# Patient Record
Sex: MALE | Race: WHITE | HISPANIC OR LATINO | URBAN - METROPOLITAN AREA
[De-identification: names, ages, dates, MRNs, and addresses within clinical notes are randomized per-mention and may not be internally consistent; named-entity substitution may affect disease eponyms.]

---

## 2017-03-21 DIAGNOSIS — I10 BENIGN ESSENTIAL HTN: ICD-10-CM

## 2017-03-21 RX ORDER — AMLODIPINE BESYLATE 10 MG/1
TABLET ORAL
Qty: 30 TABLET | Refills: 3 | Status: SHIPPED | OUTPATIENT
Start: 2017-03-21 | End: 2017-07-15 | Stop reason: SDUPTHER

## 2017-07-15 DIAGNOSIS — I10 BENIGN ESSENTIAL HTN: ICD-10-CM

## 2017-07-17 RX ORDER — AMLODIPINE BESYLATE 10 MG/1
TABLET ORAL
Qty: 30 TABLET | Refills: 0 | Status: SHIPPED | OUTPATIENT
Start: 2017-07-17 | End: 2017-08-20 | Stop reason: SDUPTHER

## 2017-08-20 DIAGNOSIS — I10 BENIGN ESSENTIAL HTN: ICD-10-CM

## 2017-08-21 RX ORDER — AMLODIPINE BESYLATE 10 MG/1
TABLET ORAL
Qty: 30 TABLET | Refills: 0 | Status: SHIPPED | OUTPATIENT
Start: 2017-08-21 | End: 2017-09-17 | Stop reason: SDUPTHER

## 2017-09-17 DIAGNOSIS — I10 BENIGN ESSENTIAL HTN: ICD-10-CM

## 2017-09-18 DIAGNOSIS — I10 BENIGN ESSENTIAL HTN: ICD-10-CM

## 2017-09-18 RX ORDER — AMLODIPINE BESYLATE 10 MG/1
TABLET ORAL
Qty: 30 TABLET | Refills: 0 | OUTPATIENT
Start: 2017-09-18

## 2017-09-18 RX ORDER — AMLODIPINE BESYLATE 10 MG/1
TABLET ORAL
Qty: 30 TABLET | Refills: 0 | Status: SHIPPED | OUTPATIENT
Start: 2017-09-18 | End: 2017-09-27 | Stop reason: SDUPTHER

## 2017-09-19 DIAGNOSIS — I10 BENIGN ESSENTIAL HTN: ICD-10-CM

## 2017-09-19 RX ORDER — ATENOLOL 50 MG/1
TABLET ORAL
Qty: 60 TABLET | Refills: 0 | OUTPATIENT
Start: 2017-09-19

## 2017-09-27 ENCOUNTER — OFFICE VISIT (OUTPATIENT)
Dept: INTERNAL MEDICINE | Facility: CLINIC | Age: 67
End: 2017-09-27

## 2017-09-27 VITALS
WEIGHT: 133 LBS | HEIGHT: 68 IN | DIASTOLIC BLOOD PRESSURE: 54 MMHG | BODY MASS INDEX: 20.16 KG/M2 | SYSTOLIC BLOOD PRESSURE: 108 MMHG

## 2017-09-27 DIAGNOSIS — E78.00 HYPERCHOLESTEROLEMIA: ICD-10-CM

## 2017-09-27 DIAGNOSIS — I10 ESSENTIAL HYPERTENSION: Primary | ICD-10-CM

## 2017-09-27 DIAGNOSIS — R97.20 ELEVATED PROSTATE SPECIFIC ANTIGEN (PSA): ICD-10-CM

## 2017-09-27 LAB
ALBUMIN SERPL-MCNC: 4.5 G/DL (ref 3.5–5.2)
ALBUMIN/GLOB SERPL: 1.4 G/DL
ALP SERPL-CCNC: 67 U/L (ref 39–117)
ALT SERPL W P-5'-P-CCNC: 14 U/L (ref 1–41)
ANION GAP SERPL CALCULATED.3IONS-SCNC: 11.9 MMOL/L
AST SERPL-CCNC: 16 U/L (ref 1–40)
BACTERIA UR QL AUTO: ABNORMAL /HPF
BASOPHILS # BLD AUTO: 0.02 10*3/MM3 (ref 0–0.2)
BASOPHILS NFR BLD AUTO: 0.2 % (ref 0–2)
BILIRUB SERPL-MCNC: 0.4 MG/DL (ref 0.1–1.2)
BILIRUB UR QL STRIP: NEGATIVE
BUN BLD-MCNC: 27 MG/DL (ref 8–23)
BUN/CREAT SERPL: 18.8 (ref 7–25)
CALCIUM SPEC-SCNC: 10.2 MG/DL (ref 8.6–10.5)
CHLORIDE SERPL-SCNC: 108 MMOL/L (ref 98–107)
CLARITY UR: CLEAR
CO2 SERPL-SCNC: 28.1 MMOL/L (ref 22–29)
COLOR UR: YELLOW
CREAT BLD-MCNC: 1.44 MG/DL (ref 0.76–1.27)
DEPRECATED RDW RBC AUTO: 44.8 FL (ref 37–54)
EOSINOPHIL # BLD AUTO: 0.11 10*3/MM3 (ref 0–0.7)
EOSINOPHIL NFR BLD AUTO: 1.2 % (ref 0–5)
ERYTHROCYTE [DISTWIDTH] IN BLOOD BY AUTOMATED COUNT: 13.4 % (ref 11.5–15)
GFR SERPL CREATININE-BSD FRML MDRD: 49 ML/MIN/1.73
GLOBULIN UR ELPH-MCNC: 3.3 GM/DL
GLUCOSE BLD-MCNC: 97 MG/DL (ref 65–99)
GLUCOSE UR STRIP-MCNC: NEGATIVE MG/DL
HCT VFR BLD AUTO: 41.1 % (ref 40.1–51)
HGB BLD-MCNC: 13.7 G/DL (ref 13.7–17.5)
HGB UR QL STRIP.AUTO: ABNORMAL
HYALINE CASTS UR QL AUTO: ABNORMAL /LPF
KETONES UR QL STRIP: NEGATIVE
LEUKOCYTE ESTERASE UR QL STRIP.AUTO: NEGATIVE
LYMPHOCYTES # BLD AUTO: 0.82 10*3/MM3 (ref 0.8–7)
LYMPHOCYTES NFR BLD AUTO: 8.7 % (ref 10–60)
MCH RBC QN AUTO: 31.1 PG (ref 26–34)
MCHC RBC AUTO-ENTMCNC: 33.3 G/DL (ref 31–37)
MCV RBC AUTO: 93.2 FL (ref 80–100)
MONOCYTES # BLD AUTO: 0.52 10*3/MM3 (ref 0–1)
MONOCYTES NFR BLD AUTO: 5.5 % (ref 0–13)
NEUTROPHILS # BLD AUTO: 7.91 10*3/MM3 (ref 1–11)
NEUTROPHILS NFR BLD AUTO: 84.4 % (ref 30–85)
NITRITE UR QL STRIP: NEGATIVE
PH UR STRIP.AUTO: 6.5 [PH] (ref 5–8)
PLATELET # BLD AUTO: 250 10*3/MM3 (ref 150–450)
PMV BLD AUTO: 10.7 FL (ref 6–12)
POTASSIUM BLD-SCNC: 5.3 MMOL/L (ref 3.5–5.2)
PROT SERPL-MCNC: 7.8 G/DL (ref 6–8.5)
PROT UR QL STRIP: NEGATIVE
PSA SERPL-MCNC: 3.84 NG/ML (ref 0–4)
RBC # BLD AUTO: 4.41 10*6/MM3 (ref 4.63–6.08)
RBC # UR: ABNORMAL /HPF
REF LAB TEST METHOD: ABNORMAL
SODIUM BLD-SCNC: 148 MMOL/L (ref 136–145)
SP GR UR STRIP: 1.01 (ref 1–1.03)
SQUAMOUS #/AREA URNS HPF: ABNORMAL /HPF
UROBILINOGEN UR QL STRIP: ABNORMAL
WBC NRBC COR # BLD: 9.38 10*3/MM3 (ref 5–10)
WBC UR QL AUTO: ABNORMAL /HPF

## 2017-09-27 PROCEDURE — 81001 URINALYSIS AUTO W/SCOPE: CPT | Performed by: INTERNAL MEDICINE

## 2017-09-27 PROCEDURE — 84153 ASSAY OF PSA TOTAL: CPT | Performed by: INTERNAL MEDICINE

## 2017-09-27 PROCEDURE — 85025 COMPLETE CBC W/AUTO DIFF WBC: CPT | Performed by: INTERNAL MEDICINE

## 2017-09-27 PROCEDURE — 80053 COMPREHEN METABOLIC PANEL: CPT | Performed by: INTERNAL MEDICINE

## 2017-09-27 PROCEDURE — 99215 OFFICE O/P EST HI 40 MIN: CPT | Performed by: INTERNAL MEDICINE

## 2017-09-28 ENCOUNTER — TELEPHONE (OUTPATIENT)
Dept: INTERNAL MEDICINE | Facility: CLINIC | Age: 67
End: 2017-09-28

## 2017-09-28 DIAGNOSIS — N28.9 DECREASED RENAL FUNCTION: Primary | ICD-10-CM

## 2017-09-28 NOTE — TELEPHONE ENCOUNTER
----- Message from Alessandro Guadalupe Jr., MD sent at 9/28/2017  8:02 AM EDT -----  PSA has returned to normal.  Renal function somewhat diminished although this may be lab area.  Please repeat BMP after having drunk 8 glasses of liquids the day before.

## 2017-10-22 DIAGNOSIS — I10 BENIGN ESSENTIAL HTN: ICD-10-CM

## 2017-10-23 RX ORDER — AMLODIPINE BESYLATE 10 MG/1
TABLET ORAL
Qty: 30 TABLET | Refills: 5 | Status: SHIPPED | OUTPATIENT
Start: 2017-10-23 | End: 2018-03-28 | Stop reason: SDUPTHER

## 2017-12-04 RX ORDER — METOPROLOL TARTRATE 50 MG/1
TABLET, FILM COATED ORAL
Qty: 60 TABLET | Refills: 3 | Status: SHIPPED | OUTPATIENT
Start: 2017-12-04 | End: 2018-03-28 | Stop reason: SDUPTHER

## 2018-03-28 ENCOUNTER — OFFICE VISIT (OUTPATIENT)
Dept: INTERNAL MEDICINE | Facility: CLINIC | Age: 68
End: 2018-03-28

## 2018-03-28 VITALS
HEART RATE: 58 BPM | DIASTOLIC BLOOD PRESSURE: 74 MMHG | WEIGHT: 140 LBS | HEIGHT: 68 IN | SYSTOLIC BLOOD PRESSURE: 110 MMHG | OXYGEN SATURATION: 98 % | BODY MASS INDEX: 21.22 KG/M2

## 2018-03-28 DIAGNOSIS — I10 ESSENTIAL HYPERTENSION: Primary | ICD-10-CM

## 2018-03-28 DIAGNOSIS — R97.20 ELEVATED PROSTATE SPECIFIC ANTIGEN (PSA): ICD-10-CM

## 2018-03-28 DIAGNOSIS — E78.00 HYPERCHOLESTEROLEMIA: ICD-10-CM

## 2018-03-28 LAB
ALBUMIN SERPL-MCNC: 4.1 G/DL (ref 3.5–5.2)
ALBUMIN/GLOB SERPL: 1.6 G/DL
ALP SERPL-CCNC: 68 U/L (ref 39–117)
ALT SERPL-CCNC: 10 U/L (ref 1–41)
AST SERPL-CCNC: 16 U/L (ref 1–40)
BILIRUB SERPL-MCNC: 0.3 MG/DL (ref 0.1–1.2)
BUN SERPL-MCNC: 26 MG/DL (ref 8–23)
BUN/CREAT SERPL: 21.5 (ref 7–25)
CALCIUM SERPL-MCNC: 9.5 MG/DL (ref 8.6–10.5)
CHLORIDE SERPL-SCNC: 104 MMOL/L (ref 98–107)
CO2 SERPL-SCNC: 27.1 MMOL/L (ref 22–29)
CREAT SERPL-MCNC: 1.21 MG/DL (ref 0.76–1.27)
GLOBULIN SER CALC-MCNC: 2.6 GM/DL
GLUCOSE SERPL-MCNC: 86 MG/DL (ref 65–99)
POTASSIUM SERPL-SCNC: 5 MMOL/L (ref 3.5–5.2)
PROT SERPL-MCNC: 6.7 G/DL (ref 6–8.5)
PSA SERPL-MCNC: 3.12 NG/ML (ref 0–4)
SODIUM SERPL-SCNC: 144 MMOL/L (ref 136–145)

## 2018-03-28 PROCEDURE — 36415 COLL VENOUS BLD VENIPUNCTURE: CPT | Performed by: INTERNAL MEDICINE

## 2018-03-28 PROCEDURE — 99213 OFFICE O/P EST LOW 20 MIN: CPT | Performed by: INTERNAL MEDICINE

## 2018-03-28 RX ORDER — AMLODIPINE BESYLATE 10 MG/1
10 TABLET ORAL DAILY
Qty: 90 TABLET | Refills: 3 | Status: SHIPPED | OUTPATIENT
Start: 2018-03-28 | End: 2019-03-12 | Stop reason: SDUPTHER

## 2018-03-28 RX ORDER — METOPROLOL TARTRATE 50 MG/1
50 TABLET, FILM COATED ORAL 2 TIMES DAILY
Qty: 180 TABLET | Refills: 3 | Status: SHIPPED | OUTPATIENT
Start: 2018-03-28 | End: 2019-03-12 | Stop reason: SDUPTHER

## 2018-03-28 NOTE — PROGRESS NOTES
Subjective   Jose Angel Us is a 67 y.o. male.     History of Present Illness he is here today for follow-up of hypertension, hypercholesterolemia, and BPH with elevated PSA.  In general he has felt well.  He denies any dysuria, hematuria, incomplete voiding, or urinary hesitancy.  He does not have any nocturia.  He denies any PND, MCCURDY, chest pain, or swelling in the ankles.  He has not had any dizziness, syncope, or focal neurologic symptoms.        Review of Systems   Constitutional: Negative for activity change, appetite change and fatigue.   HENT: Negative for trouble swallowing.    Respiratory: Negative for cough, chest tightness, shortness of breath and wheezing.    Cardiovascular: Negative for chest pain, palpitations and leg swelling.   Gastrointestinal: Negative for abdominal pain.   Genitourinary: Negative for difficulty urinating, flank pain, frequency, hematuria and nocturia.   Neurological: Negative for dizziness, speech difficulty and numbness.   Psychiatric/Behavioral: Negative for depressed mood. The patient is not nervous/anxious.        Objective   Physical Exam   Constitutional: He is oriented to person, place, and time. Vital signs are normal. He appears well-developed and well-nourished. He is active. He does not appear ill.   Eyes: Conjunctivae are normal.   Neck: Carotid bruit is not present.   Cardiovascular: Normal rate, regular rhythm, S1 normal and S2 normal.  Exam reveals no S3 and no S4.    No murmur heard.  Pulses:       Posterior tibial pulses are 2+ on the right side, and 2+ on the left side.   Pulmonary/Chest: No tachypnea. No respiratory distress. He has no decreased breath sounds. He has no wheezes. He has no rhonchi. He has no rales.   Abdominal: Soft. Normal appearance and bowel sounds are normal. He exhibits no abdominal bruit and no mass. There is no hepatosplenomegaly. There is no tenderness.     Vascular Status -  His right foot exhibits no edema. His left foot exhibits no  edema.  Neurological: He is alert and oriented to person, place, and time. Gait normal.   Psychiatric: He has a normal mood and affect. His speech is normal and behavior is normal. Judgment and thought content normal. Cognition and memory are normal.         Assessment/Plan September lab showed a normal CBC.  PSA was 3.8.  Urinalysis showed 6-12 red cells.  BUN 27 creatinine 1.44    Assessment #1 hypertension-good control #2 hypercholesterolemia-no sign of target organ injury #3 BPH-likely cause of mildly transiently elevated PSA #4 question renal insufficiency.  All of this was discussed with the patient.    Plan #1 no change medication #2 CMP and PSA today.  Routine follow-up with me in one year.

## 2018-05-09 ENCOUNTER — OFFICE VISIT (OUTPATIENT)
Dept: INTERNAL MEDICINE | Facility: CLINIC | Age: 68
End: 2018-05-09

## 2018-05-09 VITALS
WEIGHT: 135 LBS | SYSTOLIC BLOOD PRESSURE: 126 MMHG | HEART RATE: 56 BPM | HEIGHT: 68 IN | BODY MASS INDEX: 20.46 KG/M2 | DIASTOLIC BLOOD PRESSURE: 70 MMHG | OXYGEN SATURATION: 96 %

## 2018-05-09 DIAGNOSIS — N40.1 BENIGN PROSTATIC HYPERPLASIA WITH NOCTURIA: ICD-10-CM

## 2018-05-09 DIAGNOSIS — N39.0 URINARY TRACT INFECTION WITHOUT HEMATURIA, SITE UNSPECIFIED: Primary | ICD-10-CM

## 2018-05-09 DIAGNOSIS — R35.1 BENIGN PROSTATIC HYPERPLASIA WITH NOCTURIA: ICD-10-CM

## 2018-05-09 LAB
BACTERIA UR QL AUTO: ABNORMAL /HPF
BILIRUB UR QL STRIP: NEGATIVE
BUN SERPL-MCNC: 24 MG/DL (ref 8–23)
BUN/CREAT SERPL: 18.5 (ref 7–25)
CALCIUM SERPL-MCNC: 9.7 MG/DL (ref 8.6–10.5)
CHLORIDE SERPL-SCNC: 106 MMOL/L (ref 98–107)
CLARITY UR: CLEAR
CO2 SERPL-SCNC: 25.3 MMOL/L (ref 22–29)
COLOR UR: YELLOW
CREAT SERPL-MCNC: 1.3 MG/DL (ref 0.76–1.27)
GLUCOSE SERPL-MCNC: 102 MG/DL (ref 65–99)
GLUCOSE UR STRIP-MCNC: NEGATIVE MG/DL
HGB UR QL STRIP.AUTO: ABNORMAL
HYALINE CASTS UR QL AUTO: ABNORMAL /LPF
KETONES UR QL STRIP: NEGATIVE
LEUKOCYTE ESTERASE UR QL STRIP.AUTO: NEGATIVE
MUCOUS THREADS URNS QL MICRO: ABNORMAL /HPF
NITRITE UR QL STRIP: NEGATIVE
PH UR STRIP.AUTO: 7.5 [PH] (ref 5–8)
POTASSIUM SERPL-SCNC: 4.9 MMOL/L (ref 3.5–5.2)
PROT UR QL STRIP: NEGATIVE
RBC # UR: ABNORMAL /HPF
REF LAB TEST METHOD: ABNORMAL
SODIUM SERPL-SCNC: 143 MMOL/L (ref 136–145)
SP GR UR STRIP: 1.02 (ref 1–1.03)
SQUAMOUS #/AREA URNS HPF: ABNORMAL /HPF
UROBILINOGEN UR QL STRIP: ABNORMAL
WBC UR QL AUTO: ABNORMAL /HPF

## 2018-05-09 PROCEDURE — 99213 OFFICE O/P EST LOW 20 MIN: CPT | Performed by: INTERNAL MEDICINE

## 2018-05-09 PROCEDURE — 36415 COLL VENOUS BLD VENIPUNCTURE: CPT | Performed by: INTERNAL MEDICINE

## 2018-05-09 PROCEDURE — 81001 URINALYSIS AUTO W/SCOPE: CPT | Performed by: INTERNAL MEDICINE

## 2018-05-09 RX ORDER — CEPHALEXIN 250 MG/1
CAPSULE ORAL
Qty: 56 CAPSULE | Refills: 0 | Status: SHIPPED | OUTPATIENT
Start: 2018-05-09 | End: 2019-03-12

## 2018-05-09 RX ORDER — CIPROFLOXACIN 500 MG/1
TABLET, FILM COATED ORAL
Qty: 28 TABLET | Refills: 0 | Status: SHIPPED | OUTPATIENT
Start: 2018-05-09 | End: 2018-05-09

## 2018-05-09 NOTE — PROGRESS NOTES
Subjective   Jose Angel Us is a 67 y.o. male.     History of Present Illness he is here today with a two-week history of suprapubic inguinal area pressure primarily at night when he lies down in bed as well as 2 per night nocturia.  He is concerned that he has prostate cancer.  He denies any dysuria, hematuria, urinary hesitancy, sensation of incomplete voiding, or urgency.        Review of Systems   Constitutional: Negative for activity change, appetite change, chills, diaphoresis, fatigue, fever and unexpected weight loss.   Gastrointestinal: Negative for abdominal pain, constipation, diarrhea, nausea and vomiting.   Genitourinary: Positive for nocturia. Negative for dysuria, flank pain, frequency, hematuria and urgency.   Musculoskeletal: Negative for back pain.       Objective   Physical Exam   Constitutional: He is oriented to person, place, and time. Vital signs are normal. He appears well-developed and well-nourished. He is active. He does not appear ill.   Cardiovascular: Normal rate, regular rhythm, S1 normal and S2 normal.  Exam reveals no S3 and no S4.    No murmur heard.  Pulmonary/Chest: No tachypnea. No respiratory distress. He has no decreased breath sounds. He has no wheezes. He has no rhonchi. He has no rales.   Abdominal: Soft. Bowel sounds are normal. He exhibits no abdominal bruit and no mass. There is no hepatosplenomegaly. There is no tenderness. Hernia confirmed negative in the right inguinal area and confirmed negative in the left inguinal area.   Genitourinary: Testes normal and penis normal.       Circumcised.     Vascular Status -  His right foot exhibits no edema. His left foot exhibits no edema.  Lymphadenopathy: No inguinal adenopathy noted on the right or left side.   Neurological: He is alert and oriented to person, place, and time. Gait normal.   Psychiatric: He has a normal mood and affect. His speech is normal and behavior is normal. Judgment and thought content normal. Cognition and  memory are normal.         Assessment/Plan urinalysis shows 3-5 red cells.  PSA is normal.  In March BUN 26 creatinine 1.21    Assessment #1 BPH-question low-grade prostatitis.  This was discussed with him.    Plan #1 Cipro 500 mg twice a day for 14 days #2 BMP today.  If symptoms do not clear after 2 weeks of Cipro he will be referred to his urologist.

## 2018-05-14 ENCOUNTER — TELEPHONE (OUTPATIENT)
Dept: INTERNAL MEDICINE | Facility: CLINIC | Age: 68
End: 2018-05-14

## 2018-05-14 DIAGNOSIS — N28.9 IMPAIRED RENAL FUNCTION: Primary | ICD-10-CM

## 2018-05-14 NOTE — TELEPHONE ENCOUNTER
----- Message from Alessandro Guadalupe Jr., MD sent at 5/10/2018  7:50 AM EDT -----  Renal function is mildly diminished.  We need renal ultrasound and consult with urology to rule out obstructive uropathy.

## 2018-05-15 ENCOUNTER — TELEPHONE (OUTPATIENT)
Dept: INTERNAL MEDICINE | Facility: CLINIC | Age: 68
End: 2018-05-15

## 2018-05-15 NOTE — TELEPHONE ENCOUNTER
----- Message from Gabrielle Clement sent at 5/15/2018 11:19 AM EDT -----  Pt picked up the cephalexin (KEFLEX) 250 MG capsule medication this morning and took the first pill at 10am. He is now experiencing sinus pressure that he thinks is related to the med. He is asking for a callback at [phone]. Thank you.

## 2019-03-12 ENCOUNTER — OFFICE VISIT (OUTPATIENT)
Dept: INTERNAL MEDICINE | Age: 69
End: 2019-03-12

## 2019-03-12 VITALS
TEMPERATURE: 99 F | BODY MASS INDEX: 21.07 KG/M2 | RESPIRATION RATE: 16 BRPM | OXYGEN SATURATION: 96 % | HEIGHT: 68 IN | WEIGHT: 139 LBS | HEART RATE: 93 BPM | SYSTOLIC BLOOD PRESSURE: 120 MMHG | DIASTOLIC BLOOD PRESSURE: 62 MMHG

## 2019-03-12 DIAGNOSIS — Z76.89 ENCOUNTER TO ESTABLISH CARE: Primary | ICD-10-CM

## 2019-03-12 DIAGNOSIS — I10 ESSENTIAL HYPERTENSION: ICD-10-CM

## 2019-03-12 PROCEDURE — 99204 OFFICE O/P NEW MOD 45 MIN: CPT | Performed by: NURSE PRACTITIONER

## 2019-03-12 RX ORDER — AMLODIPINE BESYLATE 10 MG/1
10 TABLET ORAL DAILY
Qty: 90 TABLET | Refills: 3 | Status: SHIPPED | OUTPATIENT
Start: 2019-03-12 | End: 2019-12-18 | Stop reason: SDUPTHER

## 2019-03-12 RX ORDER — METOPROLOL TARTRATE 50 MG/1
50 TABLET, FILM COATED ORAL 2 TIMES DAILY
Qty: 180 TABLET | Refills: 3 | Status: SHIPPED | OUTPATIENT
Start: 2019-03-12 | End: 2019-12-18 | Stop reason: SDUPTHER

## 2019-03-12 NOTE — PROGRESS NOTES
Jose Angel Magen / 68 y.o. / male  Encounter Date: 03/12/2019    ASSESSMENT & PLAN:    Problem List Items Addressed This Visit        Cardiovascular and Mediastinum    Hypertension    Relevant Medications    amLODIPine (NORVASC) 10 MG tablet    metoprolol tartrate (LOPRESSOR) 50 MG tablet      Other Visit Diagnoses     Encounter to establish care    -  Primary        No orders of the defined types were placed in this encounter.    New Medications Ordered This Visit   Medications   • amLODIPine (NORVASC) 10 MG tablet     Sig: Take 1 tablet by mouth Daily.     Dispense:  90 tablet     Refill:  3   • metoprolol tartrate (LOPRESSOR) 50 MG tablet     Sig: Take 1 tablet by mouth 2 (Two) Times a Day.     Dispense:  180 tablet     Refill:  3       Summary/Discussion:  1.  Patient is due for Medicare annual wellness visit.  Given packet to complete and return before next visit. Pt verbalized understanding.   2.  He is not fasting today. Due for annual physical with fasting labs, CBC, CMP, PSA, lipid panel, A1c.  History of elevated PSA that trended down. Denies urinary symptoms today.  3.  Pt clothing self-care/appearance and wandering conversation tendency require follow-up. Compare to presentation at follow up visit since this is my first time meeting him. Consider dementia screening at next visit.  Family history of mother with dementia. Assess depression screening also.   4. Refused colonoscopy and all vaccinations (Tdap, Zoster, Pneumococcal, flu). Educated pt about risk of colon cancer, option for Cologuard, and risk of illness without vaccinations.   5. Recommended stopping ASA based on recent ASA trials for primary prevention (ASPREE, ASCEND, ARRIVE).  6. HTN stable, continue to monitor. Maintain low sodium diet, increase aerobic exercise. Pt would like to stop HTN medication if future if possible.  Advised patient of risk for sustained elevated blood pressure, genetic predisposition, and lifestyle modifications to  "consider decreasing medication.    Return in about 3 months (around 6/12/2019) for Medicare Wellness with fasting labs 1 week prior.  ________________________________________________________________    VITALS:    Visit Vitals  /62   Pulse 93   Temp 99 °F (37.2 °C)   Resp 16   Ht 172.7 cm (68\")   Wt 63 kg (139 lb)   SpO2 96%   BMI 21.13 kg/m²       BP Readings from Last 3 Encounters:   03/12/19 120/62   05/09/18 126/70   03/28/18 110/74     Wt Readings from Last 3 Encounters:   03/12/19 63 kg (139 lb)   05/09/18 61.2 kg (135 lb)   03/28/18 63.5 kg (140 lb)      Body mass index is 21.13 kg/m².    CC: Main reason(s) for today's visit: Establish Care (new patient- needs refills ) and Sinus Problem (pt also complaining of sinus congestion )    Patient is a 68 y.o. male who is here to establish care.   Reports he has had chronic sinus congestion, pointing to his forehead, for greater than 30 years.  Denies coughing, fevers, chills, ear pain, headaches, SOA, chest pain.  Patient has difficulty maintaining topic of conversation. Family history significant for mother with dementia. He has duct tape maintaining his eye-glasses and belt, as well as multiple tatters on his jacket sleeves. He is physically clean. Reports he does not go to the dentist or the eye doctor because of cost although he needs new prescription glasses and his current glasses are visibly dirty/cracked. Denies vision changes/difficulty.     Patient Care Team:  Amber Guerra APRN as PCP - General (Nurse Practitioner)  ____________________________________________________________________    REVIEW OF SYSTEMS    Review of Systems   Constitutional: Negative.    HENT: Positive for congestion (reports sinus pressure >30 years). Negative for ear pain.    Respiratory: Negative for cough and shortness of breath.    Cardiovascular: Negative.    Gastrointestinal: Negative.    Genitourinary: Positive for frequency (minimal). Negative for difficulty urinating, " enuresis and urgency.   Musculoskeletal: Negative.    Allergic/Immunologic: Positive for environmental allergies.   Neurological: Negative for light-headedness and headaches.   Psychiatric/Behavioral: Positive for decreased concentration.     PHYSICAL EXAMINATION    Physical Exam   Constitutional: He is oriented to person, place, and time. He appears well-developed.   HENT:   Head: Normocephalic.   Right Ear: External ear normal.   Left Ear: External ear normal.   Mouth/Throat: Oropharynx is clear and moist.   Eyes: Pupils are equal, round, and reactive to light.   Neck: Normal range of motion. Neck supple.   Cardiovascular: Normal rate, regular rhythm and normal heart sounds.   Pulmonary/Chest: Effort normal and breath sounds normal.   Musculoskeletal: Normal range of motion.   Lymphadenopathy:     He has no cervical adenopathy.   Neurological: He is alert and oriented to person, place, and time.   Psychiatric: He has a normal mood and affect.   Wandering conversation topics, duct tape supporting belt and glasses, jacket with large tatters on both sleeves     REVIEWED DATA:    Labs:   Lab Results   Component Value Date     05/09/2018    K 4.9 05/09/2018    AST 16 03/28/2018    ALT 10 03/28/2018    BUN 24 (H) 05/09/2018    CREATININE 1.30 (H) 05/09/2018    CREATININE 1.21 03/28/2018    CREATININE 1.44 (H) 09/27/2017    EGFRIFNONA 55 (L) 05/09/2018    EGFRIFAFRI 66 05/09/2018       Lab Results   Component Value Date    GLUCOSE 97 09/27/2017    GLUCOSE 88 10/11/2016       No results found for: LDL, HDL, TRIG, CHOLHDLRATIO    Lab Results   Component Value Date    TSH 1.70 08/28/2015          Lab Results   Component Value Date    WBC 9.38 09/27/2017    HGB 13.7 09/27/2017    HGB 13.7 10/11/2016    HGB 13.9 08/28/2015     09/27/2017         Imaging:      Medical Tests:      Summary of old records / correspondence / consultant report:      Request outside records:    ______________________________________________________________________    ALLERGIES  Allergies   Allergen Reactions   • Ciprofloxacin Unknown (See Comments)     Patient is unsure what reaction he had, severe sinus pressure   • Grass    • Pollen Extract    • Sulfa Antibiotics Rash        MEDICATIONS  Current Outpatient Medications on File Prior to Visit   Medication Sig   • [DISCONTINUED] amLODIPine (NORVASC) 10 MG tablet Take 1 tablet by mouth Daily.   • [DISCONTINUED] aspirin 81 MG tablet Take 81 mg by mouth Daily.   • [DISCONTINUED] metoprolol tartrate (LOPRESSOR) 50 MG tablet Take 1 tablet by mouth 2 (Two) Times a Day.   • [DISCONTINUED] cephalexin (KEFLEX) 250 MG capsule Take one tablet by mouth four times a day for 14 days.     No current facility-administered medications on file prior to visit.        PFSH:     The following portions of the patient's history were reviewed and updated as appropriate: Allergies / Current Medications / Past Medical History / Surgical History / Social History / Family History    PROBLEM LIST   Patient Active Problem List   Diagnosis   • Hypertension   • Benign prostatic hyperplasia   • Chronic sinusitis   • Hypercholesterolemia   • Raynaud's syndrome without gangrene       PAST MEDICAL HISTORY  Past Medical History:   Diagnosis Date   • Anemia    • Hypertension        SURGICAL HISTORY  History reviewed. No pertinent surgical history.    SOCIAL HISTORY  Social History     Socioeconomic History   • Marital status: Unknown     Spouse name: Not on file   • Number of children: Not on file   • Years of education: Not on file   • Highest education level: Not on file   Tobacco Use   • Smoking status: Never Smoker   • Smokeless tobacco: Never Used   Substance and Sexual Activity   • Alcohol use: No     Comment: quit a couple years ago   • Drug use: No   • Sexual activity: No       FAMILY HISTORY  Family History   Problem Relation Age of Onset   • Dementia Mother    • Angina Father    •  Heart disease Father    • Heart attack Father    • Diabetes Maternal Uncle    • Prostate cancer Neg Hx    • Colon cancer Neg Hx

## 2019-12-17 NOTE — PROGRESS NOTES
Jose Angel Barrera / 69 y.o. / male  Encounter Date: 12/18/2019    ASSESSMENT & PLAN:    Problem List Items Addressed This Visit        Cardiovascular and Mediastinum    Hypertension    Relevant Medications    amLODIPine (NORVASC) 10 MG tablet    metoprolol tartrate (LOPRESSOR) 50 MG tablet      Other Visit Diagnoses     Positive occult stool blood test    -  Primary    Relevant Orders    CBC & Differential (Completed)    Ambulatory Referral For Screening Colonoscopy    Constipation, unspecified constipation type        Relevant Orders    Ambulatory Referral For Screening Colonoscopy        Orders Placed This Encounter   Procedures   • Ambulatory Referral For Screening Colonoscopy   • CBC & Differential     New Medications Ordered This Visit   Medications   • amLODIPine (NORVASC) 10 MG tablet     Sig: Take 1 tablet by mouth Daily.     Dispense:  90 tablet     Refill:  3   • metoprolol tartrate (LOPRESSOR) 50 MG tablet     Sig: Take 1 tablet by mouth 2 (Two) Times a Day.     Dispense:  180 tablet     Refill:  3       Summary/Discussion:  1. Essential hypertension  - Continue BP medications as prescribed and follow up in 3 months for recheck.   - amLODIPine (NORVASC) 10 MG tablet; Take 1 tablet by mouth Daily.  Dispense: 90 tablet; Refill: 3  - metoprolol tartrate (LOPRESSOR) 50 MG tablet; Take 1 tablet by mouth 2 (Two) Times a Day.  Dispense: 180 tablet; Refill: 3    2. Positive occult stool blood test  - Avoid all NSAIDS. Advised patient to monitor stool for abnormalities in consistency or bleeding. Recommend screening colonoscopy. Patient has never had colonoscopy. No family history of colon cancer or complications.   - CBC & Differential  - Ambulatory Referral For Screening Colonoscopy    3. Constipation, unspecified constipation type  - Ambulatory Referral For Screening Colonoscopy        Return in about 3 months (around 3/18/2020) for Check Up on chronic  "issues.  ________________________________________________________________    VITALS:    Visit Vitals  /70   Pulse 62   Temp 99.1 °F (37.3 °C) (Temporal)   Ht 172.7 cm (67.99\")   Wt 60.8 kg (134 lb)   SpO2 98%   BMI 20.38 kg/m²       BP Readings from Last 3 Encounters:   12/18/19 130/70   03/12/19 120/62   05/09/18 126/70     Wt Readings from Last 3 Encounters:   12/18/19 60.8 kg (134 lb)   03/12/19 63 kg (139 lb)   05/09/18 61.2 kg (135 lb)      Body mass index is 20.38 kg/m².    CC: Main reason(s) for today's visit: Black or Bloody Stool      HPI    Patient is a 69 y.o. male who is here for follow up on positive fecal occult blood sample completed from mail-in lab through his insurance carrier. He denies active or gross blood in stool, or hematuria, abdominal pain or change in bowel habits. He reports that he frequently uses laxatives and enemas, stating that he is \"addicted to them\", but when questioned he denies constipation issues as cause for use. He was unable to verbalize reason for using enemas. He appears well, but anxious. His VS are stable. No abdominal pain or tenderness on exam. No abnormal bruising, weight loss, fever/chills, night sweats. NO family history of colon cancer or colon disorders. Never smoker, denies alcohol use.     Patient Care Team:  Amber Guerra APRN as PCP - General (Nurse Practitioner)  ____________________________________________________________________    REVIEW OF SYSTEMS    Review of Systems  As noted per HPI  Constitutional neg  Resp neg  CV neg     PHYSICAL EXAMINATION    Physical Exam  Constitutional  No distress  Cardiovascular Rate  normal . Rhythm: regular . Heart sounds:  normal  Pulmonary/Chest  Effort normal. Breath sounds:  normal  Psychiatric  Alert. Judgment and thought content normal. Mood normal      REVIEWED DATA:    Labs:   Lab Results   Component Value Date     05/09/2018    K 4.9 05/09/2018    AST 16 03/28/2018    ALT 10 03/28/2018    BUN 24 (H) " 05/09/2018    CREATININE 1.30 (H) 05/09/2018    CREATININE 1.21 03/28/2018    CREATININE 1.44 (H) 09/27/2017    EGFRIFNONA 55 (L) 05/09/2018    EGFRIFAFRI 66 05/09/2018       Lab Results   Component Value Date    GLUCOSE 97 09/27/2017    GLUCOSE 88 10/11/2016       No results found for: LDL, HDL, TRIG, CHOLHDLRATIO    Lab Results   Component Value Date    TSH 1.70 08/28/2015          Lab Results   Component Value Date    WBC 11.01 (H) 12/18/2019    HGB 14.1 12/18/2019    HGB 13.7 09/27/2017    HGB 13.7 10/11/2016     12/18/2019         Imaging:      Medical Tests:      Summary of old records / correspondence / consultant report:      Request outside records:   ______________________________________________________________________    ALLERGIES  Allergies   Allergen Reactions   • Ciprofloxacin Unknown (See Comments)     Patient is unsure what reaction he had, severe sinus pressure   • Grass    • Pollen Extract    • Sulfa Antibiotics Rash        MEDICATIONS  No current outpatient medications on file prior to visit.     No current facility-administered medications on file prior to visit.        PFSH:     The following portions of the patient's history were reviewed and updated as appropriate: Allergies / Current Medications / Past Medical History / Surgical History / Social History / Family History    PROBLEM LIST   Patient Active Problem List   Diagnosis   • Hypertension   • Benign prostatic hyperplasia   • Chronic sinusitis   • Hypercholesterolemia   • Raynaud's syndrome without gangrene       PAST MEDICAL HISTORY  Past Medical History:   Diagnosis Date   • Anemia    • Hypertension        SURGICAL HISTORY  No past surgical history on file.    SOCIAL HISTORY  Social History     Socioeconomic History   • Marital status: Unknown     Spouse name: Not on file   • Number of children: Not on file   • Years of education: Not on file   • Highest education level: Not on file   Tobacco Use   • Smoking status: Never Smoker    • Smokeless tobacco: Never Used   Substance and Sexual Activity   • Alcohol use: No     Comment: quit a couple years ago   • Drug use: No   • Sexual activity: Never       FAMILY HISTORY  Family History   Problem Relation Age of Onset   • Dementia Mother    • Angina Father    • Heart disease Father    • Heart attack Father    • Diabetes Maternal Uncle    • Prostate cancer Neg Hx    • Colon cancer Neg Hx

## 2019-12-18 ENCOUNTER — OFFICE VISIT (OUTPATIENT)
Dept: INTERNAL MEDICINE | Age: 69
End: 2019-12-18

## 2019-12-18 VITALS
TEMPERATURE: 99.1 F | OXYGEN SATURATION: 98 % | DIASTOLIC BLOOD PRESSURE: 70 MMHG | BODY MASS INDEX: 20.31 KG/M2 | WEIGHT: 134 LBS | HEIGHT: 68 IN | HEART RATE: 62 BPM | SYSTOLIC BLOOD PRESSURE: 130 MMHG

## 2019-12-18 DIAGNOSIS — I10 ESSENTIAL HYPERTENSION: ICD-10-CM

## 2019-12-18 DIAGNOSIS — K59.00 CONSTIPATION, UNSPECIFIED CONSTIPATION TYPE: ICD-10-CM

## 2019-12-18 DIAGNOSIS — R19.5 POSITIVE OCCULT STOOL BLOOD TEST: Primary | ICD-10-CM

## 2019-12-18 LAB
BASOPHILS # BLD AUTO: 0.07 10*3/MM3 (ref 0–0.2)
BASOPHILS NFR BLD AUTO: 0.6 % (ref 0–1.5)
EOSINOPHIL # BLD AUTO: 0.08 10*3/MM3 (ref 0–0.4)
EOSINOPHIL NFR BLD AUTO: 0.7 % (ref 0.3–6.2)
ERYTHROCYTE [DISTWIDTH] IN BLOOD BY AUTOMATED COUNT: 12.3 % (ref 12.3–15.4)
HCT VFR BLD AUTO: 42.4 % (ref 37.5–51)
HGB BLD-MCNC: 14.1 G/DL (ref 13–17.7)
IMM GRANULOCYTES # BLD AUTO: 0.03 10*3/MM3 (ref 0–0.05)
IMM GRANULOCYTES NFR BLD AUTO: 0.3 % (ref 0–0.5)
LYMPHOCYTES # BLD AUTO: 0.98 10*3/MM3 (ref 0.7–3.1)
LYMPHOCYTES NFR BLD AUTO: 8.9 % (ref 19.6–45.3)
MCH RBC QN AUTO: 30.9 PG (ref 26.6–33)
MCHC RBC AUTO-ENTMCNC: 33.3 G/DL (ref 31.5–35.7)
MCV RBC AUTO: 93 FL (ref 79–97)
MONOCYTES # BLD AUTO: 0.71 10*3/MM3 (ref 0.1–0.9)
MONOCYTES NFR BLD AUTO: 6.4 % (ref 5–12)
NEUTROPHILS # BLD AUTO: 9.14 10*3/MM3 (ref 1.7–7)
NEUTROPHILS NFR BLD AUTO: 83.1 % (ref 42.7–76)
NRBC BLD AUTO-RTO: 0 /100 WBC (ref 0–0.2)
PLATELET # BLD AUTO: 284 10*3/MM3 (ref 140–450)
RBC # BLD AUTO: 4.56 10*6/MM3 (ref 4.14–5.8)
WBC # BLD AUTO: 11.01 10*3/MM3 (ref 3.4–10.8)

## 2019-12-18 PROCEDURE — 99214 OFFICE O/P EST MOD 30 MIN: CPT | Performed by: NURSE PRACTITIONER

## 2019-12-18 RX ORDER — METOPROLOL TARTRATE 50 MG/1
50 TABLET, FILM COATED ORAL 2 TIMES DAILY
Qty: 180 TABLET | Refills: 3 | Status: SHIPPED | OUTPATIENT
Start: 2019-12-18 | End: 2021-03-05 | Stop reason: SDUPTHER

## 2019-12-18 RX ORDER — AMLODIPINE BESYLATE 10 MG/1
10 TABLET ORAL DAILY
Qty: 90 TABLET | Refills: 3 | Status: SHIPPED | OUTPATIENT
Start: 2019-12-18 | End: 2021-02-12 | Stop reason: SDUPTHER

## 2019-12-30 ENCOUNTER — TELEPHONE (OUTPATIENT)
Dept: INTERNAL MEDICINE | Age: 69
End: 2019-12-30

## 2019-12-30 NOTE — TELEPHONE ENCOUNTER
Can you PLEASE review the patients labs and result anything that can be resulted. Amber did not comment on patient's results.     Thank you very much.

## 2019-12-30 NOTE — TELEPHONE ENCOUNTER
Reviewed CBC. No evidence of significant or heavy bleeding. Patient still needs to follow up with gastro for C-scope as Amber recommended r/t positive occult blood stool test.

## 2019-12-31 NOTE — TELEPHONE ENCOUNTER
S/W patient and gave them their lab results/or/imaging results. PT VERBALIZED AN UNDERSTANDING.

## 2021-02-12 DIAGNOSIS — I10 ESSENTIAL HYPERTENSION: ICD-10-CM

## 2021-02-12 RX ORDER — AMLODIPINE BESYLATE 10 MG/1
10 TABLET ORAL DAILY
Qty: 30 TABLET | Refills: 0 | Status: SHIPPED | OUTPATIENT
Start: 2021-02-12 | End: 2021-03-15

## 2021-02-12 RX ORDER — AMLODIPINE BESYLATE 10 MG/1
10 TABLET ORAL DAILY
Qty: 30 TABLET | Refills: 0 | OUTPATIENT
Start: 2021-02-12

## 2021-02-12 NOTE — TELEPHONE ENCOUNTER
Caller: Jose Angel Us    Relationship: Self    Best call back number: [phone]    Medication needed:   Requested Prescriptions     Pending Prescriptions Disp Refills   • amLODIPine (NORVASC) 10 MG tablet 90 tablet 3     Sig: Take 1 tablet by mouth Daily.       When do you need the refill by: 2/12/21    What details did the patient provide when requesting the medication: PATIENT IS A FORMER [facility] PT. PATIENT HAS SCHEDULED A NEW PATIENT APPT WITH DAVID FRYE, FOR 3/2/21. THE PATIENT IS REQUESTING ENOUGH MEDICATION SENT TO THE PHARMACY TO HOLD HIM OVER UNTIL HIS APPT WITH DAVID GRAMAJO. THE PATIENT RECEIVED AN EMERGENCY SUPPLY (3 DAYS) FROM THE PHARMACY ON 2/10.    Does the patient have less than a 3 day supply:  [x] Yes  [] No    What is the patient's preferred pharmacy:    [id number] [place], [place] - [address] AT [facility] [phone] [facility] [phone]

## 2021-03-05 DIAGNOSIS — I10 ESSENTIAL HYPERTENSION: ICD-10-CM

## 2021-03-05 RX ORDER — METOPROLOL TARTRATE 50 MG/1
50 TABLET, FILM COATED ORAL 2 TIMES DAILY
Qty: 60 TABLET | Refills: 0 | Status: SHIPPED | OUTPATIENT
Start: 2021-03-05 | End: 2021-04-15 | Stop reason: SDUPTHER

## 2021-03-13 DIAGNOSIS — I10 ESSENTIAL HYPERTENSION: ICD-10-CM

## 2021-03-15 RX ORDER — AMLODIPINE BESYLATE 10 MG/1
10 TABLET ORAL DAILY
Qty: 18 TABLET | Refills: 0 | Status: SHIPPED | OUTPATIENT
Start: 2021-03-15 | End: 2021-03-26 | Stop reason: SDUPTHER

## 2021-03-26 ENCOUNTER — TELEPHONE (OUTPATIENT)
Dept: INTERNAL MEDICINE | Age: 71
End: 2021-03-26

## 2021-03-26 DIAGNOSIS — I10 ESSENTIAL HYPERTENSION: ICD-10-CM

## 2021-03-26 RX ORDER — AMLODIPINE BESYLATE 10 MG/1
10 TABLET ORAL DAILY
Qty: 7 TABLET | Refills: 0 | Status: SHIPPED | OUTPATIENT
Start: 2021-03-26 | End: 2021-04-05

## 2021-03-26 NOTE — TELEPHONE ENCOUNTER
Caller: Jose Angel Us    Relationship: Self    Best call back number: [phone]    Medication needed:   Requested Prescriptions     Pending Prescriptions Disp Refills   • amLODIPine (NORVASC) 10 MG tablet 18 tablet 0     Sig: Take 1 tablet by mouth Daily.       When do you need the refill by: ASAP    What additional details did the patient provide when requesting the medication: PATIENT HASN'T BEEN SEEN YET BUT IS RUNNING OUT AND NEEDS IT REFILLED.     Does the patient have less than a 3 day supply:  [] Yes  [x] No    What is the patient's preferred pharmacy: [id number] [place], [place] - [address] AT [facility] [phone] [facility] [phone]

## 2021-04-03 DIAGNOSIS — I10 ESSENTIAL HYPERTENSION: ICD-10-CM

## 2021-04-05 RX ORDER — AMLODIPINE BESYLATE 10 MG/1
10 TABLET ORAL DAILY
Qty: 7 TABLET | Refills: 0 | Status: SHIPPED | OUTPATIENT
Start: 2021-04-05 | End: 2021-04-15 | Stop reason: SDUPTHER

## 2021-04-15 ENCOUNTER — OFFICE VISIT (OUTPATIENT)
Dept: INTERNAL MEDICINE | Age: 71
End: 2021-04-15

## 2021-04-15 VITALS
OXYGEN SATURATION: 99 % | HEIGHT: 70 IN | WEIGHT: 127.2 LBS | SYSTOLIC BLOOD PRESSURE: 122 MMHG | DIASTOLIC BLOOD PRESSURE: 56 MMHG | HEART RATE: 62 BPM | BODY MASS INDEX: 18.21 KG/M2 | TEMPERATURE: 99.1 F

## 2021-04-15 DIAGNOSIS — I10 ESSENTIAL HYPERTENSION: ICD-10-CM

## 2021-04-15 DIAGNOSIS — Z76.89 ENCOUNTER TO ESTABLISH CARE WITH NEW DOCTOR: Primary | ICD-10-CM

## 2021-04-15 DIAGNOSIS — Z11.59 NEED FOR HEPATITIS C SCREENING TEST: ICD-10-CM

## 2021-04-15 DIAGNOSIS — Z12.5 PROSTATE CANCER SCREENING: ICD-10-CM

## 2021-04-15 PROCEDURE — 99213 OFFICE O/P EST LOW 20 MIN: CPT | Performed by: NURSE PRACTITIONER

## 2021-04-15 RX ORDER — AMLODIPINE BESYLATE 10 MG/1
10 TABLET ORAL DAILY
Qty: 30 TABLET | Refills: 5 | Status: SHIPPED | OUTPATIENT
Start: 2021-04-15 | End: 2021-09-07 | Stop reason: SDUPTHER

## 2021-04-15 RX ORDER — METOPROLOL TARTRATE 50 MG/1
50 TABLET, FILM COATED ORAL 2 TIMES DAILY
Qty: 30 TABLET | Refills: 5 | Status: SHIPPED | OUTPATIENT
Start: 2021-04-15 | End: 2021-11-15 | Stop reason: SDUPTHER

## 2021-04-15 RX ORDER — CETIRIZINE HYDROCHLORIDE 10 MG/1
10 TABLET ORAL DAILY
Qty: 30 TABLET | Refills: 5 | Status: ON HOLD | OUTPATIENT
Start: 2021-04-15 | End: 2022-02-07

## 2021-04-15 NOTE — PROGRESS NOTES
"    I N T E R N A L  M E D I C I N E  DAVID GRAMAJO, APRN      ENCOUNTER DATE:  04/15/2021    Jose Angel Magen / 70 y.o. / male      CHIEF COMPLAINT / REASON FOR OFFICE VISIT     Establish Care and Med Refill      ASSESSMENT & PLAN     1. Essential hypertension  - amLODIPine (NORVASC) 10 MG tablet; Take 1 tablet by mouth Daily.  Dispense: 30 tablet; Refill: 5  - metoprolol tartrate (LOPRESSOR) 50 MG tablet; Take 1 tablet by mouth 2 (Two) Times a Day.  Dispense: 30 tablet; Refill: 5  - Lipid Panel With / Chol / HDL Ratio  - Comprehensive Metabolic Panel    2. Need for hepatitis C screening test  - Hepatitis C Antibody    3. Prostate cancer screening  - PSA Screen    4. Establish care with new provider in office    Orders Placed This Encounter   Procedures   • Lipid Panel With / Chol / HDL Ratio   • Hepatitis C Antibody   • Comprehensive Metabolic Panel   • PSA Screen     New Medications Ordered This Visit   Medications   • amLODIPine (NORVASC) 10 MG tablet     Sig: Take 1 tablet by mouth Daily.     Dispense:  30 tablet     Refill:  5   • metoprolol tartrate (LOPRESSOR) 50 MG tablet     Sig: Take 1 tablet by mouth 2 (Two) Times a Day.     Dispense:  30 tablet     Refill:  5   • cetirizine (zyrTEC) 10 MG tablet     Sig: Take 1 tablet by mouth Daily.     Dispense:  30 tablet     Refill:  5       SUMMARY/DISCUSSION  • Follow-up in 6 months for Medicare wellness visit along with chronic medical follow-up.      Next Appointment with me: Visit date not found    Return in about 6 months (around 10/15/2021) for Medicare Wellness, Next scheduled follow up.      VITAL SIGNS     Visit Vitals  /56   Pulse 62   Temp 99.1 °F (37.3 °C) (Temporal)   Ht 177.8 cm (70\")   Wt 57.7 kg (127 lb 3.2 oz)   SpO2 99%   BMI 18.25 kg/m²     Wt Readings from Last 3 Encounters:   04/15/21 57.7 kg (127 lb 3.2 oz)   12/18/19 60.8 kg (134 lb)   03/12/19 63 kg (139 lb)     Body mass index is 18.25 kg/m².      MEDICATIONS AT THE TIME OF OFFICE VISIT "     Current Outpatient Medications on File Prior to Visit   Medication Sig   • [DISCONTINUED] amLODIPine (NORVASC) 10 MG tablet TAKE 1 TABLET BY MOUTH DAILY   • [DISCONTINUED] metoprolol tartrate (LOPRESSOR) 50 MG tablet Take 1 tablet by mouth 2 (Two) Times a Day.     No current facility-administered medications on file prior to visit.         HISTORY OF PRESENT ILLNESS     Patient presents to establish care with new provider in the office.  Previous patient of Amber Guerra, followed for hypertension.  Needs medication refills today.    Hypertension: Blood pressure today 122/56 with heart rate of 62.  Well-controlled on amlodipine 10 mg tablet along with Toprol tartrate 50 mg twice daily.  Compliant on medication. Denies any chest pain, shortness of air, headache, visual disturbances, lower extremity leg swelling, or heart palpitations.     Does complain of environmental allergies.  Is not taking any medication for his symptoms.  Would like advice today.    Discussed importance of colonoscopy with history of occult blood.  Patient refused.  Discussed Cologuard which patient also refused.    Discussed vaccinations.  Patient sister does not believe in vaccinations and he is unsure that he will receive them at this time.  Refused today discussion on Covid, Shingrix and tetanus, along with pneumonia vaccine.     Surgical history of hernia repair 1950 when he was a small child.    Family history of dimension mother and heart disease and heart attack in father.    Never smoker, no alcohol use currently no substance use.     REVIEW OF SYSTEMS     Constitutional neg except per HPI   ENT environmental allergies  Resp neg  CV neg    PHYSICAL EXAMINATION     Physical Exam  Constitutional  No distress  Cardiovascular Rate  normal . Rhythm: regular . Heart sounds:  normal  Pulmonary/Chest  Effort normal. Breath sounds:  normal  Psychiatric  Alert. Judgment and thought content normal. Mood normal     REVIEWED DATA     Labs:   Lab  Results   Component Value Date    GLUCOSE 97 09/27/2017    BUN 24 (H) 05/09/2018    CREATININE 1.30 (H) 05/09/2018    EGFRIFNONA 55 (L) 05/09/2018    EGFRIFAFRI 66 05/09/2018    BCR 18.5 05/09/2018    K 4.9 05/09/2018    CO2 25.3 05/09/2018    CALCIUM 9.7 05/09/2018    PROTENTOTREF 6.7 03/28/2018    ALBUMIN 4.10 03/28/2018    LABIL2 1.6 03/28/2018    AST 16 03/28/2018    ALT 10 03/28/2018   No results found for: CHOL, CHLPL, TRIG, HDL, LDL, LDLDIRECT      Imaging:           Medical Tests:             Summary of old records / correspondence / consultant report:           Request outside records:           *Examiner was wearing medical surgical mask, face shield and exam gloves during the entire duration of the visit. Patient was masked the entire time.   Minimum social distance of 6 ft maintained entire visit except if physical contact was necessary as documented.     **Dragon Disclaimer:   Much of this encounter note is an electronic transcription/translation of spoken language to printed text. The electronic translation of spoken language may permit erroneous, or at times, nonsensical words or phrases to be inadvertently transcribed. Although I have reviewed the note for such errors, some may still exist.

## 2021-04-16 LAB
ALBUMIN SERPL-MCNC: 4.5 G/DL (ref 3.5–5.2)
ALBUMIN/GLOB SERPL: 2.1 G/DL
ALP SERPL-CCNC: 82 U/L (ref 39–117)
ALT SERPL-CCNC: 11 U/L (ref 1–41)
AST SERPL-CCNC: 18 U/L (ref 1–40)
BILIRUB SERPL-MCNC: 0.3 MG/DL (ref 0–1.2)
BUN SERPL-MCNC: 24 MG/DL (ref 8–23)
BUN/CREAT SERPL: 19.5 (ref 7–25)
CALCIUM SERPL-MCNC: 9.4 MG/DL (ref 8.6–10.5)
CHLORIDE SERPL-SCNC: 107 MMOL/L (ref 98–107)
CHOLEST SERPL-MCNC: 178 MG/DL (ref 0–200)
CHOLEST/HDLC SERPL: 4.68 {RATIO}
CO2 SERPL-SCNC: 28.3 MMOL/L (ref 22–29)
CREAT SERPL-MCNC: 1.23 MG/DL (ref 0.76–1.27)
GLOBULIN SER CALC-MCNC: 2.1 GM/DL
GLUCOSE SERPL-MCNC: 86 MG/DL (ref 65–99)
HCV AB S/CO SERPL IA: <0.1 S/CO RATIO (ref 0–0.9)
HDLC SERPL-MCNC: 38 MG/DL (ref 40–60)
LDLC SERPL CALC-MCNC: 122 MG/DL (ref 0–100)
POTASSIUM SERPL-SCNC: 4.9 MMOL/L (ref 3.5–5.2)
PROT SERPL-MCNC: 6.6 G/DL (ref 6–8.5)
PSA SERPL-MCNC: 3.19 NG/ML (ref 0–4)
SODIUM SERPL-SCNC: 142 MMOL/L (ref 136–145)
TRIGL SERPL-MCNC: 99 MG/DL (ref 0–150)
VLDLC SERPL CALC-MCNC: 18 MG/DL (ref 5–40)

## 2021-09-07 DIAGNOSIS — I10 ESSENTIAL HYPERTENSION: ICD-10-CM

## 2021-09-07 RX ORDER — AMLODIPINE BESYLATE 10 MG/1
10 TABLET ORAL DAILY
Qty: 30 TABLET | Refills: 0 | Status: SHIPPED | OUTPATIENT
Start: 2021-09-07 | End: 2021-09-30

## 2021-09-07 NOTE — TELEPHONE ENCOUNTER
Caller: Jose Angel Us    Relationship: Self    Best call back number: [phone] (H)    Medication needed:   Requested Prescriptions     Pending Prescriptions Disp Refills   • amLODIPine (NORVASC) 10 MG tablet 30 tablet 5     Sig: Take 1 tablet by mouth Daily.       When do you need the refill by: 09/07/21    What additional details did the patient provide when requesting the medication:     Does the patient have less than a 3 day supply:  [] Yes  [x] No    What is the patient's preferred pharmacy: [address] ([facility]), [place] - [address] [facility] [place] [phone] [facility] [phone]

## 2021-09-28 DIAGNOSIS — I10 ESSENTIAL HYPERTENSION: ICD-10-CM

## 2021-09-28 RX ORDER — AMLODIPINE BESYLATE 10 MG/1
10 TABLET ORAL DAILY
Qty: 30 TABLET | Refills: 0 | OUTPATIENT
Start: 2021-09-28

## 2021-09-28 NOTE — TELEPHONE ENCOUNTER
Caller: Jose Angel Us    Relationship: Self      Medication requested (name and dosage): amLODIPine (NORVASC) 10 MG tablet    Pharmacy where request should be sent: [facility] [address] [place] [address] ([facility]), [place] - [address] [facility] - [phone]  - [phone]      Additional details provided by patient: PATIENT HAS 30 DAY SUPPLY LEFT BUT IS TRANSFERRING MEDS TO [facility]. PLEASE ADVISE IF PATIENT NEEDS AN APPOINTMENT TO GET REFILLS.     Best call back number: [phone]    Does the patient have less than a 3 day supply:  [] Yes  [x] No    Carlos Toure Rep   09/28/21 10:26 EDT

## 2021-09-30 DIAGNOSIS — I10 ESSENTIAL HYPERTENSION: ICD-10-CM

## 2021-09-30 RX ORDER — AMLODIPINE BESYLATE 10 MG/1
TABLET ORAL
Qty: 30 TABLET | Refills: 0 | Status: SHIPPED | OUTPATIENT
Start: 2021-09-30 | End: 2021-11-01

## 2021-11-01 DIAGNOSIS — I10 ESSENTIAL HYPERTENSION: ICD-10-CM

## 2021-11-01 RX ORDER — AMLODIPINE BESYLATE 10 MG/1
TABLET ORAL
Qty: 15 TABLET | Refills: 0 | Status: SHIPPED | OUTPATIENT
Start: 2021-11-01 | End: 2021-11-15 | Stop reason: SDUPTHER

## 2021-11-09 ENCOUNTER — TELEPHONE (OUTPATIENT)
Dept: INTERNAL MEDICINE | Age: 71
End: 2021-11-09

## 2021-11-15 ENCOUNTER — OFFICE VISIT (OUTPATIENT)
Dept: INTERNAL MEDICINE | Age: 71
End: 2021-11-15

## 2021-11-15 VITALS
BODY MASS INDEX: 18.47 KG/M2 | OXYGEN SATURATION: 98 % | TEMPERATURE: 97.7 F | DIASTOLIC BLOOD PRESSURE: 68 MMHG | HEART RATE: 63 BPM | SYSTOLIC BLOOD PRESSURE: 130 MMHG | WEIGHT: 129 LBS | HEIGHT: 70 IN

## 2021-11-15 DIAGNOSIS — I10 ESSENTIAL HYPERTENSION: Primary | ICD-10-CM

## 2021-11-15 DIAGNOSIS — E78.5 HYPERLIPIDEMIA, UNSPECIFIED HYPERLIPIDEMIA TYPE: ICD-10-CM

## 2021-11-15 PROCEDURE — 99213 OFFICE O/P EST LOW 20 MIN: CPT | Performed by: NURSE PRACTITIONER

## 2021-11-15 RX ORDER — AMLODIPINE BESYLATE 10 MG/1
10 TABLET ORAL DAILY
Qty: 90 TABLET | Refills: 3 | Status: SHIPPED | OUTPATIENT
Start: 2021-11-15 | End: 2021-11-16 | Stop reason: SDUPTHER

## 2021-11-15 RX ORDER — METOPROLOL TARTRATE 50 MG/1
50 TABLET, FILM COATED ORAL 2 TIMES DAILY
Qty: 180 TABLET | Refills: 3 | Status: SHIPPED | OUTPATIENT
Start: 2021-11-15 | End: 2021-11-16 | Stop reason: SDUPTHER

## 2021-11-15 NOTE — PROGRESS NOTES
"    I N T E R N A L  M E D I C I N E  DAVID GRAMAJO, APRN      ENCOUNTER DATE:  11/15/2021    Jose Angel Magen / 71 y.o. / male      CHIEF COMPLAINT / REASON FOR OFFICE VISIT     Med Refill and Facial Pain (sinus pressure in forehead and eyes)      ASSESSMENT & PLAN     1. Essential hypertension  - amLODIPine (NORVASC) 10 MG tablet; Take 1 tablet by mouth Daily.  Dispense: 90 tablet; Refill: 3  - metoprolol tartrate (LOPRESSOR) 50 MG tablet; Take 1 tablet by mouth 2 (Two) Times a Day.  Dispense: 180 tablet; Refill: 3  - Comprehensive Metabolic Panel  - Lipid Panel With / Chol / HDL Ratio    2. Hyperlipidemia, unspecified hyperlipidemia type  -Patient refusal of statin medication.  ASCVD risk score 27%    Orders Placed This Encounter   Procedures   • Comprehensive Metabolic Panel   • Lipid Panel With / Chol / HDL Ratio     New Medications Ordered This Visit   Medications   • amLODIPine (NORVASC) 10 MG tablet     Sig: Take 1 tablet by mouth Daily.     Dispense:  90 tablet     Refill:  3     PT NEED APPT FOR FURTHER REFILLS   • metoprolol tartrate (LOPRESSOR) 50 MG tablet     Sig: Take 1 tablet by mouth 2 (Two) Times a Day.     Dispense:  180 tablet     Refill:  3       SUMMARY/DISCUSSION  • Patient refusal of annual wellness, all vaccinations and colon cancer screening  • Follow-up recommended in 6 months for chronic medical management, earlier if needed    Next Appointment with me: Visit date not found    Return in about 6 months (around 5/15/2022) for Next scheduled follow up.      VITAL SIGNS     Visit Vitals  /68 (Cuff Size: Adult)   Pulse 63   Temp 97.7 °F (36.5 °C) (Temporal)   Ht 177.8 cm (70\")   Wt 58.5 kg (129 lb)   SpO2 98%   BMI 18.51 kg/m²     Wt Readings from Last 3 Encounters:   11/15/21 58.5 kg (129 lb)   04/15/21 57.7 kg (127 lb 3.2 oz)   12/18/19 60.8 kg (134 lb)     Body mass index is 18.51 kg/m².      MEDICATIONS AT THE TIME OF OFFICE VISIT     Current Outpatient Medications on File Prior to Visit "   Medication Sig   • cetirizine (zyrTEC) 10 MG tablet Take 1 tablet by mouth Daily.   • [DISCONTINUED] amLODIPine (NORVASC) 10 MG tablet Take 1 tablet by mouth once daily   • [DISCONTINUED] metoprolol tartrate (LOPRESSOR) 50 MG tablet Take 1 tablet by mouth 2 (Two) Times a Day.     No current facility-administered medications on file prior to visit.         HISTORY OF PRESENT ILLNESS     Patient presents for follow-up on hypertension.  Currently well controlled with amlodipine 10 mg along with metoprolol 50 mg twice daily. Denies any chest pain, shortness of air, headache, visual disturbances, lower extremity leg swelling, or heart palpitations.     Hyperlipidemia: Last  but patient ASCVD risk score of 27% due to age and blood pressure.  Warrant statin but patient refusal.    REVIEW OF SYSTEMS     Constitutional neg except per HPI   Resp neg  CV neg    PHYSICAL EXAMINATION     Physical Exam  Constitutional  No distress  Cardiovascular Rate  normal . Rhythm: regular . Heart sounds:  normal  Pulmonary/Chest  Effort normal. Breath sounds:  normal  Psychiatric  Alert. Judgment and thought content normal. Mood normal     REVIEWED DATA     Labs:   Lab Results   Component Value Date    GLUCOSE 86 04/15/2021    BUN 24 (H) 04/15/2021    CREATININE 1.23 04/15/2021    EGFRIFNONA 58 (L) 04/15/2021    EGFRIFAFRI 70 04/15/2021    BCR 19.5 04/15/2021    K 4.9 04/15/2021    CO2 28.3 04/15/2021    CALCIUM 9.4 04/15/2021    PROTENTOTREF 6.6 04/15/2021    ALBUMIN 4.50 04/15/2021    LABIL2 2.1 04/15/2021    AST 18 04/15/2021    ALT 11 04/15/2021     Lab Results   Component Value Date    CHLPL 178 04/15/2021    TRIG 99 04/15/2021    HDL 38 (L) 04/15/2021     (H) 04/15/2021     Imaging:           Medical Tests:             Summary of old records / correspondence / consultant report:           Request outside records:           *Examiner was wearing medical surgical mask, face shield and exam gloves during the entire  duration of the visit. Patient was masked the entire time.   Minimum social distance of 6 ft maintained entire visit except if physical contact was necessary as documented.     **Dragon Disclaimer:   Much of this encounter note is an electronic transcription/translation of spoken language to printed text. The electronic translation of spoken language may permit erroneous, or at times, nonsensical words or phrases to be inadvertently transcribed. Although I have reviewed the note for such errors, some may still exist.

## 2021-11-16 ENCOUNTER — TELEPHONE (OUTPATIENT)
Dept: INTERNAL MEDICINE | Age: 71
End: 2021-11-16

## 2021-11-16 DIAGNOSIS — I10 ESSENTIAL HYPERTENSION: ICD-10-CM

## 2021-11-16 LAB
ALBUMIN SERPL-MCNC: 4.4 G/DL (ref 3.7–4.7)
ALBUMIN/GLOB SERPL: 2.1 {RATIO} (ref 1.2–2.2)
ALP SERPL-CCNC: 81 IU/L (ref 44–121)
ALT SERPL-CCNC: 10 IU/L (ref 0–44)
AST SERPL-CCNC: 17 IU/L (ref 0–40)
BILIRUB SERPL-MCNC: 0.2 MG/DL (ref 0–1.2)
BUN SERPL-MCNC: 26 MG/DL (ref 8–27)
BUN/CREAT SERPL: 20 (ref 10–24)
CALCIUM SERPL-MCNC: 9.3 MG/DL (ref 8.6–10.2)
CHLORIDE SERPL-SCNC: 106 MMOL/L (ref 96–106)
CHOLEST SERPL-MCNC: 197 MG/DL (ref 100–199)
CHOLEST/HDLC SERPL: 5.3 RATIO (ref 0–5)
CO2 SERPL-SCNC: 24 MMOL/L (ref 20–29)
CREAT SERPL-MCNC: 1.28 MG/DL (ref 0.76–1.27)
GLOBULIN SER CALC-MCNC: 2.1 G/DL (ref 1.5–4.5)
GLUCOSE SERPL-MCNC: 89 MG/DL (ref 65–99)
HDLC SERPL-MCNC: 37 MG/DL
LDLC SERPL CALC-MCNC: 140 MG/DL (ref 0–99)
POTASSIUM SERPL-SCNC: 4.9 MMOL/L (ref 3.5–5.2)
PROT SERPL-MCNC: 6.5 G/DL (ref 6–8.5)
SODIUM SERPL-SCNC: 144 MMOL/L (ref 134–144)
TRIGL SERPL-MCNC: 107 MG/DL (ref 0–149)
VLDLC SERPL CALC-MCNC: 20 MG/DL (ref 5–40)

## 2021-11-16 RX ORDER — AMLODIPINE BESYLATE 10 MG/1
10 TABLET ORAL DAILY
Qty: 90 TABLET | Refills: 3 | Status: SHIPPED | OUTPATIENT
Start: 2021-11-16 | End: 2022-03-11 | Stop reason: HOSPADM

## 2021-11-16 RX ORDER — METOPROLOL TARTRATE 50 MG/1
50 TABLET, FILM COATED ORAL 2 TIMES DAILY
Qty: 180 TABLET | Refills: 3 | Status: SHIPPED | OUTPATIENT
Start: 2021-11-16 | End: 2022-03-11 | Stop reason: HOSPADM

## 2021-11-16 NOTE — TELEPHONE ENCOUNTER
Caller: Jose Angel Us    Relationship: Self    Best call back number: [phone]    Requested Prescriptions:   Requested Prescriptions     Pending Prescriptions Disp Refills   • metoprolol tartrate (LOPRESSOR) 50 MG tablet 180 tablet 3     Sig: Take 1 tablet by mouth 2 (Two) Times a Day.   • amLODIPine (NORVASC) 10 MG tablet 90 tablet 3     Sig: Take 1 tablet by mouth Daily.        Pharmacy where request should be sent: [id number] [place], [place] - [address] [facility] [place] [phone] [place] [phone]      Additional details provided by patient: ASAP     Does the patient have less than a 3 day supply:  [x] Yes  [] No    Carlos Anand Rep   11/16/21 10:30 EST

## 2022-02-02 ENCOUNTER — HOSPITAL ENCOUNTER (INPATIENT)
Facility: HOSPITAL | Age: 72
LOS: 37 days | Discharge: NURSING, REHAB OR LONG-TERM CARE | End: 2022-03-11
Attending: EMERGENCY MEDICINE | Admitting: INTERNAL MEDICINE

## 2022-02-02 ENCOUNTER — APPOINTMENT (OUTPATIENT)
Dept: GENERAL RADIOLOGY | Facility: HOSPITAL | Age: 72
End: 2022-02-02

## 2022-02-02 ENCOUNTER — APPOINTMENT (OUTPATIENT)
Dept: CARDIOLOGY | Facility: HOSPITAL | Age: 72
End: 2022-02-02

## 2022-02-02 DIAGNOSIS — E86.1 INTRAVASCULAR VOLUME DEPLETION: ICD-10-CM

## 2022-02-02 DIAGNOSIS — E46 MALNUTRITION, UNSPECIFIED TYPE: ICD-10-CM

## 2022-02-02 DIAGNOSIS — U07.1 PNEUMONIA DUE TO COVID-19 VIRUS: ICD-10-CM

## 2022-02-02 DIAGNOSIS — R60.0 BILATERAL LOWER EXTREMITY EDEMA: ICD-10-CM

## 2022-02-02 DIAGNOSIS — N17.9 AKI (ACUTE KIDNEY INJURY): ICD-10-CM

## 2022-02-02 DIAGNOSIS — U07.1 COVID-19 VIRUS INFECTION: ICD-10-CM

## 2022-02-02 DIAGNOSIS — J12.82 PNEUMONIA DUE TO COVID-19 VIRUS: ICD-10-CM

## 2022-02-02 DIAGNOSIS — J96.01 ACUTE HYPOXEMIC RESPIRATORY FAILURE: Primary | ICD-10-CM

## 2022-02-02 PROBLEM — I82.402 ACUTE DEEP VEIN THROMBOSIS (DVT) OF LEFT LOWER EXTREMITY (HCC): Status: ACTIVE | Noted: 2022-02-02

## 2022-02-02 PROBLEM — Z86.79 HISTORY OF RAYNAUD'S SYNDROME: Chronic | Status: ACTIVE | Noted: 2022-02-02

## 2022-02-02 PROBLEM — R63.4 WEIGHT LOSS: Chronic | Status: ACTIVE | Noted: 2022-02-02

## 2022-02-02 PROBLEM — I74.3: Status: ACTIVE | Noted: 2022-02-02

## 2022-02-02 LAB
ALBUMIN SERPL-MCNC: 2.8 G/DL (ref 3.5–5.2)
ALBUMIN SERPL-MCNC: 3 G/DL (ref 3.5–5.2)
ALBUMIN/GLOB SERPL: 0.8 G/DL
ALBUMIN/GLOB SERPL: 0.9 G/DL
ALP SERPL-CCNC: 106 U/L (ref 39–117)
ALP SERPL-CCNC: 92 U/L (ref 39–117)
ALT SERPL W P-5'-P-CCNC: 14 U/L (ref 1–41)
ALT SERPL W P-5'-P-CCNC: 20 U/L (ref 1–41)
ANION GAP SERPL CALCULATED.3IONS-SCNC: 12 MMOL/L (ref 5–15)
ANION GAP SERPL CALCULATED.3IONS-SCNC: 17 MMOL/L (ref 5–15)
APTT PPP: 31 SECONDS (ref 22.7–35.4)
AST SERPL-CCNC: 19 U/L (ref 1–40)
AST SERPL-CCNC: 24 U/L (ref 1–40)
B PARAPERT DNA SPEC QL NAA+PROBE: NOT DETECTED
B PERT DNA SPEC QL NAA+PROBE: NOT DETECTED
BACTERIA UR QL AUTO: NORMAL /HPF
BASOPHILS # BLD AUTO: 0.01 10*3/MM3 (ref 0–0.2)
BASOPHILS NFR BLD AUTO: 0.1 % (ref 0–1.5)
BH CV GRAFT BRACHIAL PRESSURE LEFT: 97 MMHG
BH CV GRAFT BRACHIAL PRESSURE RIGHT: 104 MMHG
BH CV LEA LEFT ANT TIBIAL A DISTAL EDV: 5 CM/S
BH CV LEA LEFT ANT TIBIAL A DISTAL PSV: 13 CM/S
BH CV LEA LEFT ANT TIBIAL A MID EDV: 6 CM/S
BH CV LEA LEFT ANT TIBIAL A MID PSV: 35 CM/S
BH CV LEA LEFT ANT TIBIAL A PROX EDV: 7 CM/S
BH CV LEA LEFT ANT TIBIAL A PROX PSV: 60 CM/S
BH CV LEA LEFT CFA DISTAL EDV: 22 CM/S
BH CV LEA LEFT CFA DISTAL PSV: 164 CM/S
BH CV LEA LEFT CFA PROX EDV: 24 CM/S
BH CV LEA LEFT CFA PROX PSV: 118 CM/S
BH CV LEA LEFT DFA PROX EDV: 18 CM/S
BH CV LEA LEFT DFA PROX PSV: 150 CM/S
BH CV LEA LEFT DPA PRESSURE: 0 MMHG
BH CV LEA LEFT EXT ILIAC EDV: 15 CM/S
BH CV LEA LEFT EXT ILIAC PSV: 69 CM/S
BH CV LEA LEFT PERONEAL  DISTAL EDV: 5 CM/S
BH CV LEA LEFT PERONEAL  DISTAL PSV: 22 CM/S
BH CV LEA LEFT PERONEAL  MID EDV: 5.5 CM/S
BH CV LEA LEFT PERONEAL  MID PSV: 24 CM/S
BH CV LEA LEFT PERONEAL  PROX EDV: 9 CM/S
BH CV LEA LEFT PERONEAL  PROX PSV: 49 CM/S
BH CV LEA LEFT POPITEAL A  DISTAL EDV: 12 CM/S
BH CV LEA LEFT POPITEAL A  DISTAL PSV: 75 CM/S
BH CV LEA LEFT POPITEAL A  PROX EDV: 7.85 CM/S
BH CV LEA LEFT POPITEAL A  PROX PSV: 62 CM/S
BH CV LEA LEFT PTA DISTAL EDV: 6 CM/S
BH CV LEA LEFT PTA DISTAL PSV: 26 CM/S
BH CV LEA LEFT PTA MID EDV: 5 CM/S
BH CV LEA LEFT PTA MID PSV: 30 CM/S
BH CV LEA LEFT PTA PRESSURE: 58 MMHG
BH CV LEA LEFT PTA PROX PSV: 25 CM/S
BH CV LEA LEFT SFA DISTAL EDV: 13 CM/S
BH CV LEA LEFT SFA DISTAL PSV: 72 CM/S
BH CV LEA LEFT SFA MID EDV: 15 CM/S
BH CV LEA LEFT SFA MID PSV: 69 CM/S
BH CV LEA LEFT SFA PROX EDV: 25 CM/S
BH CV LEA LEFT SFA PROX PSV: 97 CM/S
BH CV LEA LEFT TIBEOPERONEAL EDV: 7 CM/S
BH CV LEA LEFT TIBEOPERONEAL PSV: 60 CM/S
BH CV LEA RIGHT DPA PRESSURE: 111 MMHG
BH CV LEA RIGHT PTA PRESSURE: 108 MMHG
BH CV LOW VAS LEFT PERONEAL VESSEL: 1
BH CV LOW VAS LEFT POPLITEAL SPONT: 1
BH CV LOW VAS LEFT SOLEAL VESSEL: 1
BH CV LOWER ARTERIAL LEFT ABI RATIO: 0.56
BH CV LOWER ARTERIAL LEFT ABI RATIO: 0.56
BH CV LOWER ARTERIAL LEFT DORSALIS PEDIS SYS MAX: 0 MMHG
BH CV LOWER ARTERIAL LEFT GREAT TOE SYS MAX: 0 MMHG
BH CV LOWER ARTERIAL LEFT HIGH THIGH SYS MAX: 113 MMHG
BH CV LOWER ARTERIAL LEFT LOW THIGH SYS MAX: 100 MMHG
BH CV LOWER ARTERIAL LEFT POPLITEAL SYS MAX: 127 MMHG
BH CV LOWER ARTERIAL LEFT POST TIBIAL SYS MAX: 58 MMHG
BH CV LOWER ARTERIAL RIGHT ABI RATIO: 1.07
BH CV LOWER ARTERIAL RIGHT ABI RATIO: 1.07
BH CV LOWER ARTERIAL RIGHT DORSALIS PEDIS SYS MAX: 111 MMHG
BH CV LOWER ARTERIAL RIGHT GREAT TOE SYS MAX: 111 MMHG
BH CV LOWER ARTERIAL RIGHT HIGH THIGH SYS MAX: 153 MMHG
BH CV LOWER ARTERIAL RIGHT LOW THIGH SYS MAX: 126 MMHG
BH CV LOWER ARTERIAL RIGHT POPLITEAL SYS MAX: 109 MMHG
BH CV LOWER ARTERIAL RIGHT POST TIBIAL SYS MAX: 108 MMHG
BH CV LOWER ARTERIAL RIGHT TBI RATIO: 1.07
BH CV LOWER VASCULAR LEFT COMMON FEMORAL AUGMENT: NORMAL
BH CV LOWER VASCULAR LEFT COMMON FEMORAL COMPETENT: NORMAL
BH CV LOWER VASCULAR LEFT COMMON FEMORAL COMPRESS: NORMAL
BH CV LOWER VASCULAR LEFT COMMON FEMORAL PHASIC: NORMAL
BH CV LOWER VASCULAR LEFT COMMON FEMORAL SPONT: NORMAL
BH CV LOWER VASCULAR LEFT DISTAL FEMORAL COMPRESS: NORMAL
BH CV LOWER VASCULAR LEFT GASTRONEMIUS COMPRESS: NORMAL
BH CV LOWER VASCULAR LEFT GREATER SAPH AK COMPRESS: NORMAL
BH CV LOWER VASCULAR LEFT GREATER SAPH BK COMPRESS: NORMAL
BH CV LOWER VASCULAR LEFT LESSER SAPH COMPRESS: NORMAL
BH CV LOWER VASCULAR LEFT MID FEMORAL AUGMENT: NORMAL
BH CV LOWER VASCULAR LEFT MID FEMORAL COMPETENT: NORMAL
BH CV LOWER VASCULAR LEFT MID FEMORAL COMPRESS: NORMAL
BH CV LOWER VASCULAR LEFT MID FEMORAL PHASIC: NORMAL
BH CV LOWER VASCULAR LEFT MID FEMORAL SPONT: NORMAL
BH CV LOWER VASCULAR LEFT PERONEAL AUGMENT: NORMAL
BH CV LOWER VASCULAR LEFT PERONEAL COMPRESS: NORMAL
BH CV LOWER VASCULAR LEFT PERONEAL THROMBUS: NORMAL
BH CV LOWER VASCULAR LEFT POPLITEAL AUGMENT: NORMAL
BH CV LOWER VASCULAR LEFT POPLITEAL COMPETENT: NORMAL
BH CV LOWER VASCULAR LEFT POPLITEAL COMPRESS: NORMAL
BH CV LOWER VASCULAR LEFT POPLITEAL SPONT: NORMAL
BH CV LOWER VASCULAR LEFT POPLITEAL THROMBUS: NORMAL
BH CV LOWER VASCULAR LEFT POSTERIOR TIBIAL COMPRESS: NORMAL
BH CV LOWER VASCULAR LEFT PROFUNDA FEMORAL COMPRESS: NORMAL
BH CV LOWER VASCULAR LEFT PROXIMAL FEMORAL COMPRESS: NORMAL
BH CV LOWER VASCULAR LEFT SAPHENOFEMORAL JUNCTION COMPRESS: NORMAL
BH CV LOWER VASCULAR LEFT SOLEAL COMPRESS: NORMAL
BH CV LOWER VASCULAR LEFT SOLEAL THROMBUS: NORMAL
BH CV LOWER VASCULAR RIGHT COMMON FEMORAL AUGMENT: NORMAL
BH CV LOWER VASCULAR RIGHT COMMON FEMORAL COMPETENT: NORMAL
BH CV LOWER VASCULAR RIGHT COMMON FEMORAL COMPRESS: NORMAL
BH CV LOWER VASCULAR RIGHT COMMON FEMORAL PHASIC: NORMAL
BH CV LOWER VASCULAR RIGHT COMMON FEMORAL SPONT: NORMAL
BH CV LOWER VASCULAR RIGHT DISTAL FEMORAL COMPRESS: NORMAL
BH CV LOWER VASCULAR RIGHT GASTRONEMIUS COMPRESS: NORMAL
BH CV LOWER VASCULAR RIGHT GREATER SAPH AK COMPRESS: NORMAL
BH CV LOWER VASCULAR RIGHT GREATER SAPH BK COMPRESS: NORMAL
BH CV LOWER VASCULAR RIGHT LESSER SAPH COMPRESS: NORMAL
BH CV LOWER VASCULAR RIGHT MID FEMORAL AUGMENT: NORMAL
BH CV LOWER VASCULAR RIGHT MID FEMORAL COMPETENT: NORMAL
BH CV LOWER VASCULAR RIGHT MID FEMORAL COMPRESS: NORMAL
BH CV LOWER VASCULAR RIGHT MID FEMORAL PHASIC: NORMAL
BH CV LOWER VASCULAR RIGHT MID FEMORAL SPONT: NORMAL
BH CV LOWER VASCULAR RIGHT PERONEAL COMPRESS: NORMAL
BH CV LOWER VASCULAR RIGHT POPLITEAL AUGMENT: NORMAL
BH CV LOWER VASCULAR RIGHT POPLITEAL COMPETENT: NORMAL
BH CV LOWER VASCULAR RIGHT POPLITEAL COMPRESS: NORMAL
BH CV LOWER VASCULAR RIGHT POPLITEAL PHASIC: NORMAL
BH CV LOWER VASCULAR RIGHT POPLITEAL SPONT: NORMAL
BH CV LOWER VASCULAR RIGHT POSTERIOR TIBIAL COMPRESS: NORMAL
BH CV LOWER VASCULAR RIGHT PROFUNDA FEMORAL COMPRESS: NORMAL
BH CV LOWER VASCULAR RIGHT PROXIMAL FEMORAL COMPRESS: NORMAL
BH CV LOWER VASCULAR RIGHT SAPHENOFEMORAL JUNCTION COMPRESS: NORMAL
BILIRUB SERPL-MCNC: 0.8 MG/DL (ref 0–1.2)
BILIRUB SERPL-MCNC: 1 MG/DL (ref 0–1.2)
BILIRUB UR QL STRIP: NEGATIVE
BUN SERPL-MCNC: 80 MG/DL (ref 8–23)
BUN SERPL-MCNC: 87 MG/DL (ref 8–23)
BUN/CREAT SERPL: 27 (ref 7–25)
BUN/CREAT SERPL: 28.7 (ref 7–25)
C PNEUM DNA NPH QL NAA+NON-PROBE: NOT DETECTED
CALCIUM SPEC-SCNC: 8.3 MG/DL (ref 8.6–10.5)
CALCIUM SPEC-SCNC: 9.1 MG/DL (ref 8.6–10.5)
CHLORIDE SERPL-SCNC: 105 MMOL/L (ref 98–107)
CHLORIDE SERPL-SCNC: 97 MMOL/L (ref 98–107)
CK SERPL-CCNC: 307 U/L (ref 20–200)
CLARITY UR: CLEAR
CO2 SERPL-SCNC: 19 MMOL/L (ref 22–29)
CO2 SERPL-SCNC: 19 MMOL/L (ref 22–29)
COLOR UR: YELLOW
CREAT SERPL-MCNC: 2.79 MG/DL (ref 0.76–1.27)
CREAT SERPL-MCNC: 3.22 MG/DL (ref 0.76–1.27)
CREAT UR-MCNC: 94.3 MG/DL
CRP SERPL-MCNC: 35.95 MG/DL (ref 0–0.5)
D DIMER PPP FEU-MCNC: 4.44 MCGFEU/ML (ref 0–0.49)
D-LACTATE SERPL-SCNC: 1.4 MMOL/L (ref 0.5–2)
DEPRECATED RDW RBC AUTO: 44 FL (ref 37–54)
EOSINOPHIL # BLD AUTO: 0 10*3/MM3 (ref 0–0.4)
EOSINOPHIL NFR BLD AUTO: 0 % (ref 0.3–6.2)
ERYTHROCYTE [DISTWIDTH] IN BLOOD BY AUTOMATED COUNT: 13.7 % (ref 12.3–15.4)
FERRITIN SERPL-MCNC: 928 NG/ML (ref 30–400)
FLUAV SUBTYP SPEC NAA+PROBE: NOT DETECTED
FLUBV RNA ISLT QL NAA+PROBE: NOT DETECTED
GFR SERPL CREATININE-BSD FRML MDRD: 19 ML/MIN/1.73
GFR SERPL CREATININE-BSD FRML MDRD: 22 ML/MIN/1.73
GLOBULIN UR ELPH-MCNC: 3 GM/DL
GLOBULIN UR ELPH-MCNC: 3.6 GM/DL
GLUCOSE SERPL-MCNC: 127 MG/DL (ref 65–99)
GLUCOSE SERPL-MCNC: 88 MG/DL (ref 65–99)
GLUCOSE UR STRIP-MCNC: NEGATIVE MG/DL
HADV DNA SPEC NAA+PROBE: NOT DETECTED
HCOV 229E RNA SPEC QL NAA+PROBE: NOT DETECTED
HCOV HKU1 RNA SPEC QL NAA+PROBE: NOT DETECTED
HCOV NL63 RNA SPEC QL NAA+PROBE: NOT DETECTED
HCOV OC43 RNA SPEC QL NAA+PROBE: NOT DETECTED
HCT VFR BLD AUTO: 40.9 % (ref 37.5–51)
HGB BLD-MCNC: 13.7 G/DL (ref 13–17.7)
HGB UR QL STRIP.AUTO: ABNORMAL
HMPV RNA NPH QL NAA+NON-PROBE: NOT DETECTED
HPIV1 RNA ISLT QL NAA+PROBE: NOT DETECTED
HPIV2 RNA SPEC QL NAA+PROBE: NOT DETECTED
HPIV3 RNA NPH QL NAA+PROBE: NOT DETECTED
HPIV4 P GENE NPH QL NAA+PROBE: NOT DETECTED
HYALINE CASTS UR QL AUTO: NORMAL /LPF
IMM GRANULOCYTES # BLD AUTO: 0.04 10*3/MM3 (ref 0–0.05)
IMM GRANULOCYTES NFR BLD AUTO: 0.3 % (ref 0–0.5)
INR PPP: 1.26 (ref 0.9–1.1)
KETONES UR QL STRIP: NEGATIVE
LDH SERPL-CCNC: 328 U/L (ref 135–225)
LEUKOCYTE ESTERASE UR QL STRIP.AUTO: ABNORMAL
LIPASE SERPL-CCNC: 59 U/L (ref 13–60)
LYMPHOCYTES # BLD AUTO: 0.33 10*3/MM3 (ref 0.7–3.1)
LYMPHOCYTES NFR BLD AUTO: 2.6 % (ref 19.6–45.3)
M PNEUMO IGG SER IA-ACNC: NOT DETECTED
MAXIMAL PREDICTED HEART RATE: 147 BPM
MCH RBC QN AUTO: 29.8 PG (ref 26.6–33)
MCHC RBC AUTO-ENTMCNC: 33.5 G/DL (ref 31.5–35.7)
MCV RBC AUTO: 88.9 FL (ref 79–97)
MONOCYTES # BLD AUTO: 0.36 10*3/MM3 (ref 0.1–0.9)
MONOCYTES NFR BLD AUTO: 2.8 % (ref 5–12)
NEUTROPHILS NFR BLD AUTO: 12.09 10*3/MM3 (ref 1.7–7)
NEUTROPHILS NFR BLD AUTO: 94.2 % (ref 42.7–76)
NITRITE UR QL STRIP: NEGATIVE
NRBC BLD AUTO-RTO: 0 /100 WBC (ref 0–0.2)
NT-PROBNP SERPL-MCNC: 4330 PG/ML (ref 0–900)
OSMOLALITY SERPL: 317 MOSM/KG (ref 280–301)
PH UR STRIP.AUTO: <=5 [PH] (ref 5–8)
PLATELET # BLD AUTO: 181 10*3/MM3 (ref 140–450)
PMV BLD AUTO: 10.6 FL (ref 6–12)
POTASSIUM SERPL-SCNC: 5.5 MMOL/L (ref 3.5–5.2)
POTASSIUM SERPL-SCNC: 5.7 MMOL/L (ref 3.5–5.2)
PROCALCITONIN SERPL-MCNC: 1.36 NG/ML (ref 0–0.25)
PROT ?TM UR-MCNC: 29.5 MG/DL
PROT SERPL-MCNC: 5.8 G/DL (ref 6–8.5)
PROT SERPL-MCNC: 6.6 G/DL (ref 6–8.5)
PROT UR QL STRIP: ABNORMAL
PROTHROMBIN TIME: 15.8 SECONDS (ref 11.7–14.2)
QT INTERVAL: 340 MS
RBC # BLD AUTO: 4.6 10*6/MM3 (ref 4.14–5.8)
RBC # UR STRIP: NORMAL /HPF
REF LAB TEST METHOD: NORMAL
RHINOVIRUS RNA SPEC NAA+PROBE: NOT DETECTED
RSV RNA NPH QL NAA+NON-PROBE: NOT DETECTED
SARS-COV-2 RNA NPH QL NAA+NON-PROBE: DETECTED
SODIUM SERPL-SCNC: 133 MMOL/L (ref 136–145)
SODIUM SERPL-SCNC: 136 MMOL/L (ref 136–145)
SODIUM UR-SCNC: <20 MMOL/L
SP GR UR STRIP: 1.02 (ref 1–1.03)
SQUAMOUS #/AREA URNS HPF: NORMAL /HPF
STRESS TARGET HR: 125 BPM
TROPONIN T SERPL-MCNC: 0.02 NG/ML (ref 0–0.03)
UPPER ARTERIAL LEFT ARM BRACHIAL SYS MAX: 97 MMHG
UPPER ARTERIAL RIGHT ARM BRACHIAL SYS MAX: 104 MMHG
UROBILINOGEN UR QL STRIP: ABNORMAL
WBC # UR STRIP: NORMAL /HPF
WBC NRBC COR # BLD: 12.83 10*3/MM3 (ref 3.4–10.8)

## 2022-02-02 PROCEDURE — 85025 COMPLETE CBC W/AUTO DIFF WBC: CPT | Performed by: EMERGENCY MEDICINE

## 2022-02-02 PROCEDURE — 93970 EXTREMITY STUDY: CPT

## 2022-02-02 PROCEDURE — 87040 BLOOD CULTURE FOR BACTERIA: CPT | Performed by: EMERGENCY MEDICINE

## 2022-02-02 PROCEDURE — 84484 ASSAY OF TROPONIN QUANT: CPT | Performed by: EMERGENCY MEDICINE

## 2022-02-02 PROCEDURE — 80053 COMPREHEN METABOLIC PANEL: CPT | Performed by: EMERGENCY MEDICINE

## 2022-02-02 PROCEDURE — 25010000002 HEPARIN (PORCINE) PER 1000 UNITS: Performed by: INTERNAL MEDICINE

## 2022-02-02 PROCEDURE — 85610 PROTHROMBIN TIME: CPT | Performed by: INTERNAL MEDICINE

## 2022-02-02 PROCEDURE — 25010000002 HEPARIN (PORCINE) 25000-0.45 UT/250ML-% SOLUTION: Performed by: INTERNAL MEDICINE

## 2022-02-02 PROCEDURE — 84300 ASSAY OF URINE SODIUM: CPT | Performed by: INTERNAL MEDICINE

## 2022-02-02 PROCEDURE — 83615 LACTATE (LD) (LDH) ENZYME: CPT | Performed by: INTERNAL MEDICINE

## 2022-02-02 PROCEDURE — 83605 ASSAY OF LACTIC ACID: CPT | Performed by: EMERGENCY MEDICINE

## 2022-02-02 PROCEDURE — 93005 ELECTROCARDIOGRAM TRACING: CPT | Performed by: EMERGENCY MEDICINE

## 2022-02-02 PROCEDURE — 93926 LOWER EXTREMITY STUDY: CPT

## 2022-02-02 PROCEDURE — 94799 UNLISTED PULMONARY SVC/PX: CPT

## 2022-02-02 PROCEDURE — 99285 EMERGENCY DEPT VISIT HI MDM: CPT

## 2022-02-02 PROCEDURE — 83930 ASSAY OF BLOOD OSMOLALITY: CPT | Performed by: INTERNAL MEDICINE

## 2022-02-02 PROCEDURE — 84145 PROCALCITONIN (PCT): CPT | Performed by: INTERNAL MEDICINE

## 2022-02-02 PROCEDURE — 86140 C-REACTIVE PROTEIN: CPT | Performed by: INTERNAL MEDICINE

## 2022-02-02 PROCEDURE — 85379 FIBRIN DEGRADATION QUANT: CPT | Performed by: INTERNAL MEDICINE

## 2022-02-02 PROCEDURE — 81001 URINALYSIS AUTO W/SCOPE: CPT | Performed by: INTERNAL MEDICINE

## 2022-02-02 PROCEDURE — 83690 ASSAY OF LIPASE: CPT | Performed by: EMERGENCY MEDICINE

## 2022-02-02 PROCEDURE — 71045 X-RAY EXAM CHEST 1 VIEW: CPT

## 2022-02-02 PROCEDURE — 93923 UPR/LXTR ART STDY 3+ LVLS: CPT

## 2022-02-02 PROCEDURE — 80053 COMPREHEN METABOLIC PANEL: CPT | Performed by: INTERNAL MEDICINE

## 2022-02-02 PROCEDURE — 93010 ELECTROCARDIOGRAM REPORT: CPT | Performed by: INTERNAL MEDICINE

## 2022-02-02 PROCEDURE — 84156 ASSAY OF PROTEIN URINE: CPT | Performed by: INTERNAL MEDICINE

## 2022-02-02 PROCEDURE — 82728 ASSAY OF FERRITIN: CPT | Performed by: INTERNAL MEDICINE

## 2022-02-02 PROCEDURE — 0202U NFCT DS 22 TRGT SARS-COV-2: CPT | Performed by: EMERGENCY MEDICINE

## 2022-02-02 PROCEDURE — 85730 THROMBOPLASTIN TIME PARTIAL: CPT | Performed by: INTERNAL MEDICINE

## 2022-02-02 PROCEDURE — 83880 ASSAY OF NATRIURETIC PEPTIDE: CPT | Performed by: EMERGENCY MEDICINE

## 2022-02-02 PROCEDURE — 25010000002 DEXAMETHASONE PER 1 MG: Performed by: EMERGENCY MEDICINE

## 2022-02-02 PROCEDURE — 82570 ASSAY OF URINE CREATININE: CPT | Performed by: INTERNAL MEDICINE

## 2022-02-02 PROCEDURE — 82550 ASSAY OF CK (CPK): CPT | Performed by: INTERNAL MEDICINE

## 2022-02-02 RX ORDER — ALBUTEROL SULFATE 90 UG/1
2 AEROSOL, METERED RESPIRATORY (INHALATION) EVERY 6 HOURS PRN
Status: DISCONTINUED | OUTPATIENT
Start: 2022-02-02 | End: 2022-03-11 | Stop reason: HOSPADM

## 2022-02-02 RX ORDER — METOPROLOL TARTRATE 50 MG/1
50 TABLET, FILM COATED ORAL 2 TIMES DAILY
Status: DISCONTINUED | OUTPATIENT
Start: 2022-02-02 | End: 2022-02-05

## 2022-02-02 RX ORDER — MUSCLE RUB CREAM 100; 150 MG/G; MG/G
CREAM TOPICAL
Status: DISCONTINUED | OUTPATIENT
Start: 2022-02-02 | End: 2022-03-11 | Stop reason: HOSPADM

## 2022-02-02 RX ORDER — POLYETHYLENE GLYCOL 3350 17 G/17G
17 POWDER, FOR SOLUTION ORAL DAILY PRN
Status: DISCONTINUED | OUTPATIENT
Start: 2022-02-02 | End: 2022-02-24

## 2022-02-02 RX ORDER — BISACODYL 10 MG
10 SUPPOSITORY, RECTAL RECTAL DAILY PRN
Status: DISCONTINUED | OUTPATIENT
Start: 2022-02-02 | End: 2022-02-24

## 2022-02-02 RX ORDER — SODIUM CHLORIDE 9 MG/ML
125 INJECTION, SOLUTION INTRAVENOUS CONTINUOUS
Status: DISCONTINUED | OUTPATIENT
Start: 2022-02-02 | End: 2022-02-02

## 2022-02-02 RX ORDER — CALCIUM CARBONATE 200(500)MG
2 TABLET,CHEWABLE ORAL 2 TIMES DAILY PRN
Status: DISCONTINUED | OUTPATIENT
Start: 2022-02-02 | End: 2022-03-11 | Stop reason: HOSPADM

## 2022-02-02 RX ORDER — ONDANSETRON 2 MG/ML
4 INJECTION INTRAMUSCULAR; INTRAVENOUS EVERY 6 HOURS PRN
Status: DISCONTINUED | OUTPATIENT
Start: 2022-02-02 | End: 2022-03-11 | Stop reason: HOSPADM

## 2022-02-02 RX ORDER — ACETAMINOPHEN 325 MG/1
650 TABLET ORAL EVERY 4 HOURS PRN
Status: DISCONTINUED | OUTPATIENT
Start: 2022-02-02 | End: 2022-03-11 | Stop reason: HOSPADM

## 2022-02-02 RX ORDER — BISACODYL 5 MG/1
5 TABLET, DELAYED RELEASE ORAL DAILY PRN
Status: DISCONTINUED | OUTPATIENT
Start: 2022-02-02 | End: 2022-02-24

## 2022-02-02 RX ORDER — NITROGLYCERIN 0.4 MG/1
0.4 TABLET SUBLINGUAL
Status: DISCONTINUED | OUTPATIENT
Start: 2022-02-02 | End: 2022-03-11 | Stop reason: HOSPADM

## 2022-02-02 RX ORDER — AMLODIPINE BESYLATE 10 MG/1
10 TABLET ORAL DAILY
Status: DISCONTINUED | OUTPATIENT
Start: 2022-02-03 | End: 2022-02-03

## 2022-02-02 RX ORDER — AMOXICILLIN 250 MG
2 CAPSULE ORAL 2 TIMES DAILY
Status: DISCONTINUED | OUTPATIENT
Start: 2022-02-02 | End: 2022-02-24

## 2022-02-02 RX ORDER — UREA 10 %
1 LOTION (ML) TOPICAL NIGHTLY PRN
Status: DISCONTINUED | OUTPATIENT
Start: 2022-02-02 | End: 2022-03-11 | Stop reason: HOSPADM

## 2022-02-02 RX ORDER — BENZONATATE 100 MG/1
100 CAPSULE ORAL 3 TIMES DAILY PRN
Status: DISCONTINUED | OUTPATIENT
Start: 2022-02-02 | End: 2022-03-11 | Stop reason: HOSPADM

## 2022-02-02 RX ORDER — ASPIRIN 81 MG/1
81 TABLET ORAL DAILY
Status: DISCONTINUED | OUTPATIENT
Start: 2022-02-03 | End: 2022-02-05

## 2022-02-02 RX ORDER — SODIUM CHLORIDE 0.9 % (FLUSH) 0.9 %
10 SYRINGE (ML) INJECTION AS NEEDED
Status: DISCONTINUED | OUTPATIENT
Start: 2022-02-02 | End: 2022-03-11 | Stop reason: HOSPADM

## 2022-02-02 RX ORDER — ACETAMINOPHEN 650 MG/1
650 SUPPOSITORY RECTAL EVERY 4 HOURS PRN
Status: DISCONTINUED | OUTPATIENT
Start: 2022-02-02 | End: 2022-03-11 | Stop reason: HOSPADM

## 2022-02-02 RX ORDER — DEXAMETHASONE SODIUM PHOSPHATE 10 MG/ML
6 INJECTION INTRAMUSCULAR; INTRAVENOUS ONCE
Status: COMPLETED | OUTPATIENT
Start: 2022-02-02 | End: 2022-02-02

## 2022-02-02 RX ORDER — HEPARIN SODIUM 5000 [USP'U]/ML
40-80 INJECTION, SOLUTION INTRAVENOUS; SUBCUTANEOUS EVERY 6 HOURS PRN
Status: DISCONTINUED | OUTPATIENT
Start: 2022-02-02 | End: 2022-02-07

## 2022-02-02 RX ORDER — ACETAMINOPHEN 160 MG/5ML
650 SOLUTION ORAL EVERY 4 HOURS PRN
Status: DISCONTINUED | OUTPATIENT
Start: 2022-02-02 | End: 2022-03-11 | Stop reason: HOSPADM

## 2022-02-02 RX ORDER — ONDANSETRON 4 MG/1
4 TABLET, FILM COATED ORAL EVERY 6 HOURS PRN
Status: DISCONTINUED | OUTPATIENT
Start: 2022-02-02 | End: 2022-03-11 | Stop reason: HOSPADM

## 2022-02-02 RX ORDER — SODIUM CHLORIDE 9 MG/ML
125 INJECTION, SOLUTION INTRAVENOUS CONTINUOUS
Status: DISCONTINUED | OUTPATIENT
Start: 2022-02-02 | End: 2022-02-04

## 2022-02-02 RX ORDER — HEPARIN SODIUM 5000 [USP'U]/ML
80 INJECTION, SOLUTION INTRAVENOUS; SUBCUTANEOUS ONCE
Status: COMPLETED | OUTPATIENT
Start: 2022-02-02 | End: 2022-02-02

## 2022-02-02 RX ORDER — HEPARIN SODIUM 10000 [USP'U]/100ML
18 INJECTION, SOLUTION INTRAVENOUS
Status: DISCONTINUED | OUTPATIENT
Start: 2022-02-02 | End: 2022-02-05

## 2022-02-02 RX ADMIN — DEXAMETHASONE SODIUM PHOSPHATE 6 MG: 10 INJECTION, SOLUTION INTRAMUSCULAR; INTRAVENOUS at 13:48

## 2022-02-02 RX ADMIN — SODIUM CHLORIDE 500 ML: 9 INJECTION, SOLUTION INTRAVENOUS at 10:28

## 2022-02-02 RX ADMIN — SODIUM CHLORIDE 125 ML/HR: 9 INJECTION, SOLUTION INTRAVENOUS at 21:17

## 2022-02-02 RX ADMIN — METOPROLOL TARTRATE 50 MG: 50 TABLET, FILM COATED ORAL at 23:09

## 2022-02-02 RX ADMIN — SODIUM CHLORIDE 500 ML: 9 INJECTION, SOLUTION INTRAVENOUS at 13:46

## 2022-02-02 RX ADMIN — DOCUSATE SODIUM 50MG AND SENNOSIDES 8.6MG 2 TABLET: 8.6; 5 TABLET, FILM COATED ORAL at 23:09

## 2022-02-02 RX ADMIN — HEPARIN SODIUM 18 UNITS/KG/HR: 10000 INJECTION, SOLUTION INTRAVENOUS at 16:52

## 2022-02-02 RX ADMIN — SODIUM CHLORIDE 125 ML/HR: 9 INJECTION, SOLUTION INTRAVENOUS at 10:29

## 2022-02-02 RX ADMIN — HEPARIN SODIUM 4700 UNITS: 5000 INJECTION, SOLUTION INTRAVENOUS; SUBCUTANEOUS at 16:48

## 2022-02-03 ENCOUNTER — APPOINTMENT (OUTPATIENT)
Dept: CARDIOLOGY | Facility: HOSPITAL | Age: 72
End: 2022-02-03

## 2022-02-03 PROBLEM — E43 SEVERE MALNUTRITION: Status: ACTIVE | Noted: 2022-02-03

## 2022-02-03 LAB
ALBUMIN SERPL-MCNC: 2.4 G/DL (ref 3.5–5.2)
ALBUMIN/GLOB SERPL: 0.8 G/DL
ALP SERPL-CCNC: 87 U/L (ref 39–117)
ALT SERPL W P-5'-P-CCNC: 17 U/L (ref 1–41)
ANION GAP SERPL CALCULATED.3IONS-SCNC: 12.6 MMOL/L (ref 5–15)
AORTIC ARCH: 1.8 CM
AORTIC DIMENSIONLESS INDEX: 0.7 (DI)
APTT PPP: 122.5 SECONDS (ref 22.7–35.4)
APTT PPP: 60.2 SECONDS (ref 22.7–35.4)
APTT PPP: 85.9 SECONDS (ref 22.7–35.4)
APTT PPP: 89.3 SECONDS (ref 22.7–35.4)
ASCENDING AORTA: 3.8 CM
AST SERPL-CCNC: 16 U/L (ref 1–40)
BASOPHILS # BLD AUTO: 0.01 10*3/MM3 (ref 0–0.2)
BASOPHILS # BLD AUTO: 0.04 10*3/MM3 (ref 0–0.2)
BASOPHILS NFR BLD AUTO: 0.1 % (ref 0–1.5)
BASOPHILS NFR BLD AUTO: 0.3 % (ref 0–1.5)
BH CV ECHO MEAS - AO ACC TIME: 0.11 SEC
BH CV ECHO MEAS - AO MAX PG (FULL): 2.5 MMHG
BH CV ECHO MEAS - AO MAX PG: 4.4 MMHG
BH CV ECHO MEAS - AO MEAN PG (FULL): 1.5 MMHG
BH CV ECHO MEAS - AO MEAN PG: 2.3 MMHG
BH CV ECHO MEAS - AO V2 MAX: 105.2 CM/SEC
BH CV ECHO MEAS - AO V2 MEAN: 69.2 CM/SEC
BH CV ECHO MEAS - AO V2 VTI: 22.6 CM
BH CV ECHO MEAS - ASC AORTA: 3.8 CM
BH CV ECHO MEAS - AVA(I,A): 2.3 CM^2
BH CV ECHO MEAS - AVA(I,D): 2.3 CM^2
BH CV ECHO MEAS - AVA(V,A): 2.1 CM^2
BH CV ECHO MEAS - AVA(V,D): 2.1 CM^2
BH CV ECHO MEAS - BSA(HAYCOCK): 1.6 M^2
BH CV ECHO MEAS - BSA: 1.7 M^2
BH CV ECHO MEAS - BZI_BMI: 19.3 KILOGRAMS/M^2
BH CV ECHO MEAS - BZI_METRIC_HEIGHT: 172 CM
BH CV ECHO MEAS - BZI_METRIC_WEIGHT: 57 KG
BH CV ECHO MEAS - EDV(CUBED): 98.9 ML
BH CV ECHO MEAS - EDV(MOD-SP4): 76 ML
BH CV ECHO MEAS - EDV(TEICH): 98.6 ML
BH CV ECHO MEAS - EF(CUBED): 65.5 %
BH CV ECHO MEAS - EF(MOD-SP4): 59.2 %
BH CV ECHO MEAS - EF(TEICH): 57.1 %
BH CV ECHO MEAS - ESV(CUBED): 34.1 ML
BH CV ECHO MEAS - ESV(MOD-SP4): 31 ML
BH CV ECHO MEAS - ESV(TEICH): 42.3 ML
BH CV ECHO MEAS - FS: 29.9 %
BH CV ECHO MEAS - IVS/LVPW: 1.1
BH CV ECHO MEAS - IVSD: 0.93 CM
BH CV ECHO MEAS - LAT PEAK E' VEL: 13.1 CM/SEC
BH CV ECHO MEAS - LV DIASTOLIC VOL/BSA (35-75): 45.4 ML/M^2
BH CV ECHO MEAS - LV MASS(C)D: 135.4 GRAMS
BH CV ECHO MEAS - LV MASS(C)DI: 81 GRAMS/M^2
BH CV ECHO MEAS - LV MAX PG: 1.9 MMHG
BH CV ECHO MEAS - LV MEAN PG: 0.82 MMHG
BH CV ECHO MEAS - LV SYSTOLIC VOL/BSA (12-30): 18.5 ML/M^2
BH CV ECHO MEAS - LV V1 MAX: 69 CM/SEC
BH CV ECHO MEAS - LV V1 MEAN: 40.5 CM/SEC
BH CV ECHO MEAS - LV V1 VTI: 16.2 CM
BH CV ECHO MEAS - LVIDD: 4.6 CM
BH CV ECHO MEAS - LVIDS: 3.2 CM
BH CV ECHO MEAS - LVLD AP4: 7.7 CM
BH CV ECHO MEAS - LVLS AP4: 5.5 CM
BH CV ECHO MEAS - LVOT AREA (M): 3.1 CM^2
BH CV ECHO MEAS - LVOT AREA: 3.2 CM^2
BH CV ECHO MEAS - LVOT DIAM: 2 CM
BH CV ECHO MEAS - LVPWD: 0.84 CM
BH CV ECHO MEAS - MED PEAK E' VEL: 8.9 CM/SEC
BH CV ECHO MEAS - MV A DUR: 0.12 SEC
BH CV ECHO MEAS - MV A MAX VEL: 53.1 CM/SEC
BH CV ECHO MEAS - MV DEC SLOPE: 242.6 CM/SEC^2
BH CV ECHO MEAS - MV DEC TIME: 0.17 SEC
BH CV ECHO MEAS - MV E MAX VEL: 73.3 CM/SEC
BH CV ECHO MEAS - MV E/A: 1.4
BH CV ECHO MEAS - MV MAX PG: 2.2 MMHG
BH CV ECHO MEAS - MV MEAN PG: 0.92 MMHG
BH CV ECHO MEAS - MV P1/2T MAX VEL: 76.8 CM/SEC
BH CV ECHO MEAS - MV P1/2T: 92.7 MSEC
BH CV ECHO MEAS - MV V2 MAX: 74.5 CM/SEC
BH CV ECHO MEAS - MV V2 MEAN: 44.2 CM/SEC
BH CV ECHO MEAS - MV V2 VTI: 27.9 CM
BH CV ECHO MEAS - MVA P1/2T LCG: 2.9 CM^2
BH CV ECHO MEAS - MVA(P1/2T): 2.4 CM^2
BH CV ECHO MEAS - MVA(VTI): 1.9 CM^2
BH CV ECHO MEAS - PA ACC TIME: 0.11 SEC
BH CV ECHO MEAS - PA MAX PG (FULL): 0.63 MMHG
BH CV ECHO MEAS - PA MAX PG: 1.6 MMHG
BH CV ECHO MEAS - PA PR(ACCEL): 27.9 MMHG
BH CV ECHO MEAS - PA V2 MAX: 63.7 CM/SEC
BH CV ECHO MEAS - PULM DIAS VEL: 47.1 CM/SEC
BH CV ECHO MEAS - PULM S/D: 0.69
BH CV ECHO MEAS - PULM SYS VEL: 32.6 CM/SEC
BH CV ECHO MEAS - PVA(V,A): 1.7 CM^2
BH CV ECHO MEAS - PVA(V,D): 1.7 CM^2
BH CV ECHO MEAS - QP/QS: 0.46
BH CV ECHO MEAS - RAP SYSTOLE: 3 MMHG
BH CV ECHO MEAS - RV MAX PG: 0.99 MMHG
BH CV ECHO MEAS - RV MEAN PG: 0.46 MMHG
BH CV ECHO MEAS - RV V1 MAX: 49.7 CM/SEC
BH CV ECHO MEAS - RV V1 MEAN: 31.8 CM/SEC
BH CV ECHO MEAS - RV V1 VTI: 10.9 CM
BH CV ECHO MEAS - RVOT AREA: 2.2 CM^2
BH CV ECHO MEAS - RVOT DIAM: 1.7 CM
BH CV ECHO MEAS - RVSP: 20.8 MMHG
BH CV ECHO MEAS - SI(CUBED): 38.8 ML/M^2
BH CV ECHO MEAS - SI(LVOT): 31.5 ML/M^2
BH CV ECHO MEAS - SI(MOD-SP4): 26.9 ML/M^2
BH CV ECHO MEAS - SI(TEICH): 33.6 ML/M^2
BH CV ECHO MEAS - SUP REN AO DIAM: 2.2 CM
BH CV ECHO MEAS - SV(CUBED): 64.8 ML
BH CV ECHO MEAS - SV(LVOT): 52.6 ML
BH CV ECHO MEAS - SV(MOD-SP4): 45 ML
BH CV ECHO MEAS - SV(RVOT): 24.1 ML
BH CV ECHO MEAS - SV(TEICH): 56.3 ML
BH CV ECHO MEAS - TR MAX VEL: 211.1 CM/SEC
BH CV ECHO MEASUREMENTS AVERAGE E/E' RATIO: 6.66
BH CV VAS BP LEFT ARM: NORMAL MMHG
BH CV XLRA - RV BASE: 2.5 CM
BH CV XLRA - RV LENGTH: 6.3 CM
BH CV XLRA - RV MID: 1.9 CM
BILIRUB SERPL-MCNC: 0.7 MG/DL (ref 0–1.2)
BUN SERPL-MCNC: 69 MG/DL (ref 8–23)
BUN/CREAT SERPL: 31.8 (ref 7–25)
CA-I BLD-MCNC: 4.8 MG/DL (ref 4.6–5.4)
CA-I SERPL ISE-MCNC: 1.2 MMOL/L (ref 1.15–1.35)
CALCIUM SPEC-SCNC: 8.1 MG/DL (ref 8.6–10.5)
CHLORIDE SERPL-SCNC: 111 MMOL/L (ref 98–107)
CHOLEST SERPL-MCNC: 118 MG/DL (ref 0–200)
CO2 SERPL-SCNC: 17.4 MMOL/L (ref 22–29)
CREAT SERPL-MCNC: 2.17 MG/DL (ref 0.76–1.27)
CRP SERPL-MCNC: 21.53 MG/DL (ref 0–0.5)
D DIMER PPP FEU-MCNC: 4.7 MCGFEU/ML (ref 0–0.49)
DEPRECATED RDW RBC AUTO: 46.2 FL (ref 37–54)
DEPRECATED RDW RBC AUTO: 46.4 FL (ref 37–54)
EOSINOPHIL # BLD AUTO: 0 10*3/MM3 (ref 0–0.4)
EOSINOPHIL # BLD AUTO: 0 10*3/MM3 (ref 0–0.4)
EOSINOPHIL NFR BLD AUTO: 0 % (ref 0.3–6.2)
EOSINOPHIL NFR BLD AUTO: 0 % (ref 0.3–6.2)
ERYTHROCYTE [DISTWIDTH] IN BLOOD BY AUTOMATED COUNT: 13.6 % (ref 12.3–15.4)
ERYTHROCYTE [DISTWIDTH] IN BLOOD BY AUTOMATED COUNT: 14.5 % (ref 12.3–15.4)
GFR SERPL CREATININE-BSD FRML MDRD: 30 ML/MIN/1.73
GLOBULIN UR ELPH-MCNC: 3.1 GM/DL
GLUCOSE SERPL-MCNC: 85 MG/DL (ref 65–99)
HCT VFR BLD AUTO: 34.3 % (ref 37.5–51)
HCT VFR BLD AUTO: 36.1 % (ref 37.5–51)
HDLC SERPL-MCNC: 27 MG/DL (ref 40–60)
HGB BLD-MCNC: 11.7 G/DL (ref 13–17.7)
HGB BLD-MCNC: 11.7 G/DL (ref 13–17.7)
IMM GRANULOCYTES # BLD AUTO: 0.04 10*3/MM3 (ref 0–0.05)
IMM GRANULOCYTES # BLD AUTO: 0.06 10*3/MM3 (ref 0–0.05)
IMM GRANULOCYTES NFR BLD AUTO: 0.3 % (ref 0–0.5)
IMM GRANULOCYTES NFR BLD AUTO: 0.5 % (ref 0–0.5)
LDLC SERPL CALC-MCNC: 65 MG/DL (ref 0–100)
LDLC/HDLC SERPL: 2.27 {RATIO}
LYMPHOCYTES # BLD AUTO: 0.3 10*3/MM3 (ref 0.7–3.1)
LYMPHOCYTES # BLD AUTO: 0.31 10*3/MM3 (ref 0.7–3.1)
LYMPHOCYTES NFR BLD AUTO: 2.5 % (ref 19.6–45.3)
LYMPHOCYTES NFR BLD AUTO: 2.5 % (ref 19.6–45.3)
MAGNESIUM SERPL-MCNC: 2.6 MG/DL (ref 1.6–2.4)
MCH RBC QN AUTO: 29.7 PG (ref 26.6–33)
MCH RBC QN AUTO: 30.5 PG (ref 26.6–33)
MCHC RBC AUTO-ENTMCNC: 32.4 G/DL (ref 31.5–35.7)
MCHC RBC AUTO-ENTMCNC: 34.1 G/DL (ref 31.5–35.7)
MCV RBC AUTO: 89.3 FL (ref 79–97)
MCV RBC AUTO: 91.6 FL (ref 79–97)
MONOCYTES # BLD AUTO: 0.41 10*3/MM3 (ref 0.1–0.9)
MONOCYTES # BLD AUTO: 0.44 10*3/MM3 (ref 0.1–0.9)
MONOCYTES NFR BLD AUTO: 3.3 % (ref 5–12)
MONOCYTES NFR BLD AUTO: 3.7 % (ref 5–12)
NEUTROPHILS NFR BLD AUTO: 10.95 10*3/MM3 (ref 1.7–7)
NEUTROPHILS NFR BLD AUTO: 11.82 10*3/MM3 (ref 1.7–7)
NEUTROPHILS NFR BLD AUTO: 93 % (ref 42.7–76)
NEUTROPHILS NFR BLD AUTO: 93.8 % (ref 42.7–76)
NRBC BLD AUTO-RTO: 0 /100 WBC (ref 0–0.2)
NRBC BLD AUTO-RTO: 0 /100 WBC (ref 0–0.2)
PHOSPHATE SERPL-MCNC: 4.3 MG/DL (ref 2.5–4.5)
PLATELET # BLD AUTO: 150 10*3/MM3 (ref 140–450)
PLATELET # BLD AUTO: 180 10*3/MM3 (ref 140–450)
PMV BLD AUTO: 10.6 FL (ref 6–12)
PMV BLD AUTO: 11.6 FL (ref 6–12)
POTASSIUM SERPL-SCNC: 4.8 MMOL/L (ref 3.5–5.2)
PROT SERPL-MCNC: 5.5 G/DL (ref 6–8.5)
QT INTERVAL: 398 MS
QT INTERVAL: 411 MS
RBC # BLD AUTO: 3.84 10*6/MM3 (ref 4.14–5.8)
RBC # BLD AUTO: 3.94 10*6/MM3 (ref 4.14–5.8)
SINUS: 3.5 CM
SODIUM SERPL-SCNC: 141 MMOL/L (ref 136–145)
STJ: 2.9 CM
TRIGL SERPL-MCNC: 148 MG/DL (ref 0–150)
TROPONIN T SERPL-MCNC: 0.01 NG/ML (ref 0–0.03)
TSH SERPL DL<=0.05 MIU/L-ACNC: 1.05 UIU/ML (ref 0.27–4.2)
VLDLC SERPL-MCNC: 26 MG/DL (ref 5–40)
WBC NRBC COR # BLD: 11.79 10*3/MM3 (ref 3.4–10.8)
WBC NRBC COR # BLD: 12.59 10*3/MM3 (ref 3.4–10.8)

## 2022-02-03 PROCEDURE — 80053 COMPREHEN METABOLIC PANEL: CPT | Performed by: INTERNAL MEDICINE

## 2022-02-03 PROCEDURE — 25010000002 HEPARIN (PORCINE) PER 1000 UNITS: Performed by: INTERNAL MEDICINE

## 2022-02-03 PROCEDURE — 85730 THROMBOPLASTIN TIME PARTIAL: CPT | Performed by: INTERNAL MEDICINE

## 2022-02-03 PROCEDURE — 94799 UNLISTED PULMONARY SVC/PX: CPT

## 2022-02-03 PROCEDURE — 84443 ASSAY THYROID STIM HORMONE: CPT | Performed by: INTERNAL MEDICINE

## 2022-02-03 PROCEDURE — 82330 ASSAY OF CALCIUM: CPT | Performed by: INTERNAL MEDICINE

## 2022-02-03 PROCEDURE — 80061 LIPID PANEL: CPT | Performed by: INTERNAL MEDICINE

## 2022-02-03 PROCEDURE — 97535 SELF CARE MNGMENT TRAINING: CPT

## 2022-02-03 PROCEDURE — 86140 C-REACTIVE PROTEIN: CPT | Performed by: INTERNAL MEDICINE

## 2022-02-03 PROCEDURE — 93306 TTE W/DOPPLER COMPLETE: CPT

## 2022-02-03 PROCEDURE — 36415 COLL VENOUS BLD VENIPUNCTURE: CPT | Performed by: INTERNAL MEDICINE

## 2022-02-03 PROCEDURE — 63710000001 DEXAMETHASONE PER 0.25 MG: Performed by: INTERNAL MEDICINE

## 2022-02-03 PROCEDURE — 97530 THERAPEUTIC ACTIVITIES: CPT

## 2022-02-03 PROCEDURE — 84484 ASSAY OF TROPONIN QUANT: CPT | Performed by: INTERNAL MEDICINE

## 2022-02-03 PROCEDURE — 93306 TTE W/DOPPLER COMPLETE: CPT | Performed by: INTERNAL MEDICINE

## 2022-02-03 PROCEDURE — 97162 PT EVAL MOD COMPLEX 30 MIN: CPT

## 2022-02-03 PROCEDURE — 84100 ASSAY OF PHOSPHORUS: CPT | Performed by: INTERNAL MEDICINE

## 2022-02-03 PROCEDURE — 93010 ELECTROCARDIOGRAM REPORT: CPT | Performed by: INTERNAL MEDICINE

## 2022-02-03 PROCEDURE — 97166 OT EVAL MOD COMPLEX 45 MIN: CPT

## 2022-02-03 PROCEDURE — 93005 ELECTROCARDIOGRAM TRACING: CPT | Performed by: INTERNAL MEDICINE

## 2022-02-03 PROCEDURE — 85025 COMPLETE CBC W/AUTO DIFF WBC: CPT | Performed by: INTERNAL MEDICINE

## 2022-02-03 PROCEDURE — 85379 FIBRIN DEGRADATION QUANT: CPT | Performed by: INTERNAL MEDICINE

## 2022-02-03 PROCEDURE — 25010000002 HEPARIN (PORCINE) 25000-0.45 UT/250ML-% SOLUTION: Performed by: INTERNAL MEDICINE

## 2022-02-03 PROCEDURE — 83735 ASSAY OF MAGNESIUM: CPT | Performed by: INTERNAL MEDICINE

## 2022-02-03 RX ORDER — DEXAMETHASONE 6 MG/1
6 TABLET ORAL
Status: COMPLETED | OUTPATIENT
Start: 2022-02-03 | End: 2022-02-11

## 2022-02-03 RX ADMIN — DEXAMETHASONE 6 MG: 6 TABLET ORAL at 09:13

## 2022-02-03 RX ADMIN — SODIUM CHLORIDE 125 ML/HR: 9 INJECTION, SOLUTION INTRAVENOUS at 17:50

## 2022-02-03 RX ADMIN — DOCUSATE SODIUM 50MG AND SENNOSIDES 8.6MG 2 TABLET: 8.6; 5 TABLET, FILM COATED ORAL at 09:13

## 2022-02-03 RX ADMIN — HEPARIN SODIUM 17 UNITS/KG/HR: 10000 INJECTION, SOLUTION INTRAVENOUS at 22:03

## 2022-02-03 RX ADMIN — DOCUSATE SODIUM 50MG AND SENNOSIDES 8.6MG 2 TABLET: 8.6; 5 TABLET, FILM COATED ORAL at 21:45

## 2022-02-03 RX ADMIN — HEPARIN SODIUM 15 UNITS/KG/HR: 10000 INJECTION, SOLUTION INTRAVENOUS at 01:58

## 2022-02-03 RX ADMIN — HEPARIN SODIUM 2276 UNITS: 5000 INJECTION, SOLUTION INTRAVENOUS; SUBCUTANEOUS at 10:47

## 2022-02-03 RX ADMIN — METOPROLOL TARTRATE 50 MG: 50 TABLET, FILM COATED ORAL at 09:13

## 2022-02-03 RX ADMIN — SODIUM CHLORIDE 125 ML/HR: 9 INJECTION, SOLUTION INTRAVENOUS at 12:21

## 2022-02-03 RX ADMIN — SODIUM CHLORIDE 125 ML/HR: 9 INJECTION, SOLUTION INTRAVENOUS at 05:13

## 2022-02-03 RX ADMIN — AMLODIPINE BESYLATE 10 MG: 10 TABLET ORAL at 09:13

## 2022-02-03 RX ADMIN — ASPIRIN 81 MG: 81 TABLET, COATED ORAL at 09:13

## 2022-02-04 LAB
ALBUMIN SERPL-MCNC: 2 G/DL (ref 3.5–5.2)
ALBUMIN/GLOB SERPL: 0.7 G/DL
ALP SERPL-CCNC: 71 U/L (ref 39–117)
ALT SERPL W P-5'-P-CCNC: 15 U/L (ref 1–41)
ANION GAP SERPL CALCULATED.3IONS-SCNC: 12 MMOL/L (ref 5–15)
APTT PPP: 72.9 SECONDS (ref 22.7–35.4)
AST SERPL-CCNC: 23 U/L (ref 1–40)
BASOPHILS # BLD AUTO: 0 10*3/MM3 (ref 0–0.2)
BASOPHILS NFR BLD AUTO: 0 % (ref 0–1.5)
BILIRUB SERPL-MCNC: 0.5 MG/DL (ref 0–1.2)
BUN SERPL-MCNC: 57 MG/DL (ref 8–23)
BUN/CREAT SERPL: 36.3 (ref 7–25)
CALCIUM SPEC-SCNC: 7.5 MG/DL (ref 8.6–10.5)
CHLORIDE SERPL-SCNC: 111 MMOL/L (ref 98–107)
CO2 SERPL-SCNC: 14 MMOL/L (ref 22–29)
CREAT SERPL-MCNC: 1.57 MG/DL (ref 0.76–1.27)
CRP SERPL-MCNC: 12.81 MG/DL (ref 0–0.5)
DEPRECATED RDW RBC AUTO: 47 FL (ref 37–54)
EOSINOPHIL # BLD AUTO: 0 10*3/MM3 (ref 0–0.4)
EOSINOPHIL NFR BLD AUTO: 0 % (ref 0.3–6.2)
ERYTHROCYTE [DISTWIDTH] IN BLOOD BY AUTOMATED COUNT: 13.7 % (ref 12.3–15.4)
FERRITIN SERPL-MCNC: 832 NG/ML (ref 30–400)
GFR SERPL CREATININE-BSD FRML MDRD: 44 ML/MIN/1.73
GLOBULIN UR ELPH-MCNC: 2.7 GM/DL
GLUCOSE SERPL-MCNC: 81 MG/DL (ref 65–99)
HCT VFR BLD AUTO: 35.4 % (ref 37.5–51)
HGB BLD-MCNC: 11.3 G/DL (ref 13–17.7)
IMM GRANULOCYTES # BLD AUTO: 0.03 10*3/MM3 (ref 0–0.05)
IMM GRANULOCYTES NFR BLD AUTO: 0.3 % (ref 0–0.5)
LYMPHOCYTES # BLD AUTO: 0.31 10*3/MM3 (ref 0.7–3.1)
LYMPHOCYTES NFR BLD AUTO: 2.6 % (ref 19.6–45.3)
MCH RBC QN AUTO: 29.6 PG (ref 26.6–33)
MCHC RBC AUTO-ENTMCNC: 31.9 G/DL (ref 31.5–35.7)
MCV RBC AUTO: 92.7 FL (ref 79–97)
MONOCYTES # BLD AUTO: 0.26 10*3/MM3 (ref 0.1–0.9)
MONOCYTES NFR BLD AUTO: 2.2 % (ref 5–12)
NEUTROPHILS NFR BLD AUTO: 11.16 10*3/MM3 (ref 1.7–7)
NEUTROPHILS NFR BLD AUTO: 94.9 % (ref 42.7–76)
NRBC BLD AUTO-RTO: 0 /100 WBC (ref 0–0.2)
PLATELET # BLD AUTO: 153 10*3/MM3 (ref 140–450)
PMV BLD AUTO: 11.5 FL (ref 6–12)
POTASSIUM SERPL-SCNC: 4.6 MMOL/L (ref 3.5–5.2)
PROT SERPL-MCNC: 4.7 G/DL (ref 6–8.5)
RBC # BLD AUTO: 3.82 10*6/MM3 (ref 4.14–5.8)
SODIUM SERPL-SCNC: 137 MMOL/L (ref 136–145)
WBC NRBC COR # BLD: 11.76 10*3/MM3 (ref 3.4–10.8)

## 2022-02-04 PROCEDURE — 82728 ASSAY OF FERRITIN: CPT | Performed by: INTERNAL MEDICINE

## 2022-02-04 PROCEDURE — 80053 COMPREHEN METABOLIC PANEL: CPT | Performed by: INTERNAL MEDICINE

## 2022-02-04 PROCEDURE — 85730 THROMBOPLASTIN TIME PARTIAL: CPT | Performed by: INTERNAL MEDICINE

## 2022-02-04 PROCEDURE — 97110 THERAPEUTIC EXERCISES: CPT

## 2022-02-04 PROCEDURE — 86140 C-REACTIVE PROTEIN: CPT | Performed by: INTERNAL MEDICINE

## 2022-02-04 PROCEDURE — 25010000002 HEPARIN (PORCINE) 25000-0.45 UT/250ML-% SOLUTION: Performed by: INTERNAL MEDICINE

## 2022-02-04 PROCEDURE — 63710000001 DEXAMETHASONE PER 0.25 MG: Performed by: INTERNAL MEDICINE

## 2022-02-04 PROCEDURE — 85025 COMPLETE CBC W/AUTO DIFF WBC: CPT | Performed by: INTERNAL MEDICINE

## 2022-02-04 RX ORDER — SODIUM BICARBONATE 650 MG/1
1300 TABLET ORAL 3 TIMES DAILY
Status: DISCONTINUED | OUTPATIENT
Start: 2022-02-04 | End: 2022-02-05

## 2022-02-04 RX ORDER — SODIUM CHLORIDE, SODIUM LACTATE, POTASSIUM CHLORIDE, CALCIUM CHLORIDE 600; 310; 30; 20 MG/100ML; MG/100ML; MG/100ML; MG/100ML
100 INJECTION, SOLUTION INTRAVENOUS CONTINUOUS
Status: DISCONTINUED | OUTPATIENT
Start: 2022-02-04 | End: 2022-02-05

## 2022-02-04 RX ADMIN — DOCUSATE SODIUM 50MG AND SENNOSIDES 8.6MG 2 TABLET: 8.6; 5 TABLET, FILM COATED ORAL at 08:59

## 2022-02-04 RX ADMIN — ASPIRIN 81 MG: 81 TABLET, COATED ORAL at 08:59

## 2022-02-04 RX ADMIN — BISACODYL 5 MG: 5 TABLET ORAL at 05:32

## 2022-02-04 RX ADMIN — METOPROLOL TARTRATE 50 MG: 50 TABLET, FILM COATED ORAL at 09:00

## 2022-02-04 RX ADMIN — SODIUM BICARBONATE 1300 MG: 650 TABLET ORAL at 12:15

## 2022-02-04 RX ADMIN — DEXAMETHASONE 6 MG: 6 TABLET ORAL at 08:59

## 2022-02-04 RX ADMIN — SODIUM BICARBONATE 1300 MG: 650 TABLET ORAL at 21:25

## 2022-02-04 RX ADMIN — SODIUM CHLORIDE 125 ML/HR: 9 INJECTION, SOLUTION INTRAVENOUS at 00:34

## 2022-02-04 RX ADMIN — DOCUSATE SODIUM 50MG AND SENNOSIDES 8.6MG 2 TABLET: 8.6; 5 TABLET, FILM COATED ORAL at 21:25

## 2022-02-04 RX ADMIN — HEPARIN SODIUM 17 UNITS/KG/HR: 10000 INJECTION, SOLUTION INTRAVENOUS at 21:29

## 2022-02-04 RX ADMIN — SODIUM CHLORIDE, POTASSIUM CHLORIDE, SODIUM LACTATE AND CALCIUM CHLORIDE 100 ML/HR: 600; 310; 30; 20 INJECTION, SOLUTION INTRAVENOUS at 10:10

## 2022-02-04 RX ADMIN — SODIUM BICARBONATE 1300 MG: 650 TABLET ORAL at 17:08

## 2022-02-05 LAB
ABO GROUP BLD: NORMAL
ALBUMIN SERPL-MCNC: 1.1 G/DL (ref 3.5–5.2)
ALBUMIN/GLOB SERPL: 1 G/DL
ALP SERPL-CCNC: 34 U/L (ref 39–117)
ALT SERPL W P-5'-P-CCNC: 7 U/L (ref 1–41)
ANION GAP SERPL CALCULATED.3IONS-SCNC: 15 MMOL/L (ref 5–15)
ANION GAP SERPL CALCULATED.3IONS-SCNC: 16.5 MMOL/L (ref 5–15)
APTT PPP: 113 SECONDS (ref 22.7–35.4)
APTT PPP: 67 SECONDS (ref 22.7–35.4)
AST SERPL-CCNC: 13 U/L (ref 1–40)
BASOPHILS # BLD AUTO: 0 10*3/MM3 (ref 0–0.2)
BASOPHILS NFR BLD AUTO: 0 % (ref 0–1.5)
BILIRUB SERPL-MCNC: 0.2 MG/DL (ref 0–1.2)
BLD GP AB SCN SERPL QL: NEGATIVE
BUN SERPL-MCNC: 25 MG/DL (ref 8–23)
BUN SERPL-MCNC: 43 MG/DL (ref 8–23)
BUN/CREAT SERPL: 29.3 (ref 7–25)
BUN/CREAT SERPL: 45.5 (ref 7–25)
CALCIUM SPEC-SCNC: 6.6 MG/DL (ref 8.6–10.5)
CALCIUM SPEC-SCNC: 7.1 MG/DL (ref 8.6–10.5)
CHLORIDE SERPL-SCNC: 103 MMOL/L (ref 98–107)
CHLORIDE SERPL-SCNC: 103 MMOL/L (ref 98–107)
CO2 SERPL-SCNC: 12.5 MMOL/L (ref 22–29)
CO2 SERPL-SCNC: 15 MMOL/L (ref 22–29)
CREAT SERPL-MCNC: 0.55 MG/DL (ref 0.76–1.27)
CREAT SERPL-MCNC: 1.47 MG/DL (ref 0.76–1.27)
CRP SERPL-MCNC: 3.25 MG/DL (ref 0–0.5)
D-LACTATE SERPL-SCNC: 7.9 MMOL/L (ref 0.5–2)
DEPRECATED RDW RBC AUTO: 42.2 FL (ref 37–54)
DEPRECATED RDW RBC AUTO: 43.6 FL (ref 37–54)
EOSINOPHIL # BLD AUTO: 0 10*3/MM3 (ref 0–0.4)
EOSINOPHIL NFR BLD AUTO: 0 % (ref 0.3–6.2)
ERYTHROCYTE [DISTWIDTH] IN BLOOD BY AUTOMATED COUNT: 13.2 % (ref 12.3–15.4)
ERYTHROCYTE [DISTWIDTH] IN BLOOD BY AUTOMATED COUNT: 13.3 % (ref 12.3–15.4)
FERRITIN SERPL-MCNC: 366 NG/ML (ref 30–400)
GFR SERPL CREATININE-BSD FRML MDRD: 146 ML/MIN/1.73
GFR SERPL CREATININE-BSD FRML MDRD: 47 ML/MIN/1.73
GLOBULIN UR ELPH-MCNC: 1.1 GM/DL
GLUCOSE BLDC GLUCOMTR-MCNC: 191 MG/DL (ref 70–130)
GLUCOSE SERPL-MCNC: 172 MG/DL (ref 65–99)
GLUCOSE SERPL-MCNC: 58 MG/DL (ref 65–99)
HCT VFR BLD AUTO: 23.6 % (ref 37.5–51)
HCT VFR BLD AUTO: 25.6 % (ref 37.5–51)
HGB BLD-MCNC: 7.6 G/DL (ref 13–17.7)
HGB BLD-MCNC: 8.6 G/DL (ref 13–17.7)
LYMPHOCYTES # BLD AUTO: 0.26 10*3/MM3 (ref 0.7–3.1)
LYMPHOCYTES NFR BLD AUTO: 2.7 % (ref 19.6–45.3)
MCH RBC QN AUTO: 29.5 PG (ref 26.6–33)
MCH RBC QN AUTO: 29.6 PG (ref 26.6–33)
MCHC RBC AUTO-ENTMCNC: 32.2 G/DL (ref 31.5–35.7)
MCHC RBC AUTO-ENTMCNC: 33.6 G/DL (ref 31.5–35.7)
MCV RBC AUTO: 87.7 FL (ref 79–97)
MCV RBC AUTO: 91.8 FL (ref 79–97)
MONOCYTES # BLD AUTO: 0.35 10*3/MM3 (ref 0.1–0.9)
MONOCYTES NFR BLD AUTO: 3.6 % (ref 5–12)
NEUTROPHILS NFR BLD AUTO: 9.08 10*3/MM3 (ref 1.7–7)
NEUTROPHILS NFR BLD AUTO: 93.4 % (ref 42.7–76)
PLATELET # BLD AUTO: 132 10*3/MM3 (ref 140–450)
PLATELET # BLD AUTO: 176 10*3/MM3 (ref 140–450)
PMV BLD AUTO: 11.4 FL (ref 6–12)
PMV BLD AUTO: 12 FL (ref 6–12)
POTASSIUM SERPL-SCNC: 4.7 MMOL/L (ref 3.5–5.2)
POTASSIUM SERPL-SCNC: 5.4 MMOL/L (ref 3.5–5.2)
PROCALCITONIN SERPL-MCNC: 0.22 NG/ML (ref 0–0.25)
PROT SERPL-MCNC: 2.2 G/DL (ref 6–8.5)
RBC # BLD AUTO: 2.57 10*6/MM3 (ref 4.14–5.8)
RBC # BLD AUTO: 2.92 10*6/MM3 (ref 4.14–5.8)
RH BLD: POSITIVE
SODIUM SERPL-SCNC: 132 MMOL/L (ref 136–145)
SODIUM SERPL-SCNC: 133 MMOL/L (ref 136–145)
T&S EXPIRATION DATE: NORMAL
WBC NRBC COR # BLD: 19.55 10*3/MM3 (ref 3.4–10.8)
WBC NRBC COR # BLD: 9.72 10*3/MM3 (ref 3.4–10.8)

## 2022-02-05 PROCEDURE — 85025 COMPLETE CBC W/AUTO DIFF WBC: CPT | Performed by: INTERNAL MEDICINE

## 2022-02-05 PROCEDURE — 86901 BLOOD TYPING SEROLOGIC RH(D): CPT

## 2022-02-05 PROCEDURE — 86901 BLOOD TYPING SEROLOGIC RH(D): CPT | Performed by: NURSE PRACTITIONER

## 2022-02-05 PROCEDURE — 97530 THERAPEUTIC ACTIVITIES: CPT

## 2022-02-05 PROCEDURE — 86920 COMPATIBILITY TEST SPIN: CPT

## 2022-02-05 PROCEDURE — 85027 COMPLETE CBC AUTOMATED: CPT | Performed by: INTERNAL MEDICINE

## 2022-02-05 PROCEDURE — 36430 TRANSFUSION BLD/BLD COMPNT: CPT

## 2022-02-05 PROCEDURE — 86900 BLOOD TYPING SEROLOGIC ABO: CPT | Performed by: NURSE PRACTITIONER

## 2022-02-05 PROCEDURE — 80053 COMPREHEN METABOLIC PANEL: CPT | Performed by: INTERNAL MEDICINE

## 2022-02-05 PROCEDURE — 86140 C-REACTIVE PROTEIN: CPT | Performed by: INTERNAL MEDICINE

## 2022-02-05 PROCEDURE — 82728 ASSAY OF FERRITIN: CPT | Performed by: INTERNAL MEDICINE

## 2022-02-05 PROCEDURE — 97110 THERAPEUTIC EXERCISES: CPT

## 2022-02-05 PROCEDURE — 82962 GLUCOSE BLOOD TEST: CPT

## 2022-02-05 PROCEDURE — 86900 BLOOD TYPING SEROLOGIC ABO: CPT

## 2022-02-05 PROCEDURE — 86923 COMPATIBILITY TEST ELECTRIC: CPT

## 2022-02-05 PROCEDURE — 84145 PROCALCITONIN (PCT): CPT | Performed by: INTERNAL MEDICINE

## 2022-02-05 PROCEDURE — 85730 THROMBOPLASTIN TIME PARTIAL: CPT | Performed by: INTERNAL MEDICINE

## 2022-02-05 PROCEDURE — 36415 COLL VENOUS BLD VENIPUNCTURE: CPT | Performed by: INTERNAL MEDICINE

## 2022-02-05 PROCEDURE — P9016 RBC LEUKOCYTES REDUCED: HCPCS

## 2022-02-05 PROCEDURE — 83605 ASSAY OF LACTIC ACID: CPT | Performed by: INTERNAL MEDICINE

## 2022-02-05 PROCEDURE — 86850 RBC ANTIBODY SCREEN: CPT | Performed by: NURSE PRACTITIONER

## 2022-02-05 PROCEDURE — 63710000001 DEXAMETHASONE PER 0.25 MG: Performed by: INTERNAL MEDICINE

## 2022-02-05 RX ORDER — PANTOPRAZOLE SODIUM 40 MG/10ML
80 INJECTION, POWDER, LYOPHILIZED, FOR SOLUTION INTRAVENOUS ONCE
Status: COMPLETED | OUTPATIENT
Start: 2022-02-05 | End: 2022-02-05

## 2022-02-05 RX ORDER — NOREPINEPHRINE BIT/0.9 % NACL 8 MG/250ML
INFUSION BOTTLE (ML) INTRAVENOUS
Status: COMPLETED
Start: 2022-02-05 | End: 2022-02-05

## 2022-02-05 RX ORDER — NOREPINEPHRINE BIT/0.9 % NACL 8 MG/250ML
.02-.3 INFUSION BOTTLE (ML) INTRAVENOUS
Status: DISCONTINUED | OUTPATIENT
Start: 2022-02-05 | End: 2022-02-10

## 2022-02-05 RX ORDER — SODIUM BICARBONATE 650 MG/1
1300 TABLET ORAL 4 TIMES DAILY
Status: DISCONTINUED | OUTPATIENT
Start: 2022-02-05 | End: 2022-02-07

## 2022-02-05 RX ADMIN — Medication 0.02 MCG/KG/MIN: at 21:59

## 2022-02-05 RX ADMIN — PANTOPRAZOLE SODIUM 40 MG: 40 INJECTION, POWDER, FOR SOLUTION INTRAVENOUS at 22:24

## 2022-02-05 RX ADMIN — SODIUM BICARBONATE 125 ML/HR: 84 INJECTION, SOLUTION INTRAVENOUS at 18:21

## 2022-02-05 RX ADMIN — SODIUM BICARBONATE 1300 MG: 650 TABLET ORAL at 09:44

## 2022-02-05 RX ADMIN — PANTOPRAZOLE SODIUM 80 MG: 40 INJECTION, POWDER, FOR SOLUTION INTRAVENOUS at 22:22

## 2022-02-05 RX ADMIN — ASPIRIN 81 MG: 81 TABLET, COATED ORAL at 09:44

## 2022-02-05 RX ADMIN — SODIUM BICARBONATE 1300 MG: 650 TABLET ORAL at 17:50

## 2022-02-05 RX ADMIN — METOPROLOL TARTRATE 50 MG: 50 TABLET, FILM COATED ORAL at 09:44

## 2022-02-05 RX ADMIN — SODIUM CHLORIDE 1000 ML: 9 INJECTION, SOLUTION INTRAVENOUS at 20:24

## 2022-02-05 RX ADMIN — DEXAMETHASONE 6 MG: 6 TABLET ORAL at 09:44

## 2022-02-05 RX ADMIN — DOCUSATE SODIUM 50MG AND SENNOSIDES 8.6MG 2 TABLET: 8.6; 5 TABLET, FILM COATED ORAL at 09:44

## 2022-02-06 LAB
ALBUMIN SERPL-MCNC: 1.8 G/DL (ref 3.5–5.2)
ALBUMIN/GLOB SERPL: 1 G/DL
ALP SERPL-CCNC: 58 U/L (ref 39–117)
ALT SERPL W P-5'-P-CCNC: 54 U/L (ref 1–41)
ANION GAP SERPL CALCULATED.3IONS-SCNC: 16 MMOL/L (ref 5–15)
APTT PPP: 27.4 SECONDS (ref 22.7–35.4)
AST SERPL-CCNC: 73 U/L (ref 1–40)
BASOPHILS # BLD AUTO: 0.05 10*3/MM3 (ref 0–0.2)
BASOPHILS NFR BLD AUTO: 0.2 % (ref 0–1.5)
BH BB BLOOD EXPIRATION DATE: NORMAL
BH BB BLOOD EXPIRATION DATE: NORMAL
BH BB BLOOD TYPE BARCODE: 5100
BH BB BLOOD TYPE BARCODE: 5100
BH BB DISPENSE STATUS: NORMAL
BH BB DISPENSE STATUS: NORMAL
BH BB PRODUCT CODE: NORMAL
BH BB PRODUCT CODE: NORMAL
BH BB UNIT NUMBER: NORMAL
BH BB UNIT NUMBER: NORMAL
BILIRUB SERPL-MCNC: 0.6 MG/DL (ref 0–1.2)
BUN SERPL-MCNC: 45 MG/DL (ref 8–23)
BUN/CREAT SERPL: 28.1 (ref 7–25)
CALCIUM SPEC-SCNC: 6.6 MG/DL (ref 8.6–10.5)
CHLORIDE SERPL-SCNC: 113 MMOL/L (ref 98–107)
CO2 SERPL-SCNC: 14 MMOL/L (ref 22–29)
CREAT SERPL-MCNC: 1.6 MG/DL (ref 0.76–1.27)
CROSSMATCH INTERPRETATION: NORMAL
CROSSMATCH INTERPRETATION: NORMAL
CRP SERPL-MCNC: 5.47 MG/DL (ref 0–0.5)
D-LACTATE SERPL-SCNC: 9.4 MMOL/L (ref 0.5–2)
DEPRECATED RDW RBC AUTO: 43 FL (ref 37–54)
EOSINOPHIL # BLD AUTO: 0 10*3/MM3 (ref 0–0.4)
EOSINOPHIL NFR BLD AUTO: 0 % (ref 0.3–6.2)
ERYTHROCYTE [DISTWIDTH] IN BLOOD BY AUTOMATED COUNT: 13 % (ref 12.3–15.4)
FERRITIN SERPL-MCNC: 2669 NG/ML (ref 30–400)
GFR SERPL CREATININE-BSD FRML MDRD: 43 ML/MIN/1.73
GLOBULIN UR ELPH-MCNC: 1.8 GM/DL
GLUCOSE SERPL-MCNC: 155 MG/DL (ref 65–99)
HCT VFR BLD AUTO: 30.2 % (ref 37.5–51)
HGB BLD-MCNC: 10.2 G/DL (ref 13–17.7)
IMM GRANULOCYTES # BLD AUTO: 0.33 10*3/MM3 (ref 0–0.05)
IMM GRANULOCYTES NFR BLD AUTO: 1.3 % (ref 0–0.5)
LYMPHOCYTES # BLD AUTO: 0.58 10*3/MM3 (ref 0.7–3.1)
LYMPHOCYTES NFR BLD AUTO: 2.3 % (ref 19.6–45.3)
MCH RBC QN AUTO: 30.4 PG (ref 26.6–33)
MCHC RBC AUTO-ENTMCNC: 33.8 G/DL (ref 31.5–35.7)
MCV RBC AUTO: 90.1 FL (ref 79–97)
MONOCYTES # BLD AUTO: 1.01 10*3/MM3 (ref 0.1–0.9)
MONOCYTES NFR BLD AUTO: 4 % (ref 5–12)
NEUTROPHILS NFR BLD AUTO: 23.1 10*3/MM3 (ref 1.7–7)
NEUTROPHILS NFR BLD AUTO: 92.2 % (ref 42.7–76)
NRBC BLD AUTO-RTO: 0 /100 WBC (ref 0–0.2)
PLATELET # BLD AUTO: 114 10*3/MM3 (ref 140–450)
PMV BLD AUTO: 12.5 FL (ref 6–12)
POTASSIUM SERPL-SCNC: 5 MMOL/L (ref 3.5–5.2)
PROCALCITONIN SERPL-MCNC: 0.75 NG/ML (ref 0–0.25)
PROT SERPL-MCNC: 3.6 G/DL (ref 6–8.5)
RBC # BLD AUTO: 3.35 10*6/MM3 (ref 4.14–5.8)
SODIUM SERPL-SCNC: 143 MMOL/L (ref 136–145)
UNIT  ABO: NORMAL
UNIT  ABO: NORMAL
UNIT  RH: NORMAL
UNIT  RH: NORMAL
WBC NRBC COR # BLD: 25.07 10*3/MM3 (ref 3.4–10.8)

## 2022-02-06 PROCEDURE — 85730 THROMBOPLASTIN TIME PARTIAL: CPT | Performed by: INTERNAL MEDICINE

## 2022-02-06 PROCEDURE — 86140 C-REACTIVE PROTEIN: CPT | Performed by: INTERNAL MEDICINE

## 2022-02-06 PROCEDURE — 85025 COMPLETE CBC W/AUTO DIFF WBC: CPT | Performed by: INTERNAL MEDICINE

## 2022-02-06 PROCEDURE — 80053 COMPREHEN METABOLIC PANEL: CPT | Performed by: INTERNAL MEDICINE

## 2022-02-06 PROCEDURE — 25010000002 ADENOSINE PER 6 MG

## 2022-02-06 PROCEDURE — 84145 PROCALCITONIN (PCT): CPT | Performed by: INTERNAL MEDICINE

## 2022-02-06 PROCEDURE — 82728 ASSAY OF FERRITIN: CPT | Performed by: INTERNAL MEDICINE

## 2022-02-06 PROCEDURE — 99222 1ST HOSP IP/OBS MODERATE 55: CPT | Performed by: INTERNAL MEDICINE

## 2022-02-06 PROCEDURE — 63710000001 DEXAMETHASONE PER 0.25 MG: Performed by: INTERNAL MEDICINE

## 2022-02-06 PROCEDURE — 93005 ELECTROCARDIOGRAM TRACING: CPT | Performed by: INTERNAL MEDICINE

## 2022-02-06 RX ORDER — ADENOSINE 3 MG/ML
6 INJECTION, SOLUTION INTRAVENOUS ONCE
Status: COMPLETED | OUTPATIENT
Start: 2022-02-07 | End: 2022-02-06

## 2022-02-06 RX ORDER — MIDODRINE HYDROCHLORIDE 5 MG/1
10 TABLET ORAL
Status: DISCONTINUED | OUTPATIENT
Start: 2022-02-06 | End: 2022-02-11

## 2022-02-06 RX ORDER — DIGOXIN 0.25 MG/ML
500 INJECTION INTRAMUSCULAR; INTRAVENOUS ONCE
Status: COMPLETED | OUTPATIENT
Start: 2022-02-07 | End: 2022-02-06

## 2022-02-06 RX ORDER — ADENOSINE 3 MG/ML
INJECTION, SOLUTION INTRAVENOUS
Status: COMPLETED
Start: 2022-02-06 | End: 2022-02-06

## 2022-02-06 RX ADMIN — PANTOPRAZOLE SODIUM 8 MG/HR: 40 INJECTION, POWDER, FOR SOLUTION INTRAVENOUS at 21:55

## 2022-02-06 RX ADMIN — SODIUM BICARBONATE 1300 MG: 650 TABLET ORAL at 08:16

## 2022-02-06 RX ADMIN — Medication 0.22 MCG/KG/MIN: at 11:43

## 2022-02-06 RX ADMIN — DEXAMETHASONE 6 MG: 6 TABLET ORAL at 08:16

## 2022-02-06 RX ADMIN — MIDODRINE HYDROCHLORIDE 10 MG: 5 TABLET ORAL at 17:24

## 2022-02-06 RX ADMIN — SODIUM BICARBONATE 1300 MG: 650 TABLET ORAL at 17:24

## 2022-02-06 RX ADMIN — PANTOPRAZOLE SODIUM 40 MG: 40 INJECTION, POWDER, FOR SOLUTION INTRAVENOUS at 08:16

## 2022-02-06 RX ADMIN — ADENOSINE 6 MG: 3 INJECTION INTRAVENOUS at 23:28

## 2022-02-06 RX ADMIN — SODIUM BICARBONATE 125 ML/HR: 84 INJECTION, SOLUTION INTRAVENOUS at 21:05

## 2022-02-06 RX ADMIN — Medication 0.25 MCG/KG/MIN: at 02:47

## 2022-02-06 RX ADMIN — DIGOXIN 500 MCG: 0.25 INJECTION INTRAMUSCULAR; INTRAVENOUS at 23:33

## 2022-02-06 RX ADMIN — PANTOPRAZOLE SODIUM 40 MG: 40 INJECTION, POWDER, FOR SOLUTION INTRAVENOUS at 02:45

## 2022-02-06 RX ADMIN — Medication 0.1 MCG/KG/MIN: at 21:57

## 2022-02-06 RX ADMIN — SODIUM BICARBONATE 125 ML/HR: 84 INJECTION, SOLUTION INTRAVENOUS at 03:13

## 2022-02-06 RX ADMIN — SODIUM BICARBONATE 125 ML/HR: 84 INJECTION, SOLUTION INTRAVENOUS at 11:43

## 2022-02-06 RX ADMIN — DOCUSATE SODIUM 50MG AND SENNOSIDES 8.6MG 2 TABLET: 8.6; 5 TABLET, FILM COATED ORAL at 21:39

## 2022-02-06 RX ADMIN — SODIUM BICARBONATE 1300 MG: 650 TABLET ORAL at 11:43

## 2022-02-06 RX ADMIN — ADENOSINE 6 MG: 3 INJECTION, SOLUTION INTRAVENOUS at 23:28

## 2022-02-06 RX ADMIN — MIDODRINE HYDROCHLORIDE 10 MG: 5 TABLET ORAL at 11:43

## 2022-02-06 RX ADMIN — SODIUM BICARBONATE 1300 MG: 650 TABLET ORAL at 21:39

## 2022-02-06 RX ADMIN — PANTOPRAZOLE SODIUM 40 MG: 40 INJECTION, POWDER, FOR SOLUTION INTRAVENOUS at 17:24

## 2022-02-07 ENCOUNTER — APPOINTMENT (OUTPATIENT)
Dept: GENERAL RADIOLOGY | Facility: HOSPITAL | Age: 72
End: 2022-02-07

## 2022-02-07 LAB
ALBUMIN SERPL-MCNC: 1.5 G/DL (ref 3.5–5.2)
ALBUMIN/GLOB SERPL: 0.8 G/DL
ALP SERPL-CCNC: 59 U/L (ref 39–117)
ALT SERPL W P-5'-P-CCNC: 40 U/L (ref 1–41)
ANION GAP SERPL CALCULATED.3IONS-SCNC: 10.1 MMOL/L (ref 5–15)
ANION GAP SERPL CALCULATED.3IONS-SCNC: 12.3 MMOL/L (ref 5–15)
ANISOCYTOSIS BLD QL: ABNORMAL
APTT PPP: 26.1 SECONDS (ref 22.7–35.4)
ARTERIAL PATENCY WRIST A: POSITIVE
ARTERIAL PATENCY WRIST A: POSITIVE
AST SERPL-CCNC: 40 U/L (ref 1–40)
ATMOSPHERIC PRESS: 752.4 MMHG
ATMOSPHERIC PRESS: 753.2 MMHG
BACTERIA SPEC AEROBE CULT: NORMAL
BACTERIA SPEC AEROBE CULT: NORMAL
BASE EXCESS BLDA CALC-SCNC: 6.5 MMOL/L (ref 0–2)
BASE EXCESS BLDA CALC-SCNC: 7.2 MMOL/L (ref 0–2)
BASOPHILS # BLD AUTO: 0.02 10*3/MM3 (ref 0–0.2)
BASOPHILS NFR BLD AUTO: 0.1 % (ref 0–1.5)
BDY SITE: ABNORMAL
BDY SITE: ABNORMAL
BILIRUB SERPL-MCNC: 0.4 MG/DL (ref 0–1.2)
BUN SERPL-MCNC: 49 MG/DL (ref 8–23)
BUN SERPL-MCNC: 50 MG/DL (ref 8–23)
BUN/CREAT SERPL: 25 (ref 7–25)
BUN/CREAT SERPL: 25.6 (ref 7–25)
BURR CELLS BLD QL SMEAR: ABNORMAL
CALCIUM SPEC-SCNC: 6.2 MG/DL (ref 8.6–10.5)
CALCIUM SPEC-SCNC: 6.6 MG/DL (ref 8.6–10.5)
CHLORIDE SERPL-SCNC: 97 MMOL/L (ref 98–107)
CHLORIDE SERPL-SCNC: 99 MMOL/L (ref 98–107)
CO2 SERPL-SCNC: 27.7 MMOL/L (ref 22–29)
CO2 SERPL-SCNC: 32.9 MMOL/L (ref 22–29)
CREAT SERPL-MCNC: 1.95 MG/DL (ref 0.76–1.27)
CREAT SERPL-MCNC: 1.96 MG/DL (ref 0.76–1.27)
CRP SERPL-MCNC: 13.07 MG/DL (ref 0–0.5)
D-LACTATE SERPL-SCNC: 5.2 MMOL/L (ref 0.5–2)
DEPRECATED RDW RBC AUTO: 42.9 FL (ref 37–54)
DEPRECATED RDW RBC AUTO: 44.4 FL (ref 37–54)
EOSINOPHIL # BLD AUTO: 0 10*3/MM3 (ref 0–0.4)
EOSINOPHIL NFR BLD AUTO: 0 % (ref 0.3–6.2)
ERYTHROCYTE [DISTWIDTH] IN BLOOD BY AUTOMATED COUNT: 13.3 % (ref 12.3–15.4)
ERYTHROCYTE [DISTWIDTH] IN BLOOD BY AUTOMATED COUNT: 13.4 % (ref 12.3–15.4)
FERRITIN SERPL-MCNC: 968 NG/ML (ref 30–400)
GAS FLOW AIRWAY: 10 LPM
GAS FLOW AIRWAY: 8 LPM
GFR SERPL CREATININE-BSD FRML MDRD: 34 ML/MIN/1.73
GFR SERPL CREATININE-BSD FRML MDRD: 34 ML/MIN/1.73
GLOBULIN UR ELPH-MCNC: 2 GM/DL
GLUCOSE BLDC GLUCOMTR-MCNC: 100 MG/DL (ref 70–130)
GLUCOSE SERPL-MCNC: 119 MG/DL (ref 65–99)
GLUCOSE SERPL-MCNC: 303 MG/DL (ref 65–99)
HCO3 BLDA-SCNC: 28.3 MMOL/L (ref 22–28)
HCO3 BLDA-SCNC: 28.7 MMOL/L (ref 22–28)
HCT VFR BLD AUTO: 21.6 % (ref 37.5–51)
HCT VFR BLD AUTO: 24.9 % (ref 37.5–51)
HCT VFR BLD AUTO: 25.3 % (ref 37.5–51)
HCT VFR BLD AUTO: 27.2 % (ref 37.5–51)
HGB BLD-MCNC: 7.3 G/DL (ref 13–17.7)
HGB BLD-MCNC: 8.3 G/DL (ref 13–17.7)
HGB BLD-MCNC: 8.4 G/DL (ref 13–17.7)
HGB BLD-MCNC: 9.3 G/DL (ref 13–17.7)
INR PPP: 1.18 (ref 0.9–1.1)
LYMPHOCYTES # BLD AUTO: 0.94 10*3/MM3 (ref 0.7–3.1)
LYMPHOCYTES # BLD MANUAL: 0 10*3/MM3 (ref 0.7–3.1)
LYMPHOCYTES NFR BLD AUTO: 3.8 % (ref 19.6–45.3)
LYMPHOCYTES NFR BLD MANUAL: 2 % (ref 5–12)
MCH RBC QN AUTO: 30.2 PG (ref 26.6–33)
MCH RBC QN AUTO: 30.6 PG (ref 26.6–33)
MCHC RBC AUTO-ENTMCNC: 33.2 G/DL (ref 31.5–35.7)
MCHC RBC AUTO-ENTMCNC: 34.2 G/DL (ref 31.5–35.7)
MCV RBC AUTO: 89.5 FL (ref 79–97)
MCV RBC AUTO: 91 FL (ref 79–97)
MICROCYTES BLD QL: ABNORMAL
MODALITY: ABNORMAL
MODALITY: ABNORMAL
MONOCYTES # BLD AUTO: 0.77 10*3/MM3 (ref 0.1–0.9)
MONOCYTES # BLD: 0.45 10*3/MM3 (ref 0.1–0.9)
MONOCYTES NFR BLD AUTO: 3.1 % (ref 5–12)
MRSA DNA SPEC QL NAA+PROBE: NORMAL
NEUTROPHILS # BLD AUTO: 21.88 10*3/MM3 (ref 1.7–7)
NEUTROPHILS NFR BLD AUTO: 22.93 10*3/MM3 (ref 1.7–7)
NEUTROPHILS NFR BLD AUTO: 92.2 % (ref 42.7–76)
NEUTROPHILS NFR BLD MANUAL: 98 % (ref 42.7–76)
PCO2 BLDA: 27.8 MM HG (ref 35–45)
PCO2 BLDA: 28.4 MM HG (ref 35–45)
PH BLDA: 7.61 PH UNITS (ref 7.35–7.45)
PH BLDA: 7.62 PH UNITS (ref 7.35–7.45)
PLAT MORPH BLD: NORMAL
PLATELET # BLD AUTO: 126 10*3/MM3 (ref 140–450)
PLATELET # BLD AUTO: 136 10*3/MM3 (ref 140–450)
PMV BLD AUTO: 11.7 FL (ref 6–12)
PMV BLD AUTO: 11.8 FL (ref 6–12)
PO2 BLDA: 46.8 MM HG (ref 80–100)
PO2 BLDA: 52.4 MM HG (ref 80–100)
POIKILOCYTOSIS BLD QL SMEAR: ABNORMAL
POTASSIUM SERPL-SCNC: 4.3 MMOL/L (ref 3.5–5.2)
POTASSIUM SERPL-SCNC: 4.5 MMOL/L (ref 3.5–5.2)
PROCALCITONIN SERPL-MCNC: 1.28 NG/ML (ref 0–0.25)
PROT SERPL-MCNC: 3.5 G/DL (ref 6–8.5)
PROTHROMBIN TIME: 14.9 SECONDS (ref 11.7–14.2)
QT INTERVAL: 257 MS
RBC # BLD AUTO: 2.78 10*6/MM3 (ref 4.14–5.8)
RBC # BLD AUTO: 3.04 10*6/MM3 (ref 4.14–5.8)
SAO2 % BLDCOA: 90.2 % (ref 92–99)
SAO2 % BLDCOA: 92.5 % (ref 92–99)
SET MECH RESP RATE: 20
SODIUM SERPL-SCNC: 139 MMOL/L (ref 136–145)
SODIUM SERPL-SCNC: 140 MMOL/L (ref 136–145)
TOTAL RATE: 20 BREATHS/MINUTE
TOTAL RATE: 20 BREATHS/MINUTE
VANCOMYCIN SERPL-MCNC: 22.4 MCG/ML (ref 5–40)
VARIANT LYMPHS NFR BLD MANUAL: 0 % (ref 19.6–45.3)
WBC MORPH BLD: NORMAL
WBC NRBC COR # BLD: 22.33 10*3/MM3 (ref 3.4–10.8)
WBC NRBC COR # BLD: 24.85 10*3/MM3 (ref 3.4–10.8)

## 2022-02-07 PROCEDURE — 82962 GLUCOSE BLOOD TEST: CPT

## 2022-02-07 PROCEDURE — 85018 HEMOGLOBIN: CPT | Performed by: INTERNAL MEDICINE

## 2022-02-07 PROCEDURE — 80202 ASSAY OF VANCOMYCIN: CPT | Performed by: INTERNAL MEDICINE

## 2022-02-07 PROCEDURE — 85730 THROMBOPLASTIN TIME PARTIAL: CPT | Performed by: INTERNAL MEDICINE

## 2022-02-07 PROCEDURE — 25010000002 DIGOXIN PER 500 MCG: Performed by: INTERNAL MEDICINE

## 2022-02-07 PROCEDURE — 86140 C-REACTIVE PROTEIN: CPT | Performed by: INTERNAL MEDICINE

## 2022-02-07 PROCEDURE — 83605 ASSAY OF LACTIC ACID: CPT | Performed by: INTERNAL MEDICINE

## 2022-02-07 PROCEDURE — 85610 PROTHROMBIN TIME: CPT | Performed by: STUDENT IN AN ORGANIZED HEALTH CARE EDUCATION/TRAINING PROGRAM

## 2022-02-07 PROCEDURE — 84145 PROCALCITONIN (PCT): CPT | Performed by: INTERNAL MEDICINE

## 2022-02-07 PROCEDURE — C1751 CATH, INF, PER/CENT/MIDLINE: HCPCS

## 2022-02-07 PROCEDURE — 85025 COMPLETE CBC W/AUTO DIFF WBC: CPT | Performed by: INTERNAL MEDICINE

## 2022-02-07 PROCEDURE — 36600 WITHDRAWAL OF ARTERIAL BLOOD: CPT

## 2022-02-07 PROCEDURE — 93010 ELECTROCARDIOGRAM REPORT: CPT | Performed by: INTERNAL MEDICINE

## 2022-02-07 PROCEDURE — 85007 BL SMEAR W/DIFF WBC COUNT: CPT | Performed by: INTERNAL MEDICINE

## 2022-02-07 PROCEDURE — 25010000002 CEFTRIAXONE PER 250 MG: Performed by: INTERNAL MEDICINE

## 2022-02-07 PROCEDURE — 25010000002 HEPARIN (PORCINE) 25000-0.45 UT/250ML-% SOLUTION: Performed by: STUDENT IN AN ORGANIZED HEALTH CARE EDUCATION/TRAINING PROGRAM

## 2022-02-07 PROCEDURE — 99232 SBSQ HOSP IP/OBS MODERATE 35: CPT | Performed by: INTERNAL MEDICINE

## 2022-02-07 PROCEDURE — 82803 BLOOD GASES ANY COMBINATION: CPT

## 2022-02-07 PROCEDURE — 87641 MR-STAPH DNA AMP PROBE: CPT | Performed by: INTERNAL MEDICINE

## 2022-02-07 PROCEDURE — 85014 HEMATOCRIT: CPT | Performed by: INTERNAL MEDICINE

## 2022-02-07 PROCEDURE — 82728 ASSAY OF FERRITIN: CPT | Performed by: INTERNAL MEDICINE

## 2022-02-07 PROCEDURE — 25010000002 VANCOMYCIN 10 G RECONSTITUTED SOLUTION: Performed by: INTERNAL MEDICINE

## 2022-02-07 PROCEDURE — 80048 BASIC METABOLIC PNL TOTAL CA: CPT | Performed by: INTERNAL MEDICINE

## 2022-02-07 PROCEDURE — 63710000001 DEXAMETHASONE PER 0.25 MG: Performed by: INTERNAL MEDICINE

## 2022-02-07 PROCEDURE — 02HV33Z INSERTION OF INFUSION DEVICE INTO SUPERIOR VENA CAVA, PERCUTANEOUS APPROACH: ICD-10-PCS | Performed by: INTERNAL MEDICINE

## 2022-02-07 PROCEDURE — 99222 1ST HOSP IP/OBS MODERATE 55: CPT | Performed by: INTERNAL MEDICINE

## 2022-02-07 RX ORDER — DILTIAZEM HCL IN NACL,ISO-OSM 125 MG/125
5-15 PLASTIC BAG, INJECTION (ML) INTRAVENOUS
Status: DISCONTINUED | OUTPATIENT
Start: 2022-02-07 | End: 2022-02-07

## 2022-02-07 RX ORDER — HEPARIN SODIUM 10000 [USP'U]/100ML
9 INJECTION, SOLUTION INTRAVENOUS
Status: DISCONTINUED | OUTPATIENT
Start: 2022-02-07 | End: 2022-02-07

## 2022-02-07 RX ORDER — PROPOFOL 10 MG/ML
VIAL (ML) INTRAVENOUS
Status: COMPLETED
Start: 2022-02-07 | End: 2022-02-08

## 2022-02-07 RX ORDER — HEPARIN SODIUM 10000 [USP'U]/100ML
9 INJECTION, SOLUTION INTRAVENOUS CONTINUOUS
Status: DISCONTINUED | OUTPATIENT
Start: 2022-02-07 | End: 2022-02-08

## 2022-02-07 RX ADMIN — PANTOPRAZOLE SODIUM 40 MG: 40 INJECTION, POWDER, FOR SOLUTION INTRAVENOUS at 17:23

## 2022-02-07 RX ADMIN — DEXAMETHASONE 6 MG: 6 TABLET ORAL at 08:58

## 2022-02-07 RX ADMIN — VASOPRESSIN 0.03 UNITS/MIN: 20 INJECTION PARENTERAL at 00:41

## 2022-02-07 RX ADMIN — MIDODRINE HYDROCHLORIDE 10 MG: 5 TABLET ORAL at 11:43

## 2022-02-07 RX ADMIN — HEPARIN SODIUM 9 UNITS/KG/HR: 10000 INJECTION, SOLUTION INTRAVENOUS at 11:33

## 2022-02-07 RX ADMIN — SODIUM BICARBONATE 125 ML/HR: 84 INJECTION, SOLUTION INTRAVENOUS at 05:37

## 2022-02-07 RX ADMIN — CEFTRIAXONE 1 G: 1 INJECTION, POWDER, FOR SOLUTION INTRAMUSCULAR; INTRAVENOUS at 00:08

## 2022-02-07 RX ADMIN — VANCOMYCIN HYDROCHLORIDE 1500 MG: 10 INJECTION, POWDER, LYOPHILIZED, FOR SOLUTION INTRAVENOUS at 01:31

## 2022-02-07 RX ADMIN — PANTOPRAZOLE SODIUM 40 MG: 40 INJECTION, POWDER, FOR SOLUTION INTRAVENOUS at 05:36

## 2022-02-07 RX ADMIN — Medication 5 MG/HR: at 01:48

## 2022-02-07 RX ADMIN — Medication 0.2 MCG/KG/MIN: at 13:07

## 2022-02-07 RX ADMIN — Medication 0.3 MCG/KG/MIN: at 05:36

## 2022-02-07 RX ADMIN — MIDODRINE HYDROCHLORIDE 10 MG: 5 TABLET ORAL at 17:23

## 2022-02-07 RX ADMIN — VASOPRESSIN 0.03 UNITS/MIN: 20 INJECTION PARENTERAL at 11:45

## 2022-02-07 RX ADMIN — PANTOPRAZOLE SODIUM 40 MG: 40 INJECTION, POWDER, FOR SOLUTION INTRAVENOUS at 13:10

## 2022-02-07 RX ADMIN — Medication 15 MG/HR: at 05:36

## 2022-02-07 RX ADMIN — MIDODRINE HYDROCHLORIDE 10 MG: 5 TABLET ORAL at 08:57

## 2022-02-07 RX ADMIN — PANTOPRAZOLE SODIUM 40 MG: 40 INJECTION, POWDER, FOR SOLUTION INTRAVENOUS at 23:09

## 2022-02-07 RX ADMIN — SODIUM BICARBONATE 1300 MG: 650 TABLET ORAL at 08:58

## 2022-02-07 RX ADMIN — PANTOPRAZOLE SODIUM 40 MG: 40 INJECTION, POWDER, FOR SOLUTION INTRAVENOUS at 03:18

## 2022-02-07 RX ADMIN — PANTOPRAZOLE SODIUM 40 MG: 40 INJECTION, POWDER, FOR SOLUTION INTRAVENOUS at 08:57

## 2022-02-07 RX ADMIN — SODIUM BICARBONATE 125 ML/HR: 84 INJECTION, SOLUTION INTRAVENOUS at 05:36

## 2022-02-08 ENCOUNTER — APPOINTMENT (OUTPATIENT)
Dept: GENERAL RADIOLOGY | Facility: HOSPITAL | Age: 72
End: 2022-02-08

## 2022-02-08 ENCOUNTER — APPOINTMENT (OUTPATIENT)
Dept: CT IMAGING | Facility: HOSPITAL | Age: 72
End: 2022-02-08

## 2022-02-08 ENCOUNTER — ANESTHESIA EVENT (OUTPATIENT)
Dept: PERIOP | Facility: HOSPITAL | Age: 72
End: 2022-02-08

## 2022-02-08 ENCOUNTER — ANESTHESIA (OUTPATIENT)
Dept: PERIOP | Facility: HOSPITAL | Age: 72
End: 2022-02-08

## 2022-02-08 PROBLEM — K68.3 NONTRAUMATIC RETROPERITONEAL HEMATOMA: Status: ACTIVE | Noted: 2022-02-08

## 2022-02-08 PROBLEM — K66.1 NONTRAUMATIC RETROPERITONEAL HEMATOMA: Status: ACTIVE | Noted: 2022-02-08

## 2022-02-08 LAB
ANION GAP SERPL CALCULATED.3IONS-SCNC: 12.7 MMOL/L (ref 5–15)
ARTERIAL PATENCY WRIST A: POSITIVE
ATMOSPHERIC PRESS: 752.9 MMHG
BASE EXCESS BLDA CALC-SCNC: 7.2 MMOL/L (ref 0–2)
BDY SITE: ABNORMAL
BH BB BLOOD EXPIRATION DATE: NORMAL
BH BB BLOOD TYPE BARCODE: 6200
BH BB DISPENSE STATUS: NORMAL
BH BB PRODUCT CODE: NORMAL
BH BB UNIT NUMBER: NORMAL
BUN SERPL-MCNC: 47 MG/DL (ref 8–23)
BUN/CREAT SERPL: 23.9 (ref 7–25)
CALCIUM SPEC-SCNC: 7.1 MG/DL (ref 8.6–10.5)
CHLORIDE SERPL-SCNC: 104 MMOL/L (ref 98–107)
CK SERPL-CCNC: 811 U/L (ref 20–200)
CO2 SERPL-SCNC: 27.3 MMOL/L (ref 22–29)
CREAT SERPL-MCNC: 1.97 MG/DL (ref 0.76–1.27)
CROSSMATCH INTERPRETATION: NORMAL
D DIMER PPP FEU-MCNC: 2.72 MCGFEU/ML (ref 0–0.49)
DEPRECATED RDW RBC AUTO: 44.8 FL (ref 37–54)
ERYTHROCYTE [DISTWIDTH] IN BLOOD BY AUTOMATED COUNT: 13.4 % (ref 12.3–15.4)
FERRITIN SERPL-MCNC: 688 NG/ML (ref 30–400)
FIBRINOGEN PPP-MCNC: 661 MG/DL (ref 219–464)
FOLATE SERPL-MCNC: 5.8 NG/ML (ref 4.78–24.2)
GAS FLOW AIRWAY: 8 LPM
GFR SERPL CREATININE-BSD FRML MDRD: 33 ML/MIN/1.73
GLUCOSE SERPL-MCNC: 99 MG/DL (ref 65–99)
HAPTOGLOB SERPL-MCNC: 174 MG/DL (ref 30–200)
HCO3 BLDA-SCNC: 30.1 MMOL/L (ref 22–28)
HCT VFR BLD AUTO: 21 % (ref 37.5–51)
HCT VFR BLD AUTO: 27 % (ref 37.5–51)
HCT VFR BLD AUTO: 28 % (ref 37.5–51)
HGB BLD-MCNC: 7 G/DL (ref 13–17.7)
HGB BLD-MCNC: 9.2 G/DL (ref 13–17.7)
HGB BLD-MCNC: 9.4 G/DL (ref 13–17.7)
IRON 24H UR-MRATE: 15 MCG/DL (ref 59–158)
IRON SATN MFR SERPL: 12 % (ref 20–50)
LDH SERPL-CCNC: 379 U/L (ref 135–225)
MCH RBC QN AUTO: 30.6 PG (ref 26.6–33)
MCHC RBC AUTO-ENTMCNC: 33.3 G/DL (ref 31.5–35.7)
MCV RBC AUTO: 91.7 FL (ref 79–97)
MODALITY: ABNORMAL
PCO2 BLDA: 35.2 MM HG (ref 35–45)
PH BLDA: 7.54 PH UNITS (ref 7.35–7.45)
PLATELET # BLD AUTO: 83 10*3/MM3 (ref 140–450)
PMV BLD AUTO: 13 FL (ref 6–12)
PO2 BLDA: 55 MM HG (ref 80–100)
POTASSIUM SERPL-SCNC: 3.9 MMOL/L (ref 3.5–5.2)
RBC # BLD AUTO: 2.29 10*6/MM3 (ref 4.14–5.8)
SAO2 % BLDCOA: 91.8 % (ref 92–99)
SET MECH RESP RATE: 20
SODIUM SERPL-SCNC: 144 MMOL/L (ref 136–145)
TIBC SERPL-MCNC: 122 MCG/DL (ref 298–536)
TOTAL RATE: 20 BREATHS/MINUTE
TRANSFERRIN SERPL-MCNC: 82 MG/DL (ref 200–360)
UNIT  ABO: NORMAL
UNIT  RH: NORMAL
VIT B12 BLD-MCNC: 648 PG/ML (ref 211–946)
WBC NRBC COR # BLD: 25.98 10*3/MM3 (ref 3.4–10.8)

## 2022-02-08 PROCEDURE — 25010000002 CEFTRIAXONE PER 250 MG: Performed by: INTERNAL MEDICINE

## 2022-02-08 PROCEDURE — 63710000001 DEXAMETHASONE PER 0.25 MG: Performed by: INTERNAL MEDICINE

## 2022-02-08 PROCEDURE — 82550 ASSAY OF CK (CPK): CPT | Performed by: INTERNAL MEDICINE

## 2022-02-08 PROCEDURE — 82607 VITAMIN B-12: CPT | Performed by: INTERNAL MEDICINE

## 2022-02-08 PROCEDURE — 99223 1ST HOSP IP/OBS HIGH 75: CPT | Performed by: INTERNAL MEDICINE

## 2022-02-08 PROCEDURE — 85018 HEMOGLOBIN: CPT | Performed by: INTERNAL MEDICINE

## 2022-02-08 PROCEDURE — 83010 ASSAY OF HAPTOGLOBIN QUANT: CPT | Performed by: INTERNAL MEDICINE

## 2022-02-08 PROCEDURE — 82746 ASSAY OF FOLIC ACID SERUM: CPT | Performed by: INTERNAL MEDICINE

## 2022-02-08 PROCEDURE — 99232 SBSQ HOSP IP/OBS MODERATE 35: CPT | Performed by: INTERNAL MEDICINE

## 2022-02-08 PROCEDURE — 74176 CT ABD & PELVIS W/O CONTRAST: CPT

## 2022-02-08 PROCEDURE — 83615 LACTATE (LD) (LDH) ENZYME: CPT | Performed by: INTERNAL MEDICINE

## 2022-02-08 PROCEDURE — C1769 GUIDE WIRE: HCPCS | Performed by: STUDENT IN AN ORGANIZED HEALTH CARE EDUCATION/TRAINING PROGRAM

## 2022-02-08 PROCEDURE — 80048 BASIC METABOLIC PNL TOTAL CA: CPT | Performed by: INTERNAL MEDICINE

## 2022-02-08 PROCEDURE — 25010000002 CEFTRIAXONE PER 250 MG: Performed by: STUDENT IN AN ORGANIZED HEALTH CARE EDUCATION/TRAINING PROGRAM

## 2022-02-08 PROCEDURE — 36430 TRANSFUSION BLD/BLD COMPNT: CPT

## 2022-02-08 PROCEDURE — 25010000002 VANCOMYCIN 750 MG RECONSTITUTED SOLUTION: Performed by: INTERNAL MEDICINE

## 2022-02-08 PROCEDURE — 82728 ASSAY OF FERRITIN: CPT | Performed by: INTERNAL MEDICINE

## 2022-02-08 PROCEDURE — 25010000002 PHENYLEPHRINE 10 MG/ML SOLUTION: Performed by: ANESTHESIOLOGY

## 2022-02-08 PROCEDURE — C1880 VENA CAVA FILTER: HCPCS | Performed by: STUDENT IN AN ORGANIZED HEALTH CARE EDUCATION/TRAINING PROGRAM

## 2022-02-08 PROCEDURE — 0 CEFAZOLIN IN DEXTROSE 2-4 GM/100ML-% SOLUTION: Performed by: STUDENT IN AN ORGANIZED HEALTH CARE EDUCATION/TRAINING PROGRAM

## 2022-02-08 PROCEDURE — 85027 COMPLETE CBC AUTOMATED: CPT | Performed by: INTERNAL MEDICINE

## 2022-02-08 PROCEDURE — 36600 WITHDRAWAL OF ARTERIAL BLOOD: CPT

## 2022-02-08 PROCEDURE — 25010000002 PROPOFOL 10 MG/ML EMULSION: Performed by: ANESTHESIOLOGY

## 2022-02-08 PROCEDURE — 86160 COMPLEMENT ANTIGEN: CPT | Performed by: INTERNAL MEDICINE

## 2022-02-08 PROCEDURE — 85384 FIBRINOGEN ACTIVITY: CPT | Performed by: INTERNAL MEDICINE

## 2022-02-08 PROCEDURE — 85014 HEMATOCRIT: CPT | Performed by: STUDENT IN AN ORGANIZED HEALTH CARE EDUCATION/TRAINING PROGRAM

## 2022-02-08 PROCEDURE — 85379 FIBRIN DEGRADATION QUANT: CPT | Performed by: INTERNAL MEDICINE

## 2022-02-08 PROCEDURE — 83540 ASSAY OF IRON: CPT | Performed by: INTERNAL MEDICINE

## 2022-02-08 PROCEDURE — P9016 RBC LEUKOCYTES REDUCED: HCPCS

## 2022-02-08 PROCEDURE — 25010000002 HEPARIN (PORCINE) PER 1000 UNITS: Performed by: STUDENT IN AN ORGANIZED HEALTH CARE EDUCATION/TRAINING PROGRAM

## 2022-02-08 PROCEDURE — 86900 BLOOD TYPING SEROLOGIC ABO: CPT

## 2022-02-08 PROCEDURE — 94761 N-INVAS EAR/PLS OXIMETRY MLT: CPT

## 2022-02-08 PROCEDURE — 25010000002 NA FERRIC GLUC CPLX PER 12.5 MG: Performed by: INTERNAL MEDICINE

## 2022-02-08 PROCEDURE — 0 LIDOCAINE 1 % SOLUTION 20 ML VIAL: Performed by: STUDENT IN AN ORGANIZED HEALTH CARE EDUCATION/TRAINING PROGRAM

## 2022-02-08 PROCEDURE — 0 IOPAMIDOL PER 1 ML: Performed by: STUDENT IN AN ORGANIZED HEALTH CARE EDUCATION/TRAINING PROGRAM

## 2022-02-08 PROCEDURE — 85018 HEMOGLOBIN: CPT | Performed by: STUDENT IN AN ORGANIZED HEALTH CARE EDUCATION/TRAINING PROGRAM

## 2022-02-08 PROCEDURE — 86022 PLATELET ANTIBODIES: CPT | Performed by: INTERNAL MEDICINE

## 2022-02-08 PROCEDURE — 82803 BLOOD GASES ANY COMBINATION: CPT

## 2022-02-08 PROCEDURE — 06H03DZ INSERTION OF INTRALUMINAL DEVICE INTO INFERIOR VENA CAVA, PERCUTANEOUS APPROACH: ICD-10-PCS | Performed by: STUDENT IN AN ORGANIZED HEALTH CARE EDUCATION/TRAINING PROGRAM

## 2022-02-08 PROCEDURE — 94799 UNLISTED PULMONARY SVC/PX: CPT

## 2022-02-08 PROCEDURE — 85014 HEMATOCRIT: CPT | Performed by: INTERNAL MEDICINE

## 2022-02-08 PROCEDURE — 84466 ASSAY OF TRANSFERRIN: CPT | Performed by: INTERNAL MEDICINE

## 2022-02-08 RX ORDER — EPHEDRINE SULFATE 50 MG/ML
5 INJECTION, SOLUTION INTRAVENOUS ONCE AS NEEDED
Status: DISCONTINUED | OUTPATIENT
Start: 2022-02-08 | End: 2022-02-08

## 2022-02-08 RX ORDER — HYDRALAZINE HYDROCHLORIDE 20 MG/ML
5 INJECTION INTRAMUSCULAR; INTRAVENOUS
Status: DISCONTINUED | OUTPATIENT
Start: 2022-02-08 | End: 2022-02-08

## 2022-02-08 RX ORDER — ONDANSETRON 2 MG/ML
4 INJECTION INTRAMUSCULAR; INTRAVENOUS ONCE AS NEEDED
Status: DISCONTINUED | OUTPATIENT
Start: 2022-02-08 | End: 2022-02-08

## 2022-02-08 RX ORDER — PHENYLEPHRINE HYDROCHLORIDE 10 MG/ML
INJECTION INTRAVENOUS AS NEEDED
Status: DISCONTINUED | OUTPATIENT
Start: 2022-02-08 | End: 2022-02-08 | Stop reason: SURG

## 2022-02-08 RX ORDER — PROPOFOL 10 MG/ML
VIAL (ML) INTRAVENOUS CONTINUOUS PRN
Status: DISCONTINUED | OUTPATIENT
Start: 2022-02-08 | End: 2022-02-08 | Stop reason: SURG

## 2022-02-08 RX ORDER — IBUPROFEN 600 MG/1
600 TABLET ORAL ONCE AS NEEDED
Status: DISCONTINUED | OUTPATIENT
Start: 2022-02-08 | End: 2022-02-08

## 2022-02-08 RX ORDER — SODIUM CHLORIDE 9 MG/ML
INJECTION, SOLUTION INTRAVENOUS CONTINUOUS PRN
Status: DISCONTINUED | OUTPATIENT
Start: 2022-02-08 | End: 2022-02-08 | Stop reason: SURG

## 2022-02-08 RX ORDER — PROPOFOL 10 MG/ML
VIAL (ML) INTRAVENOUS AS NEEDED
Status: DISCONTINUED | OUTPATIENT
Start: 2022-02-08 | End: 2022-02-08 | Stop reason: SURG

## 2022-02-08 RX ORDER — CEFAZOLIN SODIUM 2 G/100ML
2 INJECTION, SOLUTION INTRAVENOUS ONCE
Status: COMPLETED | OUTPATIENT
Start: 2022-02-08 | End: 2022-02-08

## 2022-02-08 RX ORDER — OXYCODONE AND ACETAMINOPHEN 7.5; 325 MG/1; MG/1
1 TABLET ORAL EVERY 4 HOURS PRN
Status: DISCONTINUED | OUTPATIENT
Start: 2022-02-08 | End: 2022-02-08

## 2022-02-08 RX ORDER — PROMETHAZINE HYDROCHLORIDE 25 MG/1
25 SUPPOSITORY RECTAL ONCE AS NEEDED
Status: DISCONTINUED | OUTPATIENT
Start: 2022-02-08 | End: 2022-02-08

## 2022-02-08 RX ORDER — NALOXONE HCL 0.4 MG/ML
0.2 VIAL (ML) INJECTION AS NEEDED
Status: DISCONTINUED | OUTPATIENT
Start: 2022-02-08 | End: 2022-02-08

## 2022-02-08 RX ORDER — HYDROCODONE BITARTRATE AND ACETAMINOPHEN 7.5; 325 MG/1; MG/1
1 TABLET ORAL ONCE AS NEEDED
Status: DISCONTINUED | OUTPATIENT
Start: 2022-02-08 | End: 2022-02-08

## 2022-02-08 RX ORDER — DIPHENHYDRAMINE HCL 25 MG
25 CAPSULE ORAL
Status: DISCONTINUED | OUTPATIENT
Start: 2022-02-08 | End: 2022-02-08

## 2022-02-08 RX ORDER — DIPHENHYDRAMINE HYDROCHLORIDE 50 MG/ML
12.5 INJECTION INTRAMUSCULAR; INTRAVENOUS
Status: DISCONTINUED | OUTPATIENT
Start: 2022-02-08 | End: 2022-02-08

## 2022-02-08 RX ORDER — PROMETHAZINE HYDROCHLORIDE 25 MG/1
25 TABLET ORAL ONCE AS NEEDED
Status: DISCONTINUED | OUTPATIENT
Start: 2022-02-08 | End: 2022-02-08

## 2022-02-08 RX ORDER — FENTANYL CITRATE 50 UG/ML
50 INJECTION, SOLUTION INTRAMUSCULAR; INTRAVENOUS
Status: DISCONTINUED | OUTPATIENT
Start: 2022-02-08 | End: 2022-02-08

## 2022-02-08 RX ORDER — LABETALOL HYDROCHLORIDE 5 MG/ML
5 INJECTION, SOLUTION INTRAVENOUS
Status: DISCONTINUED | OUTPATIENT
Start: 2022-02-08 | End: 2022-02-08

## 2022-02-08 RX ORDER — FLUMAZENIL 0.1 MG/ML
0.2 INJECTION INTRAVENOUS AS NEEDED
Status: DISCONTINUED | OUTPATIENT
Start: 2022-02-08 | End: 2022-02-08

## 2022-02-08 RX ORDER — HYDROMORPHONE HYDROCHLORIDE 1 MG/ML
0.5 INJECTION, SOLUTION INTRAMUSCULAR; INTRAVENOUS; SUBCUTANEOUS
Status: DISCONTINUED | OUTPATIENT
Start: 2022-02-08 | End: 2022-02-08

## 2022-02-08 RX ORDER — PANTOPRAZOLE SODIUM 40 MG/1
40 TABLET, DELAYED RELEASE ORAL
Status: DISCONTINUED | OUTPATIENT
Start: 2022-02-09 | End: 2022-02-25

## 2022-02-08 RX ADMIN — PROPOFOL 25 MCG/KG/MIN: 10 INJECTION, EMULSION INTRAVENOUS at 16:58

## 2022-02-08 RX ADMIN — Medication 25 MG: at 16:58

## 2022-02-08 RX ADMIN — SODIUM CHLORIDE 750 MG: 900 INJECTION, SOLUTION INTRAVENOUS at 02:00

## 2022-02-08 RX ADMIN — SODIUM CHLORIDE 250 MG: 9 INJECTION, SOLUTION INTRAVENOUS at 23:15

## 2022-02-08 RX ADMIN — IOPAMIDOL 50 ML: 510 INJECTION, SOLUTION INTRAVASCULAR at 17:42

## 2022-02-08 RX ADMIN — MIDODRINE HYDROCHLORIDE 10 MG: 5 TABLET ORAL at 07:56

## 2022-02-08 RX ADMIN — DEXAMETHASONE 6 MG: 6 TABLET ORAL at 07:56

## 2022-02-08 RX ADMIN — SODIUM CHLORIDE: 9 INJECTION, SOLUTION INTRAVENOUS at 17:00

## 2022-02-08 RX ADMIN — MIDODRINE HYDROCHLORIDE 10 MG: 5 TABLET ORAL at 10:53

## 2022-02-08 RX ADMIN — MIDODRINE HYDROCHLORIDE 10 MG: 5 TABLET ORAL at 17:58

## 2022-02-08 RX ADMIN — SODIUM CHLORIDE: 9 INJECTION, SOLUTION INTRAVENOUS at 16:50

## 2022-02-08 RX ADMIN — CEFAZOLIN SODIUM 2 G: 2 INJECTION, SOLUTION INTRAVENOUS at 17:04

## 2022-02-08 RX ADMIN — PHENYLEPHRINE HYDROCHLORIDE 100 MCG: 10 INJECTION, SOLUTION INTRAVENOUS at 17:26

## 2022-02-08 RX ADMIN — PANTOPRAZOLE SODIUM 40 MG: 40 INJECTION, POWDER, FOR SOLUTION INTRAVENOUS at 02:24

## 2022-02-08 RX ADMIN — CEFTRIAXONE 1 G: 1 INJECTION, POWDER, FOR SOLUTION INTRAMUSCULAR; INTRAVENOUS at 23:31

## 2022-02-08 RX ADMIN — PHENYLEPHRINE HYDROCHLORIDE 100 MCG: 10 INJECTION, SOLUTION INTRAVENOUS at 17:06

## 2022-02-08 RX ADMIN — PANTOPRAZOLE SODIUM 40 MG: 40 INJECTION, POWDER, FOR SOLUTION INTRAVENOUS at 07:15

## 2022-02-08 RX ADMIN — Medication 25 MG: at 17:08

## 2022-02-08 RX ADMIN — CEFTRIAXONE 1 G: 1 INJECTION, POWDER, FOR SOLUTION INTRAMUSCULAR; INTRAVENOUS at 01:00

## 2022-02-08 NOTE — ANESTHESIA POSTPROCEDURE EVALUATION
Patient: Jose Angel Us    Procedure Summary     Date: 02/08/22 [facility] / [facility]    Anesthesia Start: 1650 Anesthesia Stop: 1759    Procedure: VENA CAVA FILTER INSERTION (N/A Groin) Diagnosis:     Surgeons: Ligia Méndez MD Provider: Sumanth March MD    Anesthesia Type: MAC ASA Status: 4          Anesthesia Type: MAC    Vitals  Vitals Value Taken Time   BP     Temp     Pulse 75 02/08/22 1811   Resp     SpO2 91 % 02/08/22 1811   Vitals shown include unvalidated device data.        Post Anesthesia Care and Evaluation    Patient location during evaluation: ICU  Level of consciousness: awake and alert  Pain management: adequate  Airway patency: patent  Anesthetic complications: No anesthetic complications  PONV Status: none  Cardiovascular status: acceptable  Respiratory status: acceptable and nasal cannula  Hydration status: acceptable

## 2022-02-08 NOTE — ANESTHESIA PREPROCEDURE EVALUATION
Anesthesia Evaluation     Patient summary reviewed and Nursing notes reviewed                Airway   Mallampati: II  TM distance: >3 FB  Neck ROM: full  No difficulty expected  Dental - normal exam     Pulmonary    (+) pneumonia , decreased breath sounds,     ROS comment: Covid pneumonia/acute hypoxemic respiratory failure  PE comment: Pt coughing  Cardiovascular     ECG reviewed  Rhythm: regular  Rate: normal    (+) hypertension 2 medications or greater, dysrhythmias Paroxysmal Atrial Fib, PVD, DVT current, hyperlipidemia,     PE comment: NSR at present    Neuro/Psych  GI/Hepatic/Renal/Endo    (+)   renal disease,     Musculoskeletal     Abdominal  - normal exam   Substance History      OB/GYN          Other            Phys Exam Other: 4-lumen central line right chest              Anesthesia Plan    ASA 4     MAC   (Minimal Propofol MAC as needed/pt to go straight back to ICU from OR)  intravenous induction     Anesthetic plan, all risks, benefits, and alternatives have been provided, discussed and informed consent has been obtained with: patient.        CODE STATUS:    Code Status (Patient has no pulse and is not breathing): CPR (Attempt to Resuscitate)  Medical Interventions (Patient has pulse or is breathing): Full Support

## 2022-02-09 LAB
ALBUMIN SERPL-MCNC: 1.3 G/DL (ref 3.5–5.2)
ALP SERPL-CCNC: 105 U/L (ref 39–117)
ALT SERPL W P-5'-P-CCNC: 38 U/L (ref 1–41)
ANION GAP SERPL CALCULATED.3IONS-SCNC: 10.7 MMOL/L (ref 5–15)
AST SERPL-CCNC: 41 U/L (ref 1–40)
BILIRUB CONJ SERPL-MCNC: 0.2 MG/DL (ref 0–0.3)
BILIRUB INDIRECT SERPL-MCNC: 0.1 MG/DL
BILIRUB SERPL-MCNC: 0.3 MG/DL (ref 0–1.2)
BUN SERPL-MCNC: 41 MG/DL (ref 8–23)
BUN/CREAT SERPL: 24 (ref 7–25)
C3 SERPL-MCNC: 67 MG/DL (ref 82–167)
C4 SERPL-MCNC: 8 MG/DL (ref 12–38)
CALCIUM SPEC-SCNC: 7.4 MG/DL (ref 8.6–10.5)
CHLORIDE SERPL-SCNC: 103 MMOL/L (ref 98–107)
CO2 SERPL-SCNC: 25.3 MMOL/L (ref 22–29)
CREAT SERPL-MCNC: 1.71 MG/DL (ref 0.76–1.27)
FERRITIN SERPL-MCNC: 658 NG/ML (ref 30–400)
GFR SERPL CREATININE-BSD FRML MDRD: 39 ML/MIN/1.73
GLUCOSE SERPL-MCNC: 76 MG/DL (ref 65–99)
HAPTOGLOB SERPL-MCNC: 195 MG/DL (ref 30–200)
HCT VFR BLD AUTO: 29 % (ref 37.5–51)
HGB BLD-MCNC: 9.4 G/DL (ref 13–17.7)
PF4 HEPARIN CMPLX IGG SERPL IA: 0.07 OD (ref 0–0.4)
POTASSIUM SERPL-SCNC: 4 MMOL/L (ref 3.5–5.2)
PROT SERPL-MCNC: 4.4 G/DL (ref 6–8.5)
SODIUM SERPL-SCNC: 139 MMOL/L (ref 136–145)
VANCOMYCIN TROUGH SERPL-MCNC: 13.2 MCG/ML (ref 5–20)

## 2022-02-09 PROCEDURE — 94761 N-INVAS EAR/PLS OXIMETRY MLT: CPT

## 2022-02-09 PROCEDURE — 85018 HEMOGLOBIN: CPT | Performed by: STUDENT IN AN ORGANIZED HEALTH CARE EDUCATION/TRAINING PROGRAM

## 2022-02-09 PROCEDURE — 92610 EVALUATE SWALLOWING FUNCTION: CPT

## 2022-02-09 PROCEDURE — 85014 HEMATOCRIT: CPT | Performed by: STUDENT IN AN ORGANIZED HEALTH CARE EDUCATION/TRAINING PROGRAM

## 2022-02-09 PROCEDURE — 99233 SBSQ HOSP IP/OBS HIGH 50: CPT | Performed by: INTERNAL MEDICINE

## 2022-02-09 PROCEDURE — 83010 ASSAY OF HAPTOGLOBIN QUANT: CPT | Performed by: STUDENT IN AN ORGANIZED HEALTH CARE EDUCATION/TRAINING PROGRAM

## 2022-02-09 PROCEDURE — 80202 ASSAY OF VANCOMYCIN: CPT | Performed by: STUDENT IN AN ORGANIZED HEALTH CARE EDUCATION/TRAINING PROGRAM

## 2022-02-09 PROCEDURE — 82728 ASSAY OF FERRITIN: CPT | Performed by: STUDENT IN AN ORGANIZED HEALTH CARE EDUCATION/TRAINING PROGRAM

## 2022-02-09 PROCEDURE — 97110 THERAPEUTIC EXERCISES: CPT

## 2022-02-09 PROCEDURE — 99231 SBSQ HOSP IP/OBS SF/LOW 25: CPT | Performed by: INTERNAL MEDICINE

## 2022-02-09 PROCEDURE — 25010000002 NA FERRIC GLUC CPLX PER 12.5 MG: Performed by: INTERNAL MEDICINE

## 2022-02-09 PROCEDURE — 63710000001 DEXAMETHASONE PER 0.25 MG: Performed by: STUDENT IN AN ORGANIZED HEALTH CARE EDUCATION/TRAINING PROGRAM

## 2022-02-09 PROCEDURE — 97530 THERAPEUTIC ACTIVITIES: CPT

## 2022-02-09 PROCEDURE — 80076 HEPATIC FUNCTION PANEL: CPT | Performed by: INTERNAL MEDICINE

## 2022-02-09 PROCEDURE — 80048 BASIC METABOLIC PNL TOTAL CA: CPT | Performed by: STUDENT IN AN ORGANIZED HEALTH CARE EDUCATION/TRAINING PROGRAM

## 2022-02-09 PROCEDURE — 94799 UNLISTED PULMONARY SVC/PX: CPT

## 2022-02-09 PROCEDURE — 99232 SBSQ HOSP IP/OBS MODERATE 35: CPT | Performed by: INTERNAL MEDICINE

## 2022-02-09 RX ADMIN — MIDODRINE HYDROCHLORIDE 10 MG: 5 TABLET ORAL at 18:12

## 2022-02-09 RX ADMIN — MIDODRINE HYDROCHLORIDE 10 MG: 5 TABLET ORAL at 12:32

## 2022-02-09 RX ADMIN — MIDODRINE HYDROCHLORIDE 10 MG: 5 TABLET ORAL at 06:44

## 2022-02-09 RX ADMIN — PANTOPRAZOLE SODIUM 40 MG: 40 TABLET, DELAYED RELEASE ORAL at 06:44

## 2022-02-09 RX ADMIN — DOCUSATE SODIUM 50MG AND SENNOSIDES 8.6MG 2 TABLET: 8.6; 5 TABLET, FILM COATED ORAL at 20:20

## 2022-02-09 RX ADMIN — DOCUSATE SODIUM 50MG AND SENNOSIDES 8.6MG 2 TABLET: 8.6; 5 TABLET, FILM COATED ORAL at 08:22

## 2022-02-09 RX ADMIN — DEXAMETHASONE 6 MG: 6 TABLET ORAL at 08:22

## 2022-02-09 RX ADMIN — SODIUM CHLORIDE 250 MG: 9 INJECTION, SOLUTION INTRAVENOUS at 20:20

## 2022-02-10 LAB
ALBUMIN SERPL-MCNC: 1.9 G/DL (ref 3.5–5.2)
ALBUMIN/GLOB SERPL: 0.8 G/DL
ALP SERPL-CCNC: 115 U/L (ref 39–117)
ALT SERPL W P-5'-P-CCNC: 29 U/L (ref 1–41)
ANION GAP SERPL CALCULATED.3IONS-SCNC: 10 MMOL/L (ref 5–15)
APTT PPP: 30.3 SECONDS (ref 22.7–35.4)
AST SERPL-CCNC: 24 U/L (ref 1–40)
BASOPHILS # BLD AUTO: 0.03 10*3/MM3 (ref 0–0.2)
BASOPHILS NFR BLD AUTO: 0.1 % (ref 0–1.5)
BILIRUB SERPL-MCNC: 0.3 MG/DL (ref 0–1.2)
BUN SERPL-MCNC: 41 MG/DL (ref 8–23)
BUN/CREAT SERPL: 27.9 (ref 7–25)
CALCIUM SPEC-SCNC: 7.4 MG/DL (ref 8.6–10.5)
CHLORIDE SERPL-SCNC: 103 MMOL/L (ref 98–107)
CO2 SERPL-SCNC: 26 MMOL/L (ref 22–29)
CREAT SERPL-MCNC: 1.47 MG/DL (ref 0.76–1.27)
DEPRECATED RDW RBC AUTO: 45.3 FL (ref 37–54)
EOSINOPHIL # BLD AUTO: 0 10*3/MM3 (ref 0–0.4)
EOSINOPHIL NFR BLD AUTO: 0 % (ref 0.3–6.2)
ERYTHROCYTE [DISTWIDTH] IN BLOOD BY AUTOMATED COUNT: 13.7 % (ref 12.3–15.4)
FERRITIN SERPL-MCNC: 1204 NG/ML (ref 30–400)
GFR SERPL CREATININE-BSD FRML MDRD: 47 ML/MIN/1.73
GLOBULIN UR ELPH-MCNC: 2.3 GM/DL
GLUCOSE SERPL-MCNC: 120 MG/DL (ref 65–99)
HAPTOGLOB SERPL-MCNC: 187 MG/DL (ref 30–200)
HCT VFR BLD AUTO: 27.9 % (ref 37.5–51)
HGB BLD-MCNC: 9.2 G/DL (ref 13–17.7)
IMM GRANULOCYTES # BLD AUTO: 0.17 10*3/MM3 (ref 0–0.05)
IMM GRANULOCYTES NFR BLD AUTO: 0.7 % (ref 0–0.5)
INR PPP: 1.09 (ref 0.9–1.1)
LYMPHOCYTES # BLD AUTO: 0.46 10*3/MM3 (ref 0.7–3.1)
LYMPHOCYTES NFR BLD AUTO: 1.8 % (ref 19.6–45.3)
MCH RBC QN AUTO: 30.2 PG (ref 26.6–33)
MCHC RBC AUTO-ENTMCNC: 33 G/DL (ref 31.5–35.7)
MCV RBC AUTO: 91.5 FL (ref 79–97)
MONOCYTES # BLD AUTO: 0.68 10*3/MM3 (ref 0.1–0.9)
MONOCYTES NFR BLD AUTO: 2.7 % (ref 5–12)
NEUTROPHILS NFR BLD AUTO: 23.88 10*3/MM3 (ref 1.7–7)
NEUTROPHILS NFR BLD AUTO: 94.7 % (ref 42.7–76)
NRBC BLD AUTO-RTO: 0.2 /100 WBC (ref 0–0.2)
PLATELET # BLD AUTO: 139 10*3/MM3 (ref 140–450)
PMV BLD AUTO: 10.9 FL (ref 6–12)
POTASSIUM SERPL-SCNC: 4 MMOL/L (ref 3.5–5.2)
PROT SERPL-MCNC: 4.2 G/DL (ref 6–8.5)
PROTHROMBIN TIME: 14 SECONDS (ref 11.7–14.2)
RBC # BLD AUTO: 3.05 10*6/MM3 (ref 4.14–5.8)
SODIUM SERPL-SCNC: 139 MMOL/L (ref 136–145)
WBC NRBC COR # BLD: 25.22 10*3/MM3 (ref 3.4–10.8)

## 2022-02-10 PROCEDURE — 85025 COMPLETE CBC W/AUTO DIFF WBC: CPT | Performed by: INTERNAL MEDICINE

## 2022-02-10 PROCEDURE — 63710000001 DEXAMETHASONE PER 0.25 MG: Performed by: STUDENT IN AN ORGANIZED HEALTH CARE EDUCATION/TRAINING PROGRAM

## 2022-02-10 PROCEDURE — 25010000002 HEPARIN (PORCINE) 25000-0.45 UT/250ML-% SOLUTION: Performed by: STUDENT IN AN ORGANIZED HEALTH CARE EDUCATION/TRAINING PROGRAM

## 2022-02-10 PROCEDURE — 97110 THERAPEUTIC EXERCISES: CPT

## 2022-02-10 PROCEDURE — 83010 ASSAY OF HAPTOGLOBIN QUANT: CPT | Performed by: STUDENT IN AN ORGANIZED HEALTH CARE EDUCATION/TRAINING PROGRAM

## 2022-02-10 PROCEDURE — 85610 PROTHROMBIN TIME: CPT | Performed by: STUDENT IN AN ORGANIZED HEALTH CARE EDUCATION/TRAINING PROGRAM

## 2022-02-10 PROCEDURE — 82728 ASSAY OF FERRITIN: CPT | Performed by: STUDENT IN AN ORGANIZED HEALTH CARE EDUCATION/TRAINING PROGRAM

## 2022-02-10 PROCEDURE — 25010000002 CEFTRIAXONE PER 250 MG: Performed by: STUDENT IN AN ORGANIZED HEALTH CARE EDUCATION/TRAINING PROGRAM

## 2022-02-10 PROCEDURE — 97535 SELF CARE MNGMENT TRAINING: CPT

## 2022-02-10 PROCEDURE — 80053 COMPREHEN METABOLIC PANEL: CPT | Performed by: INTERNAL MEDICINE

## 2022-02-10 PROCEDURE — 25010000002 NA FERRIC GLUC CPLX PER 12.5 MG: Performed by: INTERNAL MEDICINE

## 2022-02-10 PROCEDURE — 85730 THROMBOPLASTIN TIME PARTIAL: CPT | Performed by: STUDENT IN AN ORGANIZED HEALTH CARE EDUCATION/TRAINING PROGRAM

## 2022-02-10 PROCEDURE — 25010000002 HEPARIN (PORCINE) 25000-0.45 UT/250ML-% SOLUTION: Performed by: SURGERY

## 2022-02-10 PROCEDURE — 99233 SBSQ HOSP IP/OBS HIGH 50: CPT | Performed by: INTERNAL MEDICINE

## 2022-02-10 RX ORDER — HEPARIN SODIUM 10000 [USP'U]/100ML
8 INJECTION, SOLUTION INTRAVENOUS CONTINUOUS
Status: DISCONTINUED | OUTPATIENT
Start: 2022-02-10 | End: 2022-02-11

## 2022-02-10 RX ORDER — HYDROCODONE BITARTRATE AND ACETAMINOPHEN 5; 325 MG/1; MG/1
1 TABLET ORAL EVERY 6 HOURS PRN
Status: DISPENSED | OUTPATIENT
Start: 2022-02-10 | End: 2022-02-17

## 2022-02-10 RX ORDER — HEPARIN SODIUM 10000 [USP'U]/100ML
8 INJECTION, SOLUTION INTRAVENOUS
Status: DISCONTINUED | OUTPATIENT
Start: 2022-02-10 | End: 2022-02-10

## 2022-02-10 RX ADMIN — PANTOPRAZOLE SODIUM 40 MG: 40 TABLET, DELAYED RELEASE ORAL at 06:34

## 2022-02-10 RX ADMIN — SODIUM CHLORIDE 250 MG: 9 INJECTION, SOLUTION INTRAVENOUS at 14:34

## 2022-02-10 RX ADMIN — HYDROCODONE BITARTRATE AND ACETAMINOPHEN 1 TABLET: 5; 325 TABLET ORAL at 22:42

## 2022-02-10 RX ADMIN — MIDODRINE HYDROCHLORIDE 10 MG: 5 TABLET ORAL at 06:33

## 2022-02-10 RX ADMIN — CEFTRIAXONE 1 G: 1 INJECTION, POWDER, FOR SOLUTION INTRAMUSCULAR; INTRAVENOUS at 00:09

## 2022-02-10 RX ADMIN — HEPARIN SODIUM 8 UNITS/KG/HR: 10000 INJECTION, SOLUTION INTRAVENOUS at 20:59

## 2022-02-10 RX ADMIN — MIDODRINE HYDROCHLORIDE 10 MG: 5 TABLET ORAL at 12:45

## 2022-02-10 RX ADMIN — MIDODRINE HYDROCHLORIDE 10 MG: 5 TABLET ORAL at 17:04

## 2022-02-10 RX ADMIN — HEPARIN SODIUM 8 UNITS/KG/HR: 10000 INJECTION, SOLUTION INTRAVENOUS at 14:34

## 2022-02-10 RX ADMIN — DEXAMETHASONE 6 MG: 6 TABLET ORAL at 08:17

## 2022-02-11 LAB
ANION GAP SERPL CALCULATED.3IONS-SCNC: 8.6 MMOL/L (ref 5–15)
APTT PPP: 38.7 SECONDS (ref 22.7–35.4)
APTT PPP: 45.1 SECONDS (ref 22.7–35.4)
APTT PPP: 46.3 SECONDS (ref 22.7–35.4)
APTT PPP: 61 SECONDS (ref 22.7–35.4)
BASOPHILS # BLD AUTO: 0.03 10*3/MM3 (ref 0–0.2)
BASOPHILS # BLD AUTO: 0.04 10*3/MM3 (ref 0–0.2)
BASOPHILS NFR BLD AUTO: 0.2 % (ref 0–1.5)
BASOPHILS NFR BLD AUTO: 0.2 % (ref 0–1.5)
BUN SERPL-MCNC: 35 MG/DL (ref 8–23)
BUN/CREAT SERPL: 26.5 (ref 7–25)
CALCIUM SPEC-SCNC: 7.9 MG/DL (ref 8.6–10.5)
CHLORIDE SERPL-SCNC: 104 MMOL/L (ref 98–107)
CO2 SERPL-SCNC: 25.4 MMOL/L (ref 22–29)
CREAT SERPL-MCNC: 1.32 MG/DL (ref 0.76–1.27)
DEPRECATED RDW RBC AUTO: 41.8 FL (ref 37–54)
DEPRECATED RDW RBC AUTO: 44.2 FL (ref 37–54)
EOSINOPHIL # BLD AUTO: 0 10*3/MM3 (ref 0–0.4)
EOSINOPHIL # BLD AUTO: 0.03 10*3/MM3 (ref 0–0.4)
EOSINOPHIL NFR BLD AUTO: 0 % (ref 0.3–6.2)
EOSINOPHIL NFR BLD AUTO: 0.2 % (ref 0.3–6.2)
ERYTHROCYTE [DISTWIDTH] IN BLOOD BY AUTOMATED COUNT: 13 % (ref 12.3–15.4)
ERYTHROCYTE [DISTWIDTH] IN BLOOD BY AUTOMATED COUNT: 13.4 % (ref 12.3–15.4)
FERRITIN SERPL-MCNC: 1866 NG/ML (ref 30–400)
GFR SERPL CREATININE-BSD FRML MDRD: 53 ML/MIN/1.73
GLUCOSE SERPL-MCNC: 102 MG/DL (ref 65–99)
HAPTOGLOB SERPL-MCNC: 189 MG/DL (ref 30–200)
HCT VFR BLD AUTO: 30.7 % (ref 37.5–51)
HCT VFR BLD AUTO: 31.7 % (ref 37.5–51)
HGB BLD-MCNC: 10.1 G/DL (ref 13–17.7)
HGB BLD-MCNC: 10.6 G/DL (ref 13–17.7)
IMM GRANULOCYTES # BLD AUTO: 0.15 10*3/MM3 (ref 0–0.05)
IMM GRANULOCYTES NFR BLD AUTO: 0.9 % (ref 0–0.5)
INR PPP: 1.06 (ref 0.9–1.1)
LYMPHOCYTES # BLD AUTO: 0.25 10*3/MM3 (ref 0.7–3.1)
LYMPHOCYTES # BLD AUTO: 0.49 10*3/MM3 (ref 0.7–3.1)
LYMPHOCYTES NFR BLD AUTO: 1.5 % (ref 19.6–45.3)
LYMPHOCYTES NFR BLD AUTO: 2.7 % (ref 19.6–45.3)
MCH RBC QN AUTO: 30.1 PG (ref 26.6–33)
MCH RBC QN AUTO: 30.2 PG (ref 26.6–33)
MCHC RBC AUTO-ENTMCNC: 31.9 G/DL (ref 31.5–35.7)
MCHC RBC AUTO-ENTMCNC: 34.5 G/DL (ref 31.5–35.7)
MCV RBC AUTO: 87.5 FL (ref 79–97)
MCV RBC AUTO: 94.6 FL (ref 79–97)
MONOCYTES # BLD AUTO: 0.62 10*3/MM3 (ref 0.1–0.9)
MONOCYTES # BLD AUTO: 1 10*3/MM3 (ref 0.1–0.9)
MONOCYTES NFR BLD AUTO: 3.7 % (ref 5–12)
MONOCYTES NFR BLD AUTO: 5.5 % (ref 5–12)
NEUTROPHILS NFR BLD AUTO: 15.78 10*3/MM3 (ref 1.7–7)
NEUTROPHILS NFR BLD AUTO: 16.41 10*3/MM3 (ref 1.7–7)
NEUTROPHILS NFR BLD AUTO: 90.6 % (ref 42.7–76)
NEUTROPHILS NFR BLD AUTO: 93.7 % (ref 42.7–76)
NRBC BLD AUTO-RTO: 0.2 /100 WBC (ref 0–0.2)
PLATELET # BLD AUTO: 126 10*3/MM3 (ref 140–450)
PLATELET # BLD AUTO: 131 10*3/MM3 (ref 140–450)
PMV BLD AUTO: 10.7 FL (ref 6–12)
PMV BLD AUTO: 10.9 FL (ref 6–12)
POTASSIUM SERPL-SCNC: 4.2 MMOL/L (ref 3.5–5.2)
PROTHROMBIN TIME: 13.8 SECONDS (ref 11.7–14.2)
RBC # BLD AUTO: 3.35 10*6/MM3 (ref 4.14–5.8)
RBC # BLD AUTO: 3.51 10*6/MM3 (ref 4.14–5.8)
SODIUM SERPL-SCNC: 138 MMOL/L (ref 136–145)
WBC NRBC COR # BLD: 16.84 10*3/MM3 (ref 3.4–10.8)
WBC NRBC COR # BLD: 18.1 10*3/MM3 (ref 3.4–10.8)

## 2022-02-11 PROCEDURE — 83010 ASSAY OF HAPTOGLOBIN QUANT: CPT | Performed by: STUDENT IN AN ORGANIZED HEALTH CARE EDUCATION/TRAINING PROGRAM

## 2022-02-11 PROCEDURE — 82728 ASSAY OF FERRITIN: CPT | Performed by: STUDENT IN AN ORGANIZED HEALTH CARE EDUCATION/TRAINING PROGRAM

## 2022-02-11 PROCEDURE — 85025 COMPLETE CBC W/AUTO DIFF WBC: CPT | Performed by: INTERNAL MEDICINE

## 2022-02-11 PROCEDURE — 85730 THROMBOPLASTIN TIME PARTIAL: CPT | Performed by: SURGERY

## 2022-02-11 PROCEDURE — 97530 THERAPEUTIC ACTIVITIES: CPT

## 2022-02-11 PROCEDURE — 85610 PROTHROMBIN TIME: CPT | Performed by: INTERNAL MEDICINE

## 2022-02-11 PROCEDURE — 25010000002 HEPARIN (PORCINE) 25000-0.45 UT/250ML-% SOLUTION: Performed by: INTERNAL MEDICINE

## 2022-02-11 PROCEDURE — 25010000002 CEFTRIAXONE PER 250 MG: Performed by: STUDENT IN AN ORGANIZED HEALTH CARE EDUCATION/TRAINING PROGRAM

## 2022-02-11 PROCEDURE — 80048 BASIC METABOLIC PNL TOTAL CA: CPT | Performed by: STUDENT IN AN ORGANIZED HEALTH CARE EDUCATION/TRAINING PROGRAM

## 2022-02-11 PROCEDURE — 99233 SBSQ HOSP IP/OBS HIGH 50: CPT | Performed by: INTERNAL MEDICINE

## 2022-02-11 RX ORDER — HEPARIN SODIUM 10000 [USP'U]/100ML
18 INJECTION, SOLUTION INTRAVENOUS
Status: DISCONTINUED | OUTPATIENT
Start: 2022-02-11 | End: 2022-02-13

## 2022-02-11 RX ORDER — TERAZOSIN 1 MG/1
1 CAPSULE ORAL NIGHTLY
Status: DISCONTINUED | OUTPATIENT
Start: 2022-02-11 | End: 2022-02-13

## 2022-02-11 RX ORDER — CHLORTHALIDONE 25 MG/1
25 TABLET ORAL DAILY
Status: DISCONTINUED | OUTPATIENT
Start: 2022-02-12 | End: 2022-02-13

## 2022-02-11 RX ORDER — TAMSULOSIN HYDROCHLORIDE 0.4 MG/1
0.4 CAPSULE ORAL NIGHTLY
Status: DISCONTINUED | OUTPATIENT
Start: 2022-02-11 | End: 2022-02-11

## 2022-02-11 RX ADMIN — PANTOPRAZOLE SODIUM 40 MG: 40 TABLET, DELAYED RELEASE ORAL at 06:42

## 2022-02-11 RX ADMIN — DEXAMETHASONE 6 MG: 6 TABLET ORAL at 09:00

## 2022-02-11 RX ADMIN — COLLAGENASE SANTYL 1 APPLICATION: 250 OINTMENT TOPICAL at 20:13

## 2022-02-11 RX ADMIN — TERAZOSIN 1 MG: 1 CAPSULE ORAL at 20:12

## 2022-02-11 RX ADMIN — MIDODRINE HYDROCHLORIDE 10 MG: 5 TABLET ORAL at 11:20

## 2022-02-11 RX ADMIN — COLLAGENASE SANTYL 1 APPLICATION: 250 OINTMENT TOPICAL at 17:08

## 2022-02-11 RX ADMIN — HEPARIN SODIUM 18 UNITS/KG/HR: 10000 INJECTION, SOLUTION INTRAVENOUS at 17:08

## 2022-02-11 RX ADMIN — CEFTRIAXONE 1 G: 1 INJECTION, POWDER, FOR SOLUTION INTRAMUSCULAR; INTRAVENOUS at 00:11

## 2022-02-11 RX ADMIN — HYDROCODONE BITARTRATE AND ACETAMINOPHEN 1 TABLET: 5; 325 TABLET ORAL at 03:54

## 2022-02-12 LAB
ALBUMIN SERPL-MCNC: 1.9 G/DL (ref 3.5–5.2)
ANION GAP SERPL CALCULATED.3IONS-SCNC: 11 MMOL/L (ref 5–15)
ANISOCYTOSIS BLD QL: ABNORMAL
APTT PPP: 102 SECONDS (ref 22.7–35.4)
APTT PPP: >200 SECONDS (ref 22.7–35.4)
BASO STIPL COARSE BLD QL SMEAR: ABNORMAL
BUN SERPL-MCNC: 34 MG/DL (ref 8–23)
BUN/CREAT SERPL: 27.9 (ref 7–25)
CALCIUM SPEC-SCNC: 7.3 MG/DL (ref 8.6–10.5)
CHLORIDE SERPL-SCNC: 103 MMOL/L (ref 98–107)
CO2 SERPL-SCNC: 22 MMOL/L (ref 22–29)
CREAT SERPL-MCNC: 1.22 MG/DL (ref 0.76–1.27)
DEPRECATED RDW RBC AUTO: 42.7 FL (ref 37–54)
ERYTHROCYTE [DISTWIDTH] IN BLOOD BY AUTOMATED COUNT: 13.5 % (ref 12.3–15.4)
GFR SERPL CREATININE-BSD FRML MDRD: 58 ML/MIN/1.73
GLUCOSE SERPL-MCNC: 102 MG/DL (ref 65–99)
HCT VFR BLD AUTO: 23.9 % (ref 37.5–51)
HCT VFR BLD AUTO: 24.5 % (ref 37.5–51)
HGB BLD-MCNC: 8 G/DL (ref 13–17.7)
HGB BLD-MCNC: 8.4 G/DL (ref 13–17.7)
LYMPHOCYTES # BLD MANUAL: 0.12 10*3/MM3 (ref 0.7–3.1)
LYMPHOCYTES NFR BLD MANUAL: 4 % (ref 5–12)
MAGNESIUM SERPL-MCNC: 1.7 MG/DL (ref 1.6–2.4)
MCH RBC QN AUTO: 29.7 PG (ref 26.6–33)
MCHC RBC AUTO-ENTMCNC: 33.5 G/DL (ref 31.5–35.7)
MCV RBC AUTO: 88.8 FL (ref 79–97)
MICROCYTES BLD QL: ABNORMAL
MONOCYTES # BLD: 0.48 10*3/MM3 (ref 0.1–0.9)
NEUTROPHILS # BLD AUTO: 11.34 10*3/MM3 (ref 1.7–7)
NEUTROPHILS NFR BLD MANUAL: 93.9 % (ref 42.7–76)
PHOSPHATE SERPL-MCNC: 2.4 MG/DL (ref 2.5–4.5)
PLAT MORPH BLD: NORMAL
PLATELET # BLD AUTO: 108 10*3/MM3 (ref 140–450)
PMV BLD AUTO: 10.9 FL (ref 6–12)
POLYCHROMASIA BLD QL SMEAR: ABNORMAL
POTASSIUM SERPL-SCNC: 4.5 MMOL/L (ref 3.5–5.2)
PROMYELOCYTES NFR BLD MANUAL: 1 % (ref 0–0)
RBC # BLD AUTO: 2.69 10*6/MM3 (ref 4.14–5.8)
SODIUM SERPL-SCNC: 136 MMOL/L (ref 136–145)
VARIANT LYMPHS NFR BLD MANUAL: 1 % (ref 19.6–45.3)
WBC MORPH BLD: NORMAL
WBC NRBC COR # BLD: 12.08 10*3/MM3 (ref 3.4–10.8)

## 2022-02-12 PROCEDURE — 25010000002 HEPARIN (PORCINE) 25000-0.45 UT/250ML-% SOLUTION: Performed by: INTERNAL MEDICINE

## 2022-02-12 PROCEDURE — 85025 COMPLETE CBC W/AUTO DIFF WBC: CPT | Performed by: INTERNAL MEDICINE

## 2022-02-12 PROCEDURE — 85018 HEMOGLOBIN: CPT | Performed by: INTERNAL MEDICINE

## 2022-02-12 PROCEDURE — 85014 HEMATOCRIT: CPT | Performed by: INTERNAL MEDICINE

## 2022-02-12 PROCEDURE — 83735 ASSAY OF MAGNESIUM: CPT | Performed by: INTERNAL MEDICINE

## 2022-02-12 PROCEDURE — 85007 BL SMEAR W/DIFF WBC COUNT: CPT | Performed by: INTERNAL MEDICINE

## 2022-02-12 PROCEDURE — 80069 RENAL FUNCTION PANEL: CPT | Performed by: INTERNAL MEDICINE

## 2022-02-12 PROCEDURE — 85730 THROMBOPLASTIN TIME PARTIAL: CPT | Performed by: INTERNAL MEDICINE

## 2022-02-12 RX ADMIN — COLLAGENASE SANTYL 1 APPLICATION: 250 OINTMENT TOPICAL at 20:30

## 2022-02-12 RX ADMIN — PANTOPRAZOLE SODIUM 40 MG: 40 TABLET, DELAYED RELEASE ORAL at 06:17

## 2022-02-12 RX ADMIN — COLLAGENASE SANTYL 1 APPLICATION: 250 OINTMENT TOPICAL at 08:56

## 2022-02-12 RX ADMIN — HEPARIN SODIUM 19 UNITS/KG/HR: 10000 INJECTION, SOLUTION INTRAVENOUS at 08:58

## 2022-02-12 RX ADMIN — CHLORTHALIDONE 25 MG: 25 TABLET ORAL at 08:56

## 2022-02-12 RX ADMIN — HEPARIN SODIUM 17 UNITS/KG/HR: 10000 INJECTION, SOLUTION INTRAVENOUS at 18:54

## 2022-02-13 ENCOUNTER — APPOINTMENT (OUTPATIENT)
Dept: CT IMAGING | Facility: HOSPITAL | Age: 72
End: 2022-02-13

## 2022-02-13 ENCOUNTER — APPOINTMENT (OUTPATIENT)
Dept: GENERAL RADIOLOGY | Facility: HOSPITAL | Age: 72
End: 2022-02-13

## 2022-02-13 LAB
ABO GROUP BLD: NORMAL
ALBUMIN SERPL-MCNC: 1.9 G/DL (ref 3.5–5.2)
ANION GAP SERPL CALCULATED.3IONS-SCNC: 10 MMOL/L (ref 5–15)
APTT PPP: 27.2 SECONDS (ref 22.7–35.4)
BASOPHILS # BLD AUTO: 0.03 10*3/MM3 (ref 0–0.2)
BASOPHILS NFR BLD AUTO: 0.1 % (ref 0–1.5)
BLD GP AB SCN SERPL QL: NEGATIVE
BUN SERPL-MCNC: 35 MG/DL (ref 8–23)
BUN/CREAT SERPL: 27.1 (ref 7–25)
CALCIUM SPEC-SCNC: 7.2 MG/DL (ref 8.6–10.5)
CHLORIDE SERPL-SCNC: 103 MMOL/L (ref 98–107)
CO2 SERPL-SCNC: 22 MMOL/L (ref 22–29)
CREAT SERPL-MCNC: 1.29 MG/DL (ref 0.76–1.27)
D-LACTATE SERPL-SCNC: 2.8 MMOL/L (ref 0.5–2)
DEPRECATED RDW RBC AUTO: 45.4 FL (ref 37–54)
EOSINOPHIL # BLD AUTO: 0.01 10*3/MM3 (ref 0–0.4)
EOSINOPHIL NFR BLD AUTO: 0 % (ref 0.3–6.2)
ERYTHROCYTE [DISTWIDTH] IN BLOOD BY AUTOMATED COUNT: 13.9 % (ref 12.3–15.4)
GFR SERPL CREATININE-BSD FRML MDRD: 55 ML/MIN/1.73
GLUCOSE BLDC GLUCOMTR-MCNC: 83 MG/DL (ref 70–130)
GLUCOSE BLDC GLUCOMTR-MCNC: 84 MG/DL (ref 70–130)
GLUCOSE BLDC GLUCOMTR-MCNC: 88 MG/DL (ref 70–130)
GLUCOSE BLDC GLUCOMTR-MCNC: 91 MG/DL (ref 70–130)
GLUCOSE SERPL-MCNC: 75 MG/DL (ref 65–99)
HCT VFR BLD AUTO: 18.3 % (ref 37.5–51)
HCT VFR BLD AUTO: 20.1 % (ref 37.5–51)
HCT VFR BLD AUTO: 22.2 % (ref 37.5–51)
HGB BLD-MCNC: 6.1 G/DL (ref 13–17.7)
HGB BLD-MCNC: 6.8 G/DL (ref 13–17.7)
HGB BLD-MCNC: 7.3 G/DL (ref 13–17.7)
IMM GRANULOCYTES # BLD AUTO: 0.59 10*3/MM3 (ref 0–0.05)
IMM GRANULOCYTES NFR BLD AUTO: 2.6 % (ref 0–0.5)
LYMPHOCYTES # BLD AUTO: 1.22 10*3/MM3 (ref 0.7–3.1)
LYMPHOCYTES NFR BLD AUTO: 5.3 % (ref 19.6–45.3)
MCH RBC QN AUTO: 30 PG (ref 26.6–33)
MCHC RBC AUTO-ENTMCNC: 32.9 G/DL (ref 31.5–35.7)
MCV RBC AUTO: 91.4 FL (ref 79–97)
MONOCYTES # BLD AUTO: 0.99 10*3/MM3 (ref 0.1–0.9)
MONOCYTES NFR BLD AUTO: 4.3 % (ref 5–12)
NEUTROPHILS NFR BLD AUTO: 19.98 10*3/MM3 (ref 1.7–7)
NEUTROPHILS NFR BLD AUTO: 87.7 % (ref 42.7–76)
NRBC BLD AUTO-RTO: 0.1 /100 WBC (ref 0–0.2)
PHOSPHATE SERPL-MCNC: 3.1 MG/DL (ref 2.5–4.5)
PLATELET # BLD AUTO: 129 10*3/MM3 (ref 140–450)
PMV BLD AUTO: 11.3 FL (ref 6–12)
POTASSIUM SERPL-SCNC: 4.7 MMOL/L (ref 3.5–5.2)
PROCALCITONIN SERPL-MCNC: 0.44 NG/ML (ref 0–0.25)
RBC # BLD AUTO: 2.43 10*6/MM3 (ref 4.14–5.8)
RH BLD: POSITIVE
SODIUM SERPL-SCNC: 135 MMOL/L (ref 136–145)
T&S EXPIRATION DATE: NORMAL
WBC NRBC COR # BLD: 22.82 10*3/MM3 (ref 3.4–10.8)

## 2022-02-13 PROCEDURE — 25010000002 ONDANSETRON PER 1 MG: Performed by: STUDENT IN AN ORGANIZED HEALTH CARE EDUCATION/TRAINING PROGRAM

## 2022-02-13 PROCEDURE — 87040 BLOOD CULTURE FOR BACTERIA: CPT | Performed by: NURSE PRACTITIONER

## 2022-02-13 PROCEDURE — 86900 BLOOD TYPING SEROLOGIC ABO: CPT

## 2022-02-13 PROCEDURE — 94799 UNLISTED PULMONARY SVC/PX: CPT

## 2022-02-13 PROCEDURE — 83605 ASSAY OF LACTIC ACID: CPT | Performed by: NURSE PRACTITIONER

## 2022-02-13 PROCEDURE — 74176 CT ABD & PELVIS W/O CONTRAST: CPT

## 2022-02-13 PROCEDURE — 36430 TRANSFUSION BLD/BLD COMPNT: CPT

## 2022-02-13 PROCEDURE — 86901 BLOOD TYPING SEROLOGIC RH(D): CPT | Performed by: INTERNAL MEDICINE

## 2022-02-13 PROCEDURE — 86923 COMPATIBILITY TEST ELECTRIC: CPT

## 2022-02-13 PROCEDURE — 86900 BLOOD TYPING SEROLOGIC ABO: CPT | Performed by: INTERNAL MEDICINE

## 2022-02-13 PROCEDURE — P9016 RBC LEUKOCYTES REDUCED: HCPCS

## 2022-02-13 PROCEDURE — 85014 HEMATOCRIT: CPT | Performed by: INTERNAL MEDICINE

## 2022-02-13 PROCEDURE — 86850 RBC ANTIBODY SCREEN: CPT | Performed by: INTERNAL MEDICINE

## 2022-02-13 PROCEDURE — C1751 CATH, INF, PER/CENT/MIDLINE: HCPCS

## 2022-02-13 PROCEDURE — 99221 1ST HOSP IP/OBS SF/LOW 40: CPT | Performed by: STUDENT IN AN ORGANIZED HEALTH CARE EDUCATION/TRAINING PROGRAM

## 2022-02-13 PROCEDURE — 05HN33Z INSERTION OF INFUSION DEVICE INTO LEFT INTERNAL JUGULAR VEIN, PERCUTANEOUS APPROACH: ICD-10-PCS | Performed by: INTERNAL MEDICINE

## 2022-02-13 PROCEDURE — 85025 COMPLETE CBC W/AUTO DIFF WBC: CPT | Performed by: INTERNAL MEDICINE

## 2022-02-13 PROCEDURE — 85018 HEMOGLOBIN: CPT | Performed by: INTERNAL MEDICINE

## 2022-02-13 PROCEDURE — 80069 RENAL FUNCTION PANEL: CPT | Performed by: INTERNAL MEDICINE

## 2022-02-13 PROCEDURE — 84145 PROCALCITONIN (PCT): CPT | Performed by: NURSE PRACTITIONER

## 2022-02-13 PROCEDURE — 82962 GLUCOSE BLOOD TEST: CPT

## 2022-02-13 PROCEDURE — 85730 THROMBOPLASTIN TIME PARTIAL: CPT | Performed by: INTERNAL MEDICINE

## 2022-02-13 RX ADMIN — SODIUM CHLORIDE 500 ML: 9 INJECTION, SOLUTION INTRAVENOUS at 01:53

## 2022-02-13 RX ADMIN — COLLAGENASE SANTYL 1 APPLICATION: 250 OINTMENT TOPICAL at 20:38

## 2022-02-13 RX ADMIN — ACETAMINOPHEN 650 MG: 325 TABLET, FILM COATED ORAL at 01:03

## 2022-02-13 RX ADMIN — PANTOPRAZOLE SODIUM 40 MG: 40 TABLET, DELAYED RELEASE ORAL at 06:16

## 2022-02-13 RX ADMIN — ONDANSETRON 4 MG: 2 INJECTION INTRAMUSCULAR; INTRAVENOUS at 04:44

## 2022-02-13 RX ADMIN — SODIUM CHLORIDE 500 ML: 9 INJECTION, SOLUTION INTRAVENOUS at 08:57

## 2022-02-13 RX ADMIN — DOCUSATE SODIUM 50MG AND SENNOSIDES 8.6MG 2 TABLET: 8.6; 5 TABLET, FILM COATED ORAL at 20:38

## 2022-02-14 LAB
ALBUMIN SERPL-MCNC: 2.1 G/DL (ref 3.5–5.2)
ANION GAP SERPL CALCULATED.3IONS-SCNC: 8 MMOL/L (ref 5–15)
BASOPHILS # BLD AUTO: 0.03 10*3/MM3 (ref 0–0.2)
BASOPHILS NFR BLD AUTO: 0.2 % (ref 0–1.5)
BH BB BLOOD EXPIRATION DATE: NORMAL
BH BB BLOOD TYPE BARCODE: 6200
BH BB DISPENSE STATUS: NORMAL
BH BB PRODUCT CODE: NORMAL
BH BB UNIT NUMBER: NORMAL
BUN SERPL-MCNC: 36 MG/DL (ref 8–23)
BUN/CREAT SERPL: 25.9 (ref 7–25)
CALCIUM SPEC-SCNC: 7.4 MG/DL (ref 8.6–10.5)
CHLORIDE SERPL-SCNC: 106 MMOL/L (ref 98–107)
CO2 SERPL-SCNC: 24 MMOL/L (ref 22–29)
CREAT SERPL-MCNC: 1.39 MG/DL (ref 0.76–1.27)
CROSSMATCH INTERPRETATION: NORMAL
DEPRECATED RDW RBC AUTO: 46.6 FL (ref 37–54)
EOSINOPHIL # BLD AUTO: 0 10*3/MM3 (ref 0–0.4)
EOSINOPHIL NFR BLD AUTO: 0 % (ref 0.3–6.2)
ERYTHROCYTE [DISTWIDTH] IN BLOOD BY AUTOMATED COUNT: 15.8 % (ref 12.3–15.4)
GFR SERPL CREATININE-BSD FRML MDRD: 50 ML/MIN/1.73
GLUCOSE SERPL-MCNC: 74 MG/DL (ref 65–99)
HCT VFR BLD AUTO: 31.8 % (ref 37.5–51)
HCT VFR BLD AUTO: 32.5 % (ref 37.5–51)
HCT VFR BLD AUTO: 33.2 % (ref 37.5–51)
HGB BLD-MCNC: 10.7 G/DL (ref 13–17.7)
HGB BLD-MCNC: 11 G/DL (ref 13–17.7)
HGB BLD-MCNC: 11.1 G/DL (ref 13–17.7)
LYMPHOCYTES # BLD AUTO: 0.53 10*3/MM3 (ref 0.7–3.1)
LYMPHOCYTES NFR BLD AUTO: 3 % (ref 19.6–45.3)
MAGNESIUM SERPL-MCNC: 1.8 MG/DL (ref 1.6–2.4)
MCH RBC QN AUTO: 29.3 PG (ref 26.6–33)
MCHC RBC AUTO-ENTMCNC: 34.6 G/DL (ref 31.5–35.7)
MCV RBC AUTO: 84.6 FL (ref 79–97)
MONOCYTES # BLD AUTO: 0.56 10*3/MM3 (ref 0.1–0.9)
MONOCYTES NFR BLD AUTO: 3.1 % (ref 5–12)
NEUTROPHILS NFR BLD AUTO: 16.35 10*3/MM3 (ref 1.7–7)
NEUTROPHILS NFR BLD AUTO: 91 % (ref 42.7–76)
PHOSPHATE SERPL-MCNC: 3.7 MG/DL (ref 2.5–4.5)
PLATELET # BLD AUTO: 96 10*3/MM3 (ref 140–450)
PMV BLD AUTO: 11.1 FL (ref 6–12)
POTASSIUM SERPL-SCNC: 4.6 MMOL/L (ref 3.5–5.2)
RBC # BLD AUTO: 3.76 10*6/MM3 (ref 4.14–5.8)
SODIUM SERPL-SCNC: 138 MMOL/L (ref 136–145)
UNIT  ABO: NORMAL
UNIT  RH: NORMAL
WBC NRBC COR # BLD: 17.95 10*3/MM3 (ref 3.4–10.8)

## 2022-02-14 PROCEDURE — 97530 THERAPEUTIC ACTIVITIES: CPT

## 2022-02-14 PROCEDURE — 97110 THERAPEUTIC EXERCISES: CPT

## 2022-02-14 PROCEDURE — 85018 HEMOGLOBIN: CPT | Performed by: INTERNAL MEDICINE

## 2022-02-14 PROCEDURE — 83735 ASSAY OF MAGNESIUM: CPT | Performed by: INTERNAL MEDICINE

## 2022-02-14 PROCEDURE — 85025 COMPLETE CBC W/AUTO DIFF WBC: CPT | Performed by: INTERNAL MEDICINE

## 2022-02-14 PROCEDURE — 85014 HEMATOCRIT: CPT | Performed by: INTERNAL MEDICINE

## 2022-02-14 PROCEDURE — 97535 SELF CARE MNGMENT TRAINING: CPT

## 2022-02-14 PROCEDURE — 80069 RENAL FUNCTION PANEL: CPT | Performed by: INTERNAL MEDICINE

## 2022-02-14 PROCEDURE — 99232 SBSQ HOSP IP/OBS MODERATE 35: CPT | Performed by: STUDENT IN AN ORGANIZED HEALTH CARE EDUCATION/TRAINING PROGRAM

## 2022-02-14 RX ADMIN — COLLAGENASE SANTYL 1 APPLICATION: 250 OINTMENT TOPICAL at 21:20

## 2022-02-14 RX ADMIN — DOCUSATE SODIUM 50MG AND SENNOSIDES 8.6MG 2 TABLET: 8.6; 5 TABLET, FILM COATED ORAL at 13:41

## 2022-02-14 RX ADMIN — PANTOPRAZOLE SODIUM 40 MG: 40 TABLET, DELAYED RELEASE ORAL at 05:20

## 2022-02-14 RX ADMIN — DOCUSATE SODIUM 50MG AND SENNOSIDES 8.6MG 2 TABLET: 8.6; 5 TABLET, FILM COATED ORAL at 20:38

## 2022-02-14 RX ADMIN — COLLAGENASE SANTYL 1 APPLICATION: 250 OINTMENT TOPICAL at 09:30

## 2022-02-15 LAB
ALBUMIN SERPL-MCNC: 1.9 G/DL (ref 3.5–5.2)
ANION GAP SERPL CALCULATED.3IONS-SCNC: 6 MMOL/L (ref 5–15)
BASOPHILS # BLD AUTO: 0.02 10*3/MM3 (ref 0–0.2)
BASOPHILS NFR BLD AUTO: 0.1 % (ref 0–1.5)
BUN SERPL-MCNC: 34 MG/DL (ref 8–23)
BUN/CREAT SERPL: 26.4 (ref 7–25)
CALCIUM SPEC-SCNC: 7 MG/DL (ref 8.6–10.5)
CHLORIDE SERPL-SCNC: 107 MMOL/L (ref 98–107)
CO2 SERPL-SCNC: 23 MMOL/L (ref 22–29)
CREAT SERPL-MCNC: 1.29 MG/DL (ref 0.76–1.27)
DEPRECATED RDW RBC AUTO: 51.9 FL (ref 37–54)
EOSINOPHIL # BLD AUTO: 0.03 10*3/MM3 (ref 0–0.4)
EOSINOPHIL NFR BLD AUTO: 0.2 % (ref 0.3–6.2)
ERYTHROCYTE [DISTWIDTH] IN BLOOD BY AUTOMATED COUNT: 16.1 % (ref 12.3–15.4)
GFR SERPL CREATININE-BSD FRML MDRD: 55 ML/MIN/1.73
GLUCOSE BLDC GLUCOMTR-MCNC: 121 MG/DL (ref 70–130)
GLUCOSE SERPL-MCNC: 133 MG/DL (ref 65–99)
HCT VFR BLD AUTO: 30.8 % (ref 37.5–51)
HCT VFR BLD AUTO: 30.8 % (ref 37.5–51)
HCT VFR BLD AUTO: 35 % (ref 37.5–51)
HGB BLD-MCNC: 10 G/DL (ref 13–17.7)
HGB BLD-MCNC: 10.2 G/DL (ref 13–17.7)
HGB BLD-MCNC: 11.7 G/DL (ref 13–17.7)
IMM GRANULOCYTES # BLD AUTO: 0.29 10*3/MM3 (ref 0–0.05)
IMM GRANULOCYTES NFR BLD AUTO: 1.7 % (ref 0–0.5)
LYMPHOCYTES # BLD AUTO: 0.37 10*3/MM3 (ref 0.7–3.1)
LYMPHOCYTES NFR BLD AUTO: 2.2 % (ref 19.6–45.3)
MCH RBC QN AUTO: 29.4 PG (ref 26.6–33)
MCHC RBC AUTO-ENTMCNC: 32.5 G/DL (ref 31.5–35.7)
MCV RBC AUTO: 90.6 FL (ref 79–97)
MONOCYTES # BLD AUTO: 0.41 10*3/MM3 (ref 0.1–0.9)
MONOCYTES NFR BLD AUTO: 2.5 % (ref 5–12)
NEUTROPHILS NFR BLD AUTO: 15.58 10*3/MM3 (ref 1.7–7)
NEUTROPHILS NFR BLD AUTO: 93.3 % (ref 42.7–76)
NRBC BLD AUTO-RTO: 0.1 /100 WBC (ref 0–0.2)
PHOSPHATE SERPL-MCNC: 2.1 MG/DL (ref 2.5–4.5)
PLATELET # BLD AUTO: 90 10*3/MM3 (ref 140–450)
PMV BLD AUTO: 10.4 FL (ref 6–12)
POTASSIUM SERPL-SCNC: 4.3 MMOL/L (ref 3.5–5.2)
RBC # BLD AUTO: 3.4 10*6/MM3 (ref 4.14–5.8)
SODIUM SERPL-SCNC: 136 MMOL/L (ref 136–145)
WBC NRBC COR # BLD: 16.7 10*3/MM3 (ref 3.4–10.8)

## 2022-02-15 PROCEDURE — 85014 HEMATOCRIT: CPT | Performed by: INTERNAL MEDICINE

## 2022-02-15 PROCEDURE — 82962 GLUCOSE BLOOD TEST: CPT

## 2022-02-15 PROCEDURE — 92526 ORAL FUNCTION THERAPY: CPT

## 2022-02-15 PROCEDURE — 85018 HEMOGLOBIN: CPT | Performed by: INTERNAL MEDICINE

## 2022-02-15 PROCEDURE — 99232 SBSQ HOSP IP/OBS MODERATE 35: CPT | Performed by: STUDENT IN AN ORGANIZED HEALTH CARE EDUCATION/TRAINING PROGRAM

## 2022-02-15 PROCEDURE — 25010000002 FUROSEMIDE PER 20 MG: Performed by: INTERNAL MEDICINE

## 2022-02-15 PROCEDURE — 80069 RENAL FUNCTION PANEL: CPT | Performed by: INTERNAL MEDICINE

## 2022-02-15 PROCEDURE — 85025 COMPLETE CBC W/AUTO DIFF WBC: CPT | Performed by: INTERNAL MEDICINE

## 2022-02-15 PROCEDURE — 94799 UNLISTED PULMONARY SVC/PX: CPT

## 2022-02-15 RX ORDER — FUROSEMIDE 10 MG/ML
40 INJECTION INTRAMUSCULAR; INTRAVENOUS EVERY 12 HOURS
Status: COMPLETED | OUTPATIENT
Start: 2022-02-15 | End: 2022-02-15

## 2022-02-15 RX ADMIN — COLLAGENASE SANTYL 1 APPLICATION: 250 OINTMENT TOPICAL at 09:01

## 2022-02-15 RX ADMIN — SODIUM CHLORIDE, PRESERVATIVE FREE 10 ML: 5 INJECTION INTRAVENOUS at 21:58

## 2022-02-15 RX ADMIN — FUROSEMIDE 40 MG: 10 INJECTION INTRAMUSCULAR; INTRAVENOUS at 21:58

## 2022-02-15 RX ADMIN — Medication 1 MG: at 21:58

## 2022-02-15 RX ADMIN — FUROSEMIDE 40 MG: 10 INJECTION INTRAMUSCULAR; INTRAVENOUS at 12:21

## 2022-02-15 RX ADMIN — COLLAGENASE SANTYL 1 APPLICATION: 250 OINTMENT TOPICAL at 21:58

## 2022-02-15 RX ADMIN — DOCUSATE SODIUM 50MG AND SENNOSIDES 8.6MG 2 TABLET: 8.6; 5 TABLET, FILM COATED ORAL at 21:58

## 2022-02-15 RX ADMIN — PANTOPRAZOLE SODIUM 40 MG: 40 TABLET, DELAYED RELEASE ORAL at 06:42

## 2022-02-16 LAB
ALBUMIN SERPL-MCNC: 2 G/DL (ref 3.5–5.2)
ALBUMIN/GLOB SERPL: 1 G/DL
ALP SERPL-CCNC: 97 U/L (ref 39–117)
ALT SERPL W P-5'-P-CCNC: 33 U/L (ref 1–41)
ANION GAP SERPL CALCULATED.3IONS-SCNC: 8 MMOL/L (ref 5–15)
AST SERPL-CCNC: 31 U/L (ref 1–40)
BASOPHILS # BLD AUTO: 0.03 10*3/MM3 (ref 0–0.2)
BASOPHILS NFR BLD AUTO: 0.2 % (ref 0–1.5)
BILIRUB SERPL-MCNC: 1.4 MG/DL (ref 0–1.2)
BUN SERPL-MCNC: 30 MG/DL (ref 8–23)
BUN/CREAT SERPL: 27.8 (ref 7–25)
CALCIUM SPEC-SCNC: 7.2 MG/DL (ref 8.6–10.5)
CHLORIDE SERPL-SCNC: 101 MMOL/L (ref 98–107)
CO2 SERPL-SCNC: 26 MMOL/L (ref 22–29)
CREAT SERPL-MCNC: 1.08 MG/DL (ref 0.76–1.27)
DEPRECATED RDW RBC AUTO: 52.5 FL (ref 37–54)
EOSINOPHIL # BLD AUTO: 0.04 10*3/MM3 (ref 0–0.4)
EOSINOPHIL NFR BLD AUTO: 0.2 % (ref 0.3–6.2)
ERYTHROCYTE [DISTWIDTH] IN BLOOD BY AUTOMATED COUNT: 16.4 % (ref 12.3–15.4)
GFR SERPL CREATININE-BSD FRML MDRD: 67 ML/MIN/1.73
GLOBULIN UR ELPH-MCNC: 2.1 GM/DL
GLUCOSE SERPL-MCNC: 79 MG/DL (ref 65–99)
HCT VFR BLD AUTO: 33.6 % (ref 37.5–51)
HGB BLD-MCNC: 11 G/DL (ref 13–17.7)
IMM GRANULOCYTES # BLD AUTO: 0.26 10*3/MM3 (ref 0–0.05)
IMM GRANULOCYTES NFR BLD AUTO: 1.3 % (ref 0–0.5)
LYMPHOCYTES # BLD AUTO: 0.45 10*3/MM3 (ref 0.7–3.1)
LYMPHOCYTES NFR BLD AUTO: 2.3 % (ref 19.6–45.3)
MAGNESIUM SERPL-MCNC: 1.8 MG/DL (ref 1.6–2.4)
MCH RBC QN AUTO: 29.6 PG (ref 26.6–33)
MCHC RBC AUTO-ENTMCNC: 32.7 G/DL (ref 31.5–35.7)
MCV RBC AUTO: 90.6 FL (ref 79–97)
MONOCYTES # BLD AUTO: 0.63 10*3/MM3 (ref 0.1–0.9)
MONOCYTES NFR BLD AUTO: 3.2 % (ref 5–12)
NEUTROPHILS NFR BLD AUTO: 18.37 10*3/MM3 (ref 1.7–7)
NEUTROPHILS NFR BLD AUTO: 92.8 % (ref 42.7–76)
NRBC BLD AUTO-RTO: 0 /100 WBC (ref 0–0.2)
PHOSPHATE SERPL-MCNC: 2.8 MG/DL (ref 2.5–4.5)
PLATELET # BLD AUTO: 113 10*3/MM3 (ref 140–450)
PMV BLD AUTO: 10.8 FL (ref 6–12)
POTASSIUM SERPL-SCNC: 3.6 MMOL/L (ref 3.5–5.2)
PROT SERPL-MCNC: 4.1 G/DL (ref 6–8.5)
RBC # BLD AUTO: 3.71 10*6/MM3 (ref 4.14–5.8)
SODIUM SERPL-SCNC: 135 MMOL/L (ref 136–145)
WBC NRBC COR # BLD: 19.78 10*3/MM3 (ref 3.4–10.8)

## 2022-02-16 PROCEDURE — 97530 THERAPEUTIC ACTIVITIES: CPT

## 2022-02-16 PROCEDURE — 25010000002 FUROSEMIDE PER 20 MG: Performed by: INTERNAL MEDICINE

## 2022-02-16 PROCEDURE — 85025 COMPLETE CBC W/AUTO DIFF WBC: CPT | Performed by: INTERNAL MEDICINE

## 2022-02-16 PROCEDURE — 97110 THERAPEUTIC EXERCISES: CPT

## 2022-02-16 PROCEDURE — 84100 ASSAY OF PHOSPHORUS: CPT | Performed by: INTERNAL MEDICINE

## 2022-02-16 PROCEDURE — 80053 COMPREHEN METABOLIC PANEL: CPT | Performed by: INTERNAL MEDICINE

## 2022-02-16 PROCEDURE — 83735 ASSAY OF MAGNESIUM: CPT | Performed by: INTERNAL MEDICINE

## 2022-02-16 RX ORDER — FUROSEMIDE 10 MG/ML
40 INJECTION INTRAMUSCULAR; INTRAVENOUS ONCE
Status: COMPLETED | OUTPATIENT
Start: 2022-02-16 | End: 2022-02-16

## 2022-02-16 RX ADMIN — PANTOPRAZOLE SODIUM 40 MG: 40 TABLET, DELAYED RELEASE ORAL at 05:27

## 2022-02-16 RX ADMIN — FUROSEMIDE 40 MG: 10 INJECTION INTRAMUSCULAR; INTRAVENOUS at 18:01

## 2022-02-16 RX ADMIN — COLLAGENASE SANTYL 1 APPLICATION: 250 OINTMENT TOPICAL at 20:24

## 2022-02-16 RX ADMIN — COLLAGENASE SANTYL 1 APPLICATION: 250 OINTMENT TOPICAL at 10:54

## 2022-02-16 RX ADMIN — DOCUSATE SODIUM 50MG AND SENNOSIDES 8.6MG 2 TABLET: 8.6; 5 TABLET, FILM COATED ORAL at 20:21

## 2022-02-17 LAB
ALBUMIN SERPL-MCNC: 1.5 G/DL (ref 3.5–5.2)
ANION GAP SERPL CALCULATED.3IONS-SCNC: 7.3 MMOL/L (ref 5–15)
BASOPHILS # BLD AUTO: 0.02 10*3/MM3 (ref 0–0.2)
BASOPHILS NFR BLD AUTO: 0.1 % (ref 0–1.5)
BUN SERPL-MCNC: 32 MG/DL (ref 8–23)
BUN/CREAT SERPL: 29.9 (ref 7–25)
CALCIUM SPEC-SCNC: 7.5 MG/DL (ref 8.6–10.5)
CHLORIDE SERPL-SCNC: 101 MMOL/L (ref 98–107)
CLUMPED PLATELETS: PRESENT
CO2 SERPL-SCNC: 25.7 MMOL/L (ref 22–29)
CREAT SERPL-MCNC: 1.07 MG/DL (ref 0.76–1.27)
DEPRECATED RDW RBC AUTO: 53 FL (ref 37–54)
EOSINOPHIL # BLD AUTO: 0.05 10*3/MM3 (ref 0–0.4)
EOSINOPHIL NFR BLD AUTO: 0.3 % (ref 0.3–6.2)
ERYTHROCYTE [DISTWIDTH] IN BLOOD BY AUTOMATED COUNT: 16.2 % (ref 12.3–15.4)
GFR SERPL CREATININE-BSD FRML MDRD: 68 ML/MIN/1.73
GLUCOSE SERPL-MCNC: 81 MG/DL (ref 65–99)
HCT VFR BLD AUTO: 32.9 % (ref 37.5–51)
HCT VFR BLD AUTO: 36.8 % (ref 37.5–51)
HGB BLD-MCNC: 10.4 G/DL (ref 13–17.7)
HGB BLD-MCNC: 12.2 G/DL (ref 13–17.7)
LYMPHOCYTES # BLD AUTO: 0.49 10*3/MM3 (ref 0.7–3.1)
LYMPHOCYTES NFR BLD AUTO: 3.1 % (ref 19.6–45.3)
MCH RBC QN AUTO: 28.8 PG (ref 26.6–33)
MCHC RBC AUTO-ENTMCNC: 31.6 G/DL (ref 31.5–35.7)
MCV RBC AUTO: 91.1 FL (ref 79–97)
MONOCYTES # BLD AUTO: 0.7 10*3/MM3 (ref 0.1–0.9)
MONOCYTES NFR BLD AUTO: 4.4 % (ref 5–12)
NEUTROPHILS NFR BLD AUTO: 14.49 10*3/MM3 (ref 1.7–7)
NEUTROPHILS NFR BLD AUTO: 91 % (ref 42.7–76)
PHOSPHATE SERPL-MCNC: 2.3 MG/DL (ref 2.5–4.5)
PLATELET # BLD AUTO: 121 10*3/MM3 (ref 140–450)
PMV BLD AUTO: 10.1 FL (ref 6–12)
POTASSIUM SERPL-SCNC: 4 MMOL/L (ref 3.5–5.2)
QT INTERVAL: 308 MS
RBC # BLD AUTO: 3.61 10*6/MM3 (ref 4.14–5.8)
RBC MORPH BLD: NORMAL
SODIUM SERPL-SCNC: 134 MMOL/L (ref 136–145)
WBC MORPH BLD: NORMAL
WBC NRBC COR # BLD: 15.93 10*3/MM3 (ref 3.4–10.8)

## 2022-02-17 PROCEDURE — 93010 ELECTROCARDIOGRAM REPORT: CPT | Performed by: INTERNAL MEDICINE

## 2022-02-17 PROCEDURE — 97110 THERAPEUTIC EXERCISES: CPT

## 2022-02-17 PROCEDURE — 85025 COMPLETE CBC W/AUTO DIFF WBC: CPT | Performed by: INTERNAL MEDICINE

## 2022-02-17 PROCEDURE — 85018 HEMOGLOBIN: CPT | Performed by: INTERNAL MEDICINE

## 2022-02-17 PROCEDURE — 80069 RENAL FUNCTION PANEL: CPT | Performed by: INTERNAL MEDICINE

## 2022-02-17 PROCEDURE — 97535 SELF CARE MNGMENT TRAINING: CPT

## 2022-02-17 PROCEDURE — 85014 HEMATOCRIT: CPT | Performed by: INTERNAL MEDICINE

## 2022-02-17 PROCEDURE — 93005 ELECTROCARDIOGRAM TRACING: CPT | Performed by: INTERNAL MEDICINE

## 2022-02-17 PROCEDURE — 85007 BL SMEAR W/DIFF WBC COUNT: CPT | Performed by: INTERNAL MEDICINE

## 2022-02-17 RX ADMIN — SODIUM PHOSPHATE, MONOBASIC, MONOHYDRATE AND SODIUM PHOSPHATE, DIBASIC, ANHYDROUS 10 MMOL: 276; 142 INJECTION, SOLUTION INTRAVENOUS at 17:13

## 2022-02-17 RX ADMIN — COLLAGENASE SANTYL 1 APPLICATION: 250 OINTMENT TOPICAL at 20:03

## 2022-02-17 RX ADMIN — COLLAGENASE SANTYL 1 APPLICATION: 250 OINTMENT TOPICAL at 09:12

## 2022-02-18 LAB
ALBUMIN SERPL-MCNC: 1.5 G/DL (ref 3.5–5.2)
ANION GAP SERPL CALCULATED.3IONS-SCNC: 7.8 MMOL/L (ref 5–15)
BACTERIA SPEC AEROBE CULT: NORMAL
BACTERIA SPEC AEROBE CULT: NORMAL
BASOPHILS # BLD AUTO: 0.02 10*3/MM3 (ref 0–0.2)
BASOPHILS NFR BLD AUTO: 0.1 % (ref 0–1.5)
BUN SERPL-MCNC: 32 MG/DL (ref 8–23)
BUN/CREAT SERPL: 36.8 (ref 7–25)
CALCIUM SPEC-SCNC: 7.3 MG/DL (ref 8.6–10.5)
CHLORIDE SERPL-SCNC: 101 MMOL/L (ref 98–107)
CO2 SERPL-SCNC: 27.2 MMOL/L (ref 22–29)
CREAT SERPL-MCNC: 0.87 MG/DL (ref 0.76–1.27)
DEPRECATED RDW RBC AUTO: 53.2 FL (ref 37–54)
EOSINOPHIL # BLD AUTO: 0.08 10*3/MM3 (ref 0–0.4)
EOSINOPHIL NFR BLD AUTO: 0.5 % (ref 0.3–6.2)
ERYTHROCYTE [DISTWIDTH] IN BLOOD BY AUTOMATED COUNT: 15.9 % (ref 12.3–15.4)
GFR SERPL CREATININE-BSD FRML MDRD: 86 ML/MIN/1.73
GLUCOSE SERPL-MCNC: 86 MG/DL (ref 65–99)
HCT VFR BLD AUTO: 30.6 % (ref 37.5–51)
HCT VFR BLD AUTO: 33.1 % (ref 37.5–51)
HGB BLD-MCNC: 10.2 G/DL (ref 13–17.7)
HGB BLD-MCNC: 10.5 G/DL (ref 13–17.7)
LYMPHOCYTES # BLD AUTO: 0.43 10*3/MM3 (ref 0.7–3.1)
LYMPHOCYTES NFR BLD AUTO: 2.9 % (ref 19.6–45.3)
MAGNESIUM SERPL-MCNC: 1.8 MG/DL (ref 1.6–2.4)
MCH RBC QN AUTO: 29.4 PG (ref 26.6–33)
MCHC RBC AUTO-ENTMCNC: 31.7 G/DL (ref 31.5–35.7)
MCV RBC AUTO: 92.7 FL (ref 79–97)
MONOCYTES # BLD AUTO: 0.73 10*3/MM3 (ref 0.1–0.9)
MONOCYTES NFR BLD AUTO: 4.9 % (ref 5–12)
NEUTROPHILS NFR BLD AUTO: 13.43 10*3/MM3 (ref 1.7–7)
NEUTROPHILS NFR BLD AUTO: 90.7 % (ref 42.7–76)
PHOSPHATE SERPL-MCNC: 2.1 MG/DL (ref 2.5–4.5)
PLATELET # BLD AUTO: 131 10*3/MM3 (ref 140–450)
PMV BLD AUTO: 10.6 FL (ref 6–12)
POTASSIUM SERPL-SCNC: 3.9 MMOL/L (ref 3.5–5.2)
RBC # BLD AUTO: 3.57 10*6/MM3 (ref 4.14–5.8)
SODIUM SERPL-SCNC: 136 MMOL/L (ref 136–145)
WBC NRBC COR # BLD: 14.82 10*3/MM3 (ref 3.4–10.8)

## 2022-02-18 PROCEDURE — 84100 ASSAY OF PHOSPHORUS: CPT | Performed by: INTERNAL MEDICINE

## 2022-02-18 PROCEDURE — 85014 HEMATOCRIT: CPT | Performed by: INTERNAL MEDICINE

## 2022-02-18 PROCEDURE — 85025 COMPLETE CBC W/AUTO DIFF WBC: CPT | Performed by: INTERNAL MEDICINE

## 2022-02-18 PROCEDURE — 97110 THERAPEUTIC EXERCISES: CPT

## 2022-02-18 PROCEDURE — 85018 HEMOGLOBIN: CPT | Performed by: INTERNAL MEDICINE

## 2022-02-18 PROCEDURE — 83735 ASSAY OF MAGNESIUM: CPT | Performed by: INTERNAL MEDICINE

## 2022-02-18 PROCEDURE — 80069 RENAL FUNCTION PANEL: CPT | Performed by: INTERNAL MEDICINE

## 2022-02-18 RX ADMIN — PANTOPRAZOLE SODIUM 40 MG: 40 TABLET, DELAYED RELEASE ORAL at 05:18

## 2022-02-18 RX ADMIN — Medication 2 PACKET: at 17:18

## 2022-02-18 RX ADMIN — COLLAGENASE SANTYL 1 APPLICATION: 250 OINTMENT TOPICAL at 09:27

## 2022-02-18 RX ADMIN — DOCUSATE SODIUM 50MG AND SENNOSIDES 8.6MG 2 TABLET: 8.6; 5 TABLET, FILM COATED ORAL at 09:27

## 2022-02-18 RX ADMIN — COLLAGENASE SANTYL 1 APPLICATION: 250 OINTMENT TOPICAL at 21:24

## 2022-02-19 LAB
ALBUMIN SERPL-MCNC: 1.9 G/DL (ref 3.5–5.2)
ANION GAP SERPL CALCULATED.3IONS-SCNC: 6 MMOL/L (ref 5–15)
BASOPHILS # BLD AUTO: 0.01 10*3/MM3 (ref 0–0.2)
BASOPHILS NFR BLD AUTO: 0.1 % (ref 0–1.5)
BUN SERPL-MCNC: 28 MG/DL (ref 8–23)
BUN/CREAT SERPL: 34.6 (ref 7–25)
CALCIUM SPEC-SCNC: 7.1 MG/DL (ref 8.6–10.5)
CHLORIDE SERPL-SCNC: 103 MMOL/L (ref 98–107)
CO2 SERPL-SCNC: 23 MMOL/L (ref 22–29)
CREAT SERPL-MCNC: 0.81 MG/DL (ref 0.76–1.27)
DEPRECATED RDW RBC AUTO: 48.2 FL (ref 37–54)
EOSINOPHIL # BLD AUTO: 0.04 10*3/MM3 (ref 0–0.4)
EOSINOPHIL NFR BLD AUTO: 0.3 % (ref 0.3–6.2)
ERYTHROCYTE [DISTWIDTH] IN BLOOD BY AUTOMATED COUNT: 15.7 % (ref 12.3–15.4)
GFR SERPL CREATININE-BSD FRML MDRD: 93 ML/MIN/1.73
GLUCOSE SERPL-MCNC: 84 MG/DL (ref 65–99)
HCT VFR BLD AUTO: 29.8 % (ref 37.5–51)
HGB BLD-MCNC: 10.1 G/DL (ref 13–17.7)
IMM GRANULOCYTES # BLD AUTO: 0.11 10*3/MM3 (ref 0–0.05)
IMM GRANULOCYTES NFR BLD AUTO: 0.9 % (ref 0–0.5)
LYMPHOCYTES # BLD AUTO: 0.41 10*3/MM3 (ref 0.7–3.1)
LYMPHOCYTES NFR BLD AUTO: 3.3 % (ref 19.6–45.3)
MCH RBC QN AUTO: 29.8 PG (ref 26.6–33)
MCHC RBC AUTO-ENTMCNC: 33.9 G/DL (ref 31.5–35.7)
MCV RBC AUTO: 87.9 FL (ref 79–97)
MONOCYTES # BLD AUTO: 0.8 10*3/MM3 (ref 0.1–0.9)
MONOCYTES NFR BLD AUTO: 6.4 % (ref 5–12)
NEUTROPHILS NFR BLD AUTO: 11.15 10*3/MM3 (ref 1.7–7)
NEUTROPHILS NFR BLD AUTO: 89 % (ref 42.7–76)
NRBC BLD AUTO-RTO: 0 /100 WBC (ref 0–0.2)
PHOSPHATE SERPL-MCNC: 1.7 MG/DL (ref 2.5–4.5)
PHOSPHATE SERPL-MCNC: 2.2 MG/DL (ref 2.5–4.5)
PHOSPHATE SERPL-MCNC: 2.4 MG/DL (ref 2.5–4.5)
PLATELET # BLD AUTO: 130 10*3/MM3 (ref 140–450)
PMV BLD AUTO: 9.7 FL (ref 6–12)
POTASSIUM SERPL-SCNC: 4.7 MMOL/L (ref 3.5–5.2)
RBC # BLD AUTO: 3.39 10*6/MM3 (ref 4.14–5.8)
SODIUM SERPL-SCNC: 132 MMOL/L (ref 136–145)
WBC NRBC COR # BLD: 12.52 10*3/MM3 (ref 3.4–10.8)

## 2022-02-19 PROCEDURE — 85025 COMPLETE CBC W/AUTO DIFF WBC: CPT | Performed by: INTERNAL MEDICINE

## 2022-02-19 PROCEDURE — 84100 ASSAY OF PHOSPHORUS: CPT | Performed by: INTERNAL MEDICINE

## 2022-02-19 PROCEDURE — 80069 RENAL FUNCTION PANEL: CPT | Performed by: INTERNAL MEDICINE

## 2022-02-19 RX ADMIN — COLLAGENASE SANTYL 1 APPLICATION: 250 OINTMENT TOPICAL at 13:09

## 2022-02-19 RX ADMIN — DOCUSATE SODIUM 50MG AND SENNOSIDES 8.6MG 2 TABLET: 8.6; 5 TABLET, FILM COATED ORAL at 21:23

## 2022-02-19 RX ADMIN — COLLAGENASE SANTYL 1 APPLICATION: 250 OINTMENT TOPICAL at 21:24

## 2022-02-19 RX ADMIN — Medication 2 PACKET: at 00:44

## 2022-02-19 RX ADMIN — PANTOPRAZOLE SODIUM 40 MG: 40 TABLET, DELAYED RELEASE ORAL at 06:45

## 2022-02-20 LAB
ALBUMIN SERPL-MCNC: 1.9 G/DL (ref 3.5–5.2)
ANION GAP SERPL CALCULATED.3IONS-SCNC: 5 MMOL/L (ref 5–15)
BASOPHILS # BLD AUTO: 0.02 10*3/MM3 (ref 0–0.2)
BASOPHILS NFR BLD AUTO: 0.2 % (ref 0–1.5)
BUN SERPL-MCNC: 28 MG/DL (ref 8–23)
BUN/CREAT SERPL: 33.7 (ref 7–25)
CALCIUM SPEC-SCNC: 7.4 MG/DL (ref 8.6–10.5)
CHLORIDE SERPL-SCNC: 105 MMOL/L (ref 98–107)
CO2 SERPL-SCNC: 25 MMOL/L (ref 22–29)
CREAT SERPL-MCNC: 0.83 MG/DL (ref 0.76–1.27)
DEPRECATED RDW RBC AUTO: 49.8 FL (ref 37–54)
EOSINOPHIL # BLD AUTO: 0.05 10*3/MM3 (ref 0–0.4)
EOSINOPHIL NFR BLD AUTO: 0.5 % (ref 0.3–6.2)
ERYTHROCYTE [DISTWIDTH] IN BLOOD BY AUTOMATED COUNT: 15.6 % (ref 12.3–15.4)
GFR SERPL CREATININE-BSD FRML MDRD: 91 ML/MIN/1.73
GLUCOSE SERPL-MCNC: 73 MG/DL (ref 65–99)
HCT VFR BLD AUTO: 32.1 % (ref 37.5–51)
HGB BLD-MCNC: 10.6 G/DL (ref 13–17.7)
IMM GRANULOCYTES # BLD AUTO: 0.1 10*3/MM3 (ref 0–0.05)
IMM GRANULOCYTES NFR BLD AUTO: 1 % (ref 0–0.5)
LYMPHOCYTES # BLD AUTO: 0.4 10*3/MM3 (ref 0.7–3.1)
LYMPHOCYTES NFR BLD AUTO: 4 % (ref 19.6–45.3)
MCH RBC QN AUTO: 29.7 PG (ref 26.6–33)
MCHC RBC AUTO-ENTMCNC: 33 G/DL (ref 31.5–35.7)
MCV RBC AUTO: 89.9 FL (ref 79–97)
MONOCYTES # BLD AUTO: 0.57 10*3/MM3 (ref 0.1–0.9)
MONOCYTES NFR BLD AUTO: 5.7 % (ref 5–12)
NEUTROPHILS NFR BLD AUTO: 8.79 10*3/MM3 (ref 1.7–7)
NEUTROPHILS NFR BLD AUTO: 88.6 % (ref 42.7–76)
NRBC BLD AUTO-RTO: 0 /100 WBC (ref 0–0.2)
PHOSPHATE SERPL-MCNC: 2.3 MG/DL (ref 2.5–4.5)
PHOSPHATE SERPL-MCNC: 2.5 MG/DL (ref 2.5–4.5)
PLATELET # BLD AUTO: 146 10*3/MM3 (ref 140–450)
PMV BLD AUTO: 9.3 FL (ref 6–12)
POTASSIUM SERPL-SCNC: 4.4 MMOL/L (ref 3.5–5.2)
QT INTERVAL: 327 MS
RBC # BLD AUTO: 3.57 10*6/MM3 (ref 4.14–5.8)
SODIUM SERPL-SCNC: 135 MMOL/L (ref 136–145)
WBC NRBC COR # BLD: 9.93 10*3/MM3 (ref 3.4–10.8)

## 2022-02-20 PROCEDURE — 93005 ELECTROCARDIOGRAM TRACING: CPT | Performed by: HOSPITALIST

## 2022-02-20 PROCEDURE — 80069 RENAL FUNCTION PANEL: CPT | Performed by: INTERNAL MEDICINE

## 2022-02-20 PROCEDURE — 85025 COMPLETE CBC W/AUTO DIFF WBC: CPT | Performed by: INTERNAL MEDICINE

## 2022-02-20 PROCEDURE — 84100 ASSAY OF PHOSPHORUS: CPT | Performed by: INTERNAL MEDICINE

## 2022-02-20 PROCEDURE — 93010 ELECTROCARDIOGRAM REPORT: CPT | Performed by: INTERNAL MEDICINE

## 2022-02-20 RX ADMIN — COLLAGENASE SANTYL 1 APPLICATION: 250 OINTMENT TOPICAL at 15:37

## 2022-02-20 RX ADMIN — Medication 2 PACKET: at 05:39

## 2022-02-20 RX ADMIN — PANTOPRAZOLE SODIUM 40 MG: 40 TABLET, DELAYED RELEASE ORAL at 05:39

## 2022-02-20 RX ADMIN — Medication 2 PACKET: at 20:24

## 2022-02-20 RX ADMIN — Medication 1 MG: at 20:24

## 2022-02-20 RX ADMIN — COLLAGENASE SANTYL 1 APPLICATION: 250 OINTMENT TOPICAL at 20:29

## 2022-02-21 LAB
ALBUMIN SERPL-MCNC: 1.7 G/DL (ref 3.5–5.2)
ANION GAP SERPL CALCULATED.3IONS-SCNC: 6.5 MMOL/L (ref 5–15)
BUN SERPL-MCNC: 32 MG/DL (ref 8–23)
BUN/CREAT SERPL: 38.6 (ref 7–25)
CALCIUM SPEC-SCNC: 7.7 MG/DL (ref 8.6–10.5)
CHLORIDE SERPL-SCNC: 105 MMOL/L (ref 98–107)
CO2 SERPL-SCNC: 24.5 MMOL/L (ref 22–29)
CREAT SERPL-MCNC: 0.83 MG/DL (ref 0.76–1.27)
GFR SERPL CREATININE-BSD FRML MDRD: 91 ML/MIN/1.73
GLUCOSE SERPL-MCNC: 87 MG/DL (ref 65–99)
PHOSPHATE SERPL-MCNC: 2.4 MG/DL (ref 2.5–4.5)
POTASSIUM SERPL-SCNC: 4.2 MMOL/L (ref 3.5–5.2)
SODIUM SERPL-SCNC: 136 MMOL/L (ref 136–145)

## 2022-02-21 PROCEDURE — 97110 THERAPEUTIC EXERCISES: CPT

## 2022-02-21 PROCEDURE — 80069 RENAL FUNCTION PANEL: CPT | Performed by: INTERNAL MEDICINE

## 2022-02-21 RX ADMIN — COLLAGENASE SANTYL 1 APPLICATION: 250 OINTMENT TOPICAL at 20:33

## 2022-02-21 RX ADMIN — PANTOPRAZOLE SODIUM 40 MG: 40 TABLET, DELAYED RELEASE ORAL at 06:55

## 2022-02-21 RX ADMIN — COLLAGENASE SANTYL 1 APPLICATION: 250 OINTMENT TOPICAL at 15:55

## 2022-02-21 RX ADMIN — ACETAMINOPHEN 650 MG: 325 TABLET, FILM COATED ORAL at 22:09

## 2022-02-21 RX ADMIN — Medication 1 MG: at 22:09

## 2022-02-22 LAB
ALBUMIN SERPL-MCNC: 1.9 G/DL (ref 3.5–5.2)
ANION GAP SERPL CALCULATED.3IONS-SCNC: 9.4 MMOL/L (ref 5–15)
BUN SERPL-MCNC: 27 MG/DL (ref 8–23)
BUN/CREAT SERPL: 30 (ref 7–25)
CALCIUM SPEC-SCNC: 7.7 MG/DL (ref 8.6–10.5)
CHLORIDE SERPL-SCNC: 103 MMOL/L (ref 98–107)
CO2 SERPL-SCNC: 25.6 MMOL/L (ref 22–29)
CREAT SERPL-MCNC: 0.9 MG/DL (ref 0.76–1.27)
GFR SERPL CREATININE-BSD FRML MDRD: 83 ML/MIN/1.73
GLUCOSE SERPL-MCNC: 85 MG/DL (ref 65–99)
PHOSPHATE SERPL-MCNC: 2.6 MG/DL (ref 2.5–4.5)
POTASSIUM SERPL-SCNC: 4.1 MMOL/L (ref 3.5–5.2)
SODIUM SERPL-SCNC: 138 MMOL/L (ref 136–145)

## 2022-02-22 PROCEDURE — 97110 THERAPEUTIC EXERCISES: CPT

## 2022-02-22 PROCEDURE — 80069 RENAL FUNCTION PANEL: CPT | Performed by: INTERNAL MEDICINE

## 2022-02-22 RX ADMIN — COLLAGENASE SANTYL 1 APPLICATION: 250 OINTMENT TOPICAL at 21:18

## 2022-02-22 RX ADMIN — COLLAGENASE SANTYL 1 APPLICATION: 250 OINTMENT TOPICAL at 09:10

## 2022-02-22 RX ADMIN — ACETAMINOPHEN 650 MG: 325 TABLET, FILM COATED ORAL at 21:18

## 2022-02-22 RX ADMIN — Medication 1 MG: at 21:18

## 2022-02-22 RX ADMIN — PANTOPRAZOLE SODIUM 40 MG: 40 TABLET, DELAYED RELEASE ORAL at 09:10

## 2022-02-23 LAB
ALBUMIN SERPL-MCNC: 1.9 G/DL (ref 3.5–5.2)
ANION GAP SERPL CALCULATED.3IONS-SCNC: 7 MMOL/L (ref 5–15)
BUN SERPL-MCNC: 29 MG/DL (ref 8–23)
BUN/CREAT SERPL: 36.7 (ref 7–25)
CALCIUM SPEC-SCNC: 7.6 MG/DL (ref 8.6–10.5)
CHLORIDE SERPL-SCNC: 106 MMOL/L (ref 98–107)
CO2 SERPL-SCNC: 26 MMOL/L (ref 22–29)
CREAT SERPL-MCNC: 0.79 MG/DL (ref 0.76–1.27)
GFR SERPL CREATININE-BSD FRML MDRD: 96 ML/MIN/1.73
GLUCOSE SERPL-MCNC: 74 MG/DL (ref 65–99)
PHOSPHATE SERPL-MCNC: 2.5 MG/DL (ref 2.5–4.5)
POTASSIUM SERPL-SCNC: 3.9 MMOL/L (ref 3.5–5.2)
SODIUM SERPL-SCNC: 139 MMOL/L (ref 136–145)

## 2022-02-23 PROCEDURE — 80069 RENAL FUNCTION PANEL: CPT | Performed by: INTERNAL MEDICINE

## 2022-02-23 RX ADMIN — PANTOPRAZOLE SODIUM 40 MG: 40 TABLET, DELAYED RELEASE ORAL at 08:32

## 2022-02-23 RX ADMIN — COLLAGENASE SANTYL 1 APPLICATION: 250 OINTMENT TOPICAL at 08:33

## 2022-02-23 RX ADMIN — COLLAGENASE SANTYL 1 APPLICATION: 250 OINTMENT TOPICAL at 23:53

## 2022-02-24 LAB
ALBUMIN SERPL-MCNC: 1.8 G/DL (ref 3.5–5.2)
ANION GAP SERPL CALCULATED.3IONS-SCNC: 9.9 MMOL/L (ref 5–15)
BUN SERPL-MCNC: 29 MG/DL (ref 8–23)
BUN/CREAT SERPL: 37.7 (ref 7–25)
CALCIUM SPEC-SCNC: 7.8 MG/DL (ref 8.6–10.5)
CHLORIDE SERPL-SCNC: 104 MMOL/L (ref 98–107)
CO2 SERPL-SCNC: 24.1 MMOL/L (ref 22–29)
CREAT SERPL-MCNC: 0.77 MG/DL (ref 0.76–1.27)
GFR SERPL CREATININE-BSD FRML MDRD: 99 ML/MIN/1.73
GLUCOSE SERPL-MCNC: 104 MG/DL (ref 65–99)
HEMOCCULT STL QL: POSITIVE
PHOSPHATE SERPL-MCNC: 2.3 MG/DL (ref 2.5–4.5)
POTASSIUM SERPL-SCNC: 4 MMOL/L (ref 3.5–5.2)
SODIUM SERPL-SCNC: 138 MMOL/L (ref 136–145)

## 2022-02-24 PROCEDURE — 97112 NEUROMUSCULAR REEDUCATION: CPT

## 2022-02-24 PROCEDURE — 97530 THERAPEUTIC ACTIVITIES: CPT

## 2022-02-24 PROCEDURE — 80069 RENAL FUNCTION PANEL: CPT | Performed by: INTERNAL MEDICINE

## 2022-02-24 PROCEDURE — 97110 THERAPEUTIC EXERCISES: CPT

## 2022-02-24 PROCEDURE — 82272 OCCULT BLD FECES 1-3 TESTS: CPT | Performed by: INTERNAL MEDICINE

## 2022-02-24 RX ADMIN — SODIUM CHLORIDE, PRESERVATIVE FREE 10 ML: 5 INJECTION INTRAVENOUS at 08:11

## 2022-02-24 RX ADMIN — COLLAGENASE SANTYL 1 APPLICATION: 250 OINTMENT TOPICAL at 22:10

## 2022-02-24 RX ADMIN — COLLAGENASE SANTYL 1 APPLICATION: 250 OINTMENT TOPICAL at 08:11

## 2022-02-24 RX ADMIN — PANTOPRAZOLE SODIUM 40 MG: 40 TABLET, DELAYED RELEASE ORAL at 08:11

## 2022-02-25 LAB
ALBUMIN SERPL-MCNC: 1.7 G/DL (ref 3.5–5.2)
ANION GAP SERPL CALCULATED.3IONS-SCNC: 9.8 MMOL/L (ref 5–15)
BASOPHILS # BLD AUTO: 0.07 10*3/MM3 (ref 0–0.2)
BASOPHILS NFR BLD AUTO: 0.9 % (ref 0–1.5)
BUN SERPL-MCNC: 28 MG/DL (ref 8–23)
BUN/CREAT SERPL: 35.4 (ref 7–25)
C DIFF TOX GENS STL QL NAA+PROBE: POSITIVE
CALCIUM SPEC-SCNC: 7.9 MG/DL (ref 8.6–10.5)
CHLORIDE SERPL-SCNC: 102 MMOL/L (ref 98–107)
CO2 SERPL-SCNC: 23.2 MMOL/L (ref 22–29)
CREAT SERPL-MCNC: 0.79 MG/DL (ref 0.76–1.27)
DEPRECATED RDW RBC AUTO: 48.6 FL (ref 37–54)
EOSINOPHIL # BLD AUTO: 0.17 10*3/MM3 (ref 0–0.4)
EOSINOPHIL NFR BLD AUTO: 2.1 % (ref 0.3–6.2)
ERYTHROCYTE [DISTWIDTH] IN BLOOD BY AUTOMATED COUNT: 15.3 % (ref 12.3–15.4)
GFR SERPL CREATININE-BSD FRML MDRD: 96 ML/MIN/1.73
GLUCOSE SERPL-MCNC: 98 MG/DL (ref 65–99)
HCT VFR BLD AUTO: 31.1 % (ref 37.5–51)
HGB BLD-MCNC: 10 G/DL (ref 13–17.7)
LYMPHOCYTES # BLD AUTO: 0.5 10*3/MM3 (ref 0.7–3.1)
LYMPHOCYTES NFR BLD AUTO: 6.3 % (ref 19.6–45.3)
MCH RBC QN AUTO: 28.8 PG (ref 26.6–33)
MCHC RBC AUTO-ENTMCNC: 32.2 G/DL (ref 31.5–35.7)
MCV RBC AUTO: 89.6 FL (ref 79–97)
MONOCYTES # BLD AUTO: 0.6 10*3/MM3 (ref 0.1–0.9)
MONOCYTES NFR BLD AUTO: 7.5 % (ref 5–12)
NEUTROPHILS NFR BLD AUTO: 6.36 10*3/MM3 (ref 1.7–7)
NEUTROPHILS NFR BLD AUTO: 79.8 % (ref 42.7–76)
PHOSPHATE SERPL-MCNC: 1.8 MG/DL (ref 2.5–4.5)
PHOSPHATE SERPL-MCNC: 2.2 MG/DL (ref 2.5–4.5)
PLATELET # BLD AUTO: 302 10*3/MM3 (ref 140–450)
PMV BLD AUTO: 10.2 FL (ref 6–12)
POTASSIUM SERPL-SCNC: 3.9 MMOL/L (ref 3.5–5.2)
RBC # BLD AUTO: 3.47 10*6/MM3 (ref 4.14–5.8)
SODIUM SERPL-SCNC: 135 MMOL/L (ref 136–145)
WBC NRBC COR # BLD: 7.97 10*3/MM3 (ref 3.4–10.8)

## 2022-02-25 PROCEDURE — 80069 RENAL FUNCTION PANEL: CPT | Performed by: INTERNAL MEDICINE

## 2022-02-25 PROCEDURE — 84100 ASSAY OF PHOSPHORUS: CPT | Performed by: HOSPITALIST

## 2022-02-25 PROCEDURE — 87493 C DIFF AMPLIFIED PROBE: CPT | Performed by: HOSPITALIST

## 2022-02-25 PROCEDURE — 85025 COMPLETE CBC W/AUTO DIFF WBC: CPT | Performed by: HOSPITALIST

## 2022-02-25 RX ORDER — FAMOTIDINE 20 MG/1
20 TABLET, FILM COATED ORAL
Status: DISCONTINUED | OUTPATIENT
Start: 2022-02-25 | End: 2022-03-11 | Stop reason: HOSPADM

## 2022-02-25 RX ORDER — VANCOMYCIN HYDROCHLORIDE 125 MG/1
125 CAPSULE ORAL EVERY 6 HOURS SCHEDULED
Status: DISCONTINUED | OUTPATIENT
Start: 2022-02-25 | End: 2022-03-11 | Stop reason: HOSPADM

## 2022-02-25 RX ORDER — LOPERAMIDE HYDROCHLORIDE 2 MG/1
4 CAPSULE ORAL ONCE
Status: COMPLETED | OUTPATIENT
Start: 2022-02-25 | End: 2022-02-25

## 2022-02-25 RX ADMIN — VANCOMYCIN HYDROCHLORIDE 125 MG: 125 CAPSULE ORAL at 09:41

## 2022-02-25 RX ADMIN — VANCOMYCIN HYDROCHLORIDE 125 MG: 125 CAPSULE ORAL at 21:47

## 2022-02-25 RX ADMIN — LOPERAMIDE HYDROCHLORIDE 4 MG: 2 CAPSULE ORAL at 23:27

## 2022-02-25 RX ADMIN — COLLAGENASE SANTYL 1 APPLICATION: 250 OINTMENT TOPICAL at 11:38

## 2022-02-25 RX ADMIN — FAMOTIDINE 20 MG: 20 TABLET ORAL at 16:52

## 2022-02-25 RX ADMIN — VANCOMYCIN HYDROCHLORIDE 125 MG: 125 CAPSULE ORAL at 15:48

## 2022-02-25 RX ADMIN — Medication 2 PACKET: at 11:37

## 2022-02-25 RX ADMIN — FAMOTIDINE 20 MG: 20 TABLET ORAL at 09:41

## 2022-02-26 LAB
ALBUMIN SERPL-MCNC: 1.8 G/DL (ref 3.5–5.2)
ANION GAP SERPL CALCULATED.3IONS-SCNC: 12.3 MMOL/L (ref 5–15)
BUN SERPL-MCNC: 23 MG/DL (ref 8–23)
BUN/CREAT SERPL: 27.7 (ref 7–25)
CALCIUM SPEC-SCNC: 8 MG/DL (ref 8.6–10.5)
CHLORIDE SERPL-SCNC: 103 MMOL/L (ref 98–107)
CO2 SERPL-SCNC: 21.7 MMOL/L (ref 22–29)
CREAT SERPL-MCNC: 0.83 MG/DL (ref 0.76–1.27)
GFR SERPL CREATININE-BSD FRML MDRD: 91 ML/MIN/1.73
GLUCOSE SERPL-MCNC: 71 MG/DL (ref 65–99)
PHOSPHATE SERPL-MCNC: 2.9 MG/DL (ref 2.5–4.5)
POTASSIUM SERPL-SCNC: 4.4 MMOL/L (ref 3.5–5.2)
SODIUM SERPL-SCNC: 137 MMOL/L (ref 136–145)

## 2022-02-26 PROCEDURE — 93005 ELECTROCARDIOGRAM TRACING: CPT | Performed by: HOSPITALIST

## 2022-02-26 PROCEDURE — 80069 RENAL FUNCTION PANEL: CPT | Performed by: INTERNAL MEDICINE

## 2022-02-26 PROCEDURE — 93010 ELECTROCARDIOGRAM REPORT: CPT | Performed by: INTERNAL MEDICINE

## 2022-02-26 RX ADMIN — COLLAGENASE SANTYL 1 APPLICATION: 250 OINTMENT TOPICAL at 14:39

## 2022-02-26 RX ADMIN — VANCOMYCIN HYDROCHLORIDE 125 MG: 125 CAPSULE ORAL at 08:58

## 2022-02-26 RX ADMIN — VANCOMYCIN HYDROCHLORIDE 125 MG: 125 CAPSULE ORAL at 14:36

## 2022-02-26 RX ADMIN — VANCOMYCIN HYDROCHLORIDE 125 MG: 125 CAPSULE ORAL at 21:20

## 2022-02-26 RX ADMIN — FAMOTIDINE 20 MG: 20 TABLET ORAL at 06:39

## 2022-02-26 RX ADMIN — FAMOTIDINE 20 MG: 20 TABLET ORAL at 17:16

## 2022-02-26 RX ADMIN — VANCOMYCIN HYDROCHLORIDE 125 MG: 125 CAPSULE ORAL at 04:12

## 2022-02-27 LAB
ALBUMIN SERPL-MCNC: 2 G/DL (ref 3.5–5.2)
ANION GAP SERPL CALCULATED.3IONS-SCNC: 8 MMOL/L (ref 5–15)
BUN SERPL-MCNC: 26 MG/DL (ref 8–23)
BUN/CREAT SERPL: 30.6 (ref 7–25)
CALCIUM SPEC-SCNC: 7.6 MG/DL (ref 8.6–10.5)
CHLORIDE SERPL-SCNC: 102 MMOL/L (ref 98–107)
CO2 SERPL-SCNC: 24 MMOL/L (ref 22–29)
CREAT SERPL-MCNC: 0.85 MG/DL (ref 0.76–1.27)
CRP SERPL-MCNC: 16.24 MG/DL (ref 0–0.5)
DEPRECATED RDW RBC AUTO: 50 FL (ref 37–54)
EOSINOPHIL # BLD MANUAL: 0.22 10*3/MM3 (ref 0–0.4)
EOSINOPHIL NFR BLD MANUAL: 3.2 % (ref 0.3–6.2)
ERYTHROCYTE [DISTWIDTH] IN BLOOD BY AUTOMATED COUNT: 15.5 % (ref 12.3–15.4)
GFR SERPL CREATININE-BSD FRML MDRD: 88 ML/MIN/1.73
GLUCOSE SERPL-MCNC: 105 MG/DL (ref 65–99)
HCT VFR BLD AUTO: 31.6 % (ref 37.5–51)
HGB BLD-MCNC: 9.8 G/DL (ref 13–17.7)
LYMPHOCYTES # BLD MANUAL: 0.58 10*3/MM3 (ref 0.7–3.1)
LYMPHOCYTES NFR BLD MANUAL: 10.5 % (ref 5–12)
MAGNESIUM SERPL-MCNC: 1.8 MG/DL (ref 1.6–2.4)
MCH RBC QN AUTO: 28.2 PG (ref 26.6–33)
MCHC RBC AUTO-ENTMCNC: 31 G/DL (ref 31.5–35.7)
MCV RBC AUTO: 90.8 FL (ref 79–97)
METAMYELOCYTES NFR BLD MANUAL: 1.1 % (ref 0–0)
MONOCYTES # BLD: 0.72 10*3/MM3 (ref 0.1–0.9)
MYELOCYTES NFR BLD MANUAL: 2.1 % (ref 0–0)
NEUTROPHILS # BLD AUTO: 5.12 10*3/MM3 (ref 1.7–7)
NEUTROPHILS NFR BLD MANUAL: 74.7 % (ref 42.7–76)
PHOSPHATE SERPL-MCNC: 2.3 MG/DL (ref 2.5–4.5)
PLAT MORPH BLD: NORMAL
PLATELET # BLD AUTO: 452 10*3/MM3 (ref 140–450)
PMV BLD AUTO: 9.9 FL (ref 6–12)
POTASSIUM SERPL-SCNC: 4.1 MMOL/L (ref 3.5–5.2)
RBC # BLD AUTO: 3.48 10*6/MM3 (ref 4.14–5.8)
RBC MORPH BLD: NORMAL
SODIUM SERPL-SCNC: 134 MMOL/L (ref 136–145)
TROPONIN T SERPL-MCNC: 0.13 NG/ML (ref 0–0.03)
TROPONIN T SERPL-MCNC: 0.15 NG/ML (ref 0–0.03)
VARIANT LYMPHS NFR BLD MANUAL: 3.2 % (ref 0–5)
VARIANT LYMPHS NFR BLD MANUAL: 5.3 % (ref 19.6–45.3)
WBC MORPH BLD: NORMAL
WBC NRBC COR # BLD: 6.85 10*3/MM3 (ref 3.4–10.8)

## 2022-02-27 PROCEDURE — 80069 RENAL FUNCTION PANEL: CPT | Performed by: INTERNAL MEDICINE

## 2022-02-27 PROCEDURE — 86140 C-REACTIVE PROTEIN: CPT | Performed by: HOSPITALIST

## 2022-02-27 PROCEDURE — 85025 COMPLETE CBC W/AUTO DIFF WBC: CPT | Performed by: HOSPITALIST

## 2022-02-27 PROCEDURE — 84484 ASSAY OF TROPONIN QUANT: CPT | Performed by: HOSPITALIST

## 2022-02-27 PROCEDURE — 99232 SBSQ HOSP IP/OBS MODERATE 35: CPT | Performed by: INTERNAL MEDICINE

## 2022-02-27 PROCEDURE — 85007 BL SMEAR W/DIFF WBC COUNT: CPT | Performed by: HOSPITALIST

## 2022-02-27 PROCEDURE — 83735 ASSAY OF MAGNESIUM: CPT | Performed by: HOSPITALIST

## 2022-02-27 RX ORDER — CHOLESTYRAMINE 4 G/9G
1 POWDER, FOR SUSPENSION ORAL 2 TIMES DAILY
Status: DISCONTINUED | OUTPATIENT
Start: 2022-02-27 | End: 2022-03-11 | Stop reason: HOSPADM

## 2022-02-27 RX ADMIN — VANCOMYCIN HYDROCHLORIDE 125 MG: 125 CAPSULE ORAL at 15:00

## 2022-02-27 RX ADMIN — FAMOTIDINE 20 MG: 20 TABLET ORAL at 17:19

## 2022-02-27 RX ADMIN — CHOLESTYRAMINE 1 PACKET: 4 POWDER, FOR SUSPENSION ORAL at 22:11

## 2022-02-27 RX ADMIN — VANCOMYCIN HYDROCHLORIDE 125 MG: 125 CAPSULE ORAL at 08:52

## 2022-02-27 RX ADMIN — FAMOTIDINE 20 MG: 20 TABLET ORAL at 07:02

## 2022-02-27 RX ADMIN — VANCOMYCIN HYDROCHLORIDE 125 MG: 125 CAPSULE ORAL at 20:08

## 2022-02-27 RX ADMIN — VANCOMYCIN HYDROCHLORIDE 125 MG: 125 CAPSULE ORAL at 02:19

## 2022-02-27 RX ADMIN — COLLAGENASE SANTYL 1 APPLICATION: 250 OINTMENT TOPICAL at 08:52

## 2022-02-28 LAB
ALBUMIN SERPL-MCNC: 1.4 G/DL (ref 3.5–5.2)
ALBUMIN/GLOB SERPL: 0.4 G/DL
ALP SERPL-CCNC: 106 U/L (ref 39–117)
ALT SERPL W P-5'-P-CCNC: 15 U/L (ref 1–41)
ANION GAP SERPL CALCULATED.3IONS-SCNC: 9.1 MMOL/L (ref 5–15)
AST SERPL-CCNC: 22 U/L (ref 1–40)
BASOPHILS # BLD MANUAL: 0.11 10*3/MM3 (ref 0–0.2)
BASOPHILS NFR BLD MANUAL: 1 % (ref 0–1.5)
BILIRUB SERPL-MCNC: 0.5 MG/DL (ref 0–1.2)
BUN SERPL-MCNC: 22 MG/DL (ref 8–23)
BUN/CREAT SERPL: 27.5 (ref 7–25)
CALCIUM SPEC-SCNC: 7.8 MG/DL (ref 8.6–10.5)
CHLORIDE SERPL-SCNC: 104 MMOL/L (ref 98–107)
CO2 SERPL-SCNC: 21.9 MMOL/L (ref 22–29)
CREAT SERPL-MCNC: 0.8 MG/DL (ref 0.76–1.27)
DEPRECATED RDW RBC AUTO: 48.1 FL (ref 37–54)
EOSINOPHIL # BLD MANUAL: 0.11 10*3/MM3 (ref 0–0.4)
EOSINOPHIL NFR BLD MANUAL: 1 % (ref 0.3–6.2)
ERYTHROCYTE [DISTWIDTH] IN BLOOD BY AUTOMATED COUNT: 15.3 % (ref 12.3–15.4)
GFR SERPL CREATININE-BSD FRML MDRD: 95 ML/MIN/1.73
GLOBULIN UR ELPH-MCNC: 3.4 GM/DL
GLUCOSE SERPL-MCNC: 89 MG/DL (ref 65–99)
HCT VFR BLD AUTO: 32.6 % (ref 37.5–51)
HGB BLD-MCNC: 10.4 G/DL (ref 13–17.7)
LYMPHOCYTES # BLD MANUAL: 2.64 10*3/MM3 (ref 0.7–3.1)
LYMPHOCYTES NFR BLD MANUAL: 11 % (ref 5–12)
MAGNESIUM SERPL-MCNC: 1.8 MG/DL (ref 1.6–2.4)
MCH RBC QN AUTO: 28.7 PG (ref 26.6–33)
MCHC RBC AUTO-ENTMCNC: 31.9 G/DL (ref 31.5–35.7)
MCV RBC AUTO: 89.8 FL (ref 79–97)
METAMYELOCYTES NFR BLD MANUAL: 3 % (ref 0–0)
MONOCYTES # BLD: 1.26 10*3/MM3 (ref 0.1–0.9)
NEUTROPHILS # BLD AUTO: 6.99 10*3/MM3 (ref 1.7–7)
NEUTROPHILS NFR BLD MANUAL: 61 % (ref 42.7–76)
PHOSPHATE SERPL-MCNC: 1.8 MG/DL (ref 2.5–4.5)
PLAT MORPH BLD: NORMAL
PLATELET # BLD AUTO: 494 10*3/MM3 (ref 140–450)
PMV BLD AUTO: 10.1 FL (ref 6–12)
POTASSIUM SERPL-SCNC: 4.2 MMOL/L (ref 3.5–5.2)
PROT SERPL-MCNC: 4.8 G/DL (ref 6–8.5)
QT INTERVAL: 320 MS
RBC # BLD AUTO: 3.63 10*6/MM3 (ref 4.14–5.8)
RBC MORPH BLD: NORMAL
SODIUM SERPL-SCNC: 135 MMOL/L (ref 136–145)
TROPONIN T SERPL-MCNC: 0.14 NG/ML (ref 0–0.03)
VARIANT LYMPHS NFR BLD MANUAL: 19 % (ref 19.6–45.3)
VARIANT LYMPHS NFR BLD MANUAL: 4 % (ref 0–5)
WBC MORPH BLD: NORMAL
WBC NRBC COR # BLD: 11.46 10*3/MM3 (ref 3.4–10.8)

## 2022-02-28 PROCEDURE — 80053 COMPREHEN METABOLIC PANEL: CPT | Performed by: STUDENT IN AN ORGANIZED HEALTH CARE EDUCATION/TRAINING PROGRAM

## 2022-02-28 PROCEDURE — 99232 SBSQ HOSP IP/OBS MODERATE 35: CPT | Performed by: INTERNAL MEDICINE

## 2022-02-28 PROCEDURE — 84100 ASSAY OF PHOSPHORUS: CPT | Performed by: INTERNAL MEDICINE

## 2022-02-28 PROCEDURE — 85025 COMPLETE CBC W/AUTO DIFF WBC: CPT | Performed by: STUDENT IN AN ORGANIZED HEALTH CARE EDUCATION/TRAINING PROGRAM

## 2022-02-28 PROCEDURE — 85007 BL SMEAR W/DIFF WBC COUNT: CPT | Performed by: STUDENT IN AN ORGANIZED HEALTH CARE EDUCATION/TRAINING PROGRAM

## 2022-02-28 PROCEDURE — 83735 ASSAY OF MAGNESIUM: CPT | Performed by: STUDENT IN AN ORGANIZED HEALTH CARE EDUCATION/TRAINING PROGRAM

## 2022-02-28 PROCEDURE — 84484 ASSAY OF TROPONIN QUANT: CPT | Performed by: STUDENT IN AN ORGANIZED HEALTH CARE EDUCATION/TRAINING PROGRAM

## 2022-02-28 RX ORDER — SODIUM CHLORIDE 9 MG/ML
100 INJECTION, SOLUTION INTRAVENOUS CONTINUOUS
Status: ACTIVE | OUTPATIENT
Start: 2022-02-28 | End: 2022-03-01

## 2022-02-28 RX ADMIN — FAMOTIDINE 20 MG: 20 TABLET ORAL at 06:48

## 2022-02-28 RX ADMIN — FAMOTIDINE 20 MG: 20 TABLET ORAL at 18:01

## 2022-02-28 RX ADMIN — SODIUM CHLORIDE 100 ML/HR: 9 INJECTION, SOLUTION INTRAVENOUS at 21:55

## 2022-02-28 RX ADMIN — DILTIAZEM HYDROCHLORIDE 30 MG: 30 TABLET, FILM COATED ORAL at 16:00

## 2022-02-28 RX ADMIN — VANCOMYCIN HYDROCHLORIDE 125 MG: 125 CAPSULE ORAL at 03:41

## 2022-02-28 RX ADMIN — DILTIAZEM HYDROCHLORIDE 30 MG: 30 TABLET, FILM COATED ORAL at 21:46

## 2022-02-28 RX ADMIN — VANCOMYCIN HYDROCHLORIDE 125 MG: 125 CAPSULE ORAL at 21:46

## 2022-02-28 RX ADMIN — SODIUM PHOSPHATE, MONOBASIC, MONOHYDRATE 20 MMOL: 276; 142 INJECTION, SOLUTION INTRAVENOUS at 09:47

## 2022-02-28 RX ADMIN — CHOLESTYRAMINE 1 PACKET: 4 POWDER, FOR SUSPENSION ORAL at 21:46

## 2022-02-28 RX ADMIN — COLLAGENASE SANTYL 1 APPLICATION: 250 OINTMENT TOPICAL at 09:47

## 2022-02-28 RX ADMIN — CHOLESTYRAMINE 1 PACKET: 4 POWDER, FOR SUSPENSION ORAL at 11:12

## 2022-02-28 RX ADMIN — VANCOMYCIN HYDROCHLORIDE 125 MG: 125 CAPSULE ORAL at 16:00

## 2022-02-28 RX ADMIN — Medication 1 MG: at 21:54

## 2022-02-28 RX ADMIN — VANCOMYCIN HYDROCHLORIDE 125 MG: 125 CAPSULE ORAL at 09:47

## 2022-03-01 LAB
ALBUMIN SERPL-MCNC: 1.8 G/DL (ref 3.5–5.2)
ALBUMIN/GLOB SERPL: 0.7 G/DL
ALP SERPL-CCNC: 91 U/L (ref 39–117)
ALT SERPL W P-5'-P-CCNC: 12 U/L (ref 1–41)
ANION GAP SERPL CALCULATED.3IONS-SCNC: 8 MMOL/L (ref 5–15)
AST SERPL-CCNC: 14 U/L (ref 1–40)
BILIRUB SERPL-MCNC: 0.5 MG/DL (ref 0–1.2)
BUN SERPL-MCNC: 17 MG/DL (ref 8–23)
BUN/CREAT SERPL: 27 (ref 7–25)
CALCIUM SPEC-SCNC: 7.4 MG/DL (ref 8.6–10.5)
CHLORIDE SERPL-SCNC: 103 MMOL/L (ref 98–107)
CO2 SERPL-SCNC: 24 MMOL/L (ref 22–29)
CREAT SERPL-MCNC: 0.63 MG/DL (ref 0.76–1.27)
DEPRECATED RDW RBC AUTO: 52.7 FL (ref 37–54)
EGFRCR SERPLBLD CKD-EPI 2021: 100.4 ML/MIN/1.73
EOSINOPHIL # BLD MANUAL: 0.15 10*3/MM3 (ref 0–0.4)
EOSINOPHIL NFR BLD MANUAL: 1 % (ref 0.3–6.2)
ERYTHROCYTE [DISTWIDTH] IN BLOOD BY AUTOMATED COUNT: 15.5 % (ref 12.3–15.4)
GLOBULIN UR ELPH-MCNC: 2.6 GM/DL
GLUCOSE SERPL-MCNC: 97 MG/DL (ref 65–99)
HCT VFR BLD AUTO: 30.3 % (ref 37.5–51)
HGB BLD-MCNC: 9.5 G/DL (ref 13–17.7)
LYMPHOCYTES # BLD MANUAL: 2.14 10*3/MM3 (ref 0.7–3.1)
LYMPHOCYTES NFR BLD MANUAL: 12 % (ref 5–12)
MAGNESIUM SERPL-MCNC: 1.8 MG/DL (ref 1.6–2.4)
MCH RBC QN AUTO: 29 PG (ref 26.6–33)
MCHC RBC AUTO-ENTMCNC: 31.4 G/DL (ref 31.5–35.7)
MCV RBC AUTO: 92.4 FL (ref 79–97)
METAMYELOCYTES NFR BLD MANUAL: 1 % (ref 0–0)
MONOCYTES # BLD: 1.83 10*3/MM3 (ref 0.1–0.9)
MYELOCYTES NFR BLD MANUAL: 4 % (ref 0–0)
NEUTROPHILS # BLD AUTO: 10.07 10*3/MM3 (ref 1.7–7)
NEUTROPHILS NFR BLD MANUAL: 66 % (ref 42.7–76)
PHOSPHATE SERPL-MCNC: 2.7 MG/DL (ref 2.5–4.5)
PLAT MORPH BLD: NORMAL
PLATELET # BLD AUTO: 516 10*3/MM3 (ref 140–450)
PMV BLD AUTO: 10 FL (ref 6–12)
POTASSIUM SERPL-SCNC: 3.8 MMOL/L (ref 3.5–5.2)
PROCALCITONIN SERPL-MCNC: 0.22 NG/ML (ref 0–0.25)
PROMYELOCYTES NFR BLD MANUAL: 2 % (ref 0–0)
PROT SERPL-MCNC: 4.4 G/DL (ref 6–8.5)
RBC # BLD AUTO: 3.28 10*6/MM3 (ref 4.14–5.8)
RBC MORPH BLD: NORMAL
SODIUM SERPL-SCNC: 135 MMOL/L (ref 136–145)
VARIANT LYMPHS NFR BLD MANUAL: 14 % (ref 19.6–45.3)
WBC MORPH BLD: NORMAL
WBC NRBC COR # BLD: 15.26 10*3/MM3 (ref 3.4–10.8)

## 2022-03-01 PROCEDURE — 97110 THERAPEUTIC EXERCISES: CPT

## 2022-03-01 PROCEDURE — 84145 PROCALCITONIN (PCT): CPT | Performed by: STUDENT IN AN ORGANIZED HEALTH CARE EDUCATION/TRAINING PROGRAM

## 2022-03-01 PROCEDURE — 80053 COMPREHEN METABOLIC PANEL: CPT | Performed by: STUDENT IN AN ORGANIZED HEALTH CARE EDUCATION/TRAINING PROGRAM

## 2022-03-01 PROCEDURE — 99232 SBSQ HOSP IP/OBS MODERATE 35: CPT | Performed by: NURSE PRACTITIONER

## 2022-03-01 PROCEDURE — 83735 ASSAY OF MAGNESIUM: CPT | Performed by: STUDENT IN AN ORGANIZED HEALTH CARE EDUCATION/TRAINING PROGRAM

## 2022-03-01 PROCEDURE — 85007 BL SMEAR W/DIFF WBC COUNT: CPT | Performed by: STUDENT IN AN ORGANIZED HEALTH CARE EDUCATION/TRAINING PROGRAM

## 2022-03-01 PROCEDURE — 84100 ASSAY OF PHOSPHORUS: CPT | Performed by: INTERNAL MEDICINE

## 2022-03-01 PROCEDURE — 85025 COMPLETE CBC W/AUTO DIFF WBC: CPT | Performed by: STUDENT IN AN ORGANIZED HEALTH CARE EDUCATION/TRAINING PROGRAM

## 2022-03-01 PROCEDURE — 97530 THERAPEUTIC ACTIVITIES: CPT

## 2022-03-01 RX ORDER — METRONIDAZOLE 500 MG/100ML
500 INJECTION, SOLUTION INTRAVENOUS EVERY 8 HOURS
Status: DISCONTINUED | OUTPATIENT
Start: 2022-03-01 | End: 2022-03-07

## 2022-03-01 RX ADMIN — DILTIAZEM HYDROCHLORIDE 30 MG: 30 TABLET, FILM COATED ORAL at 06:44

## 2022-03-01 RX ADMIN — Medication 1 MG: at 22:48

## 2022-03-01 RX ADMIN — COLLAGENASE SANTYL 1 APPLICATION: 250 OINTMENT TOPICAL at 03:45

## 2022-03-01 RX ADMIN — COLLAGENASE SANTYL 1 APPLICATION: 250 OINTMENT TOPICAL at 15:03

## 2022-03-01 RX ADMIN — VANCOMYCIN HYDROCHLORIDE 125 MG: 125 CAPSULE ORAL at 15:03

## 2022-03-01 RX ADMIN — CHOLESTYRAMINE 1 PACKET: 4 POWDER, FOR SUSPENSION ORAL at 10:44

## 2022-03-01 RX ADMIN — VANCOMYCIN HYDROCHLORIDE 125 MG: 125 CAPSULE ORAL at 22:34

## 2022-03-01 RX ADMIN — DILTIAZEM HYDROCHLORIDE 30 MG: 30 TABLET, FILM COATED ORAL at 15:03

## 2022-03-01 RX ADMIN — METRONIDAZOLE 500 MG: 500 INJECTION, SOLUTION INTRAVENOUS at 22:48

## 2022-03-01 RX ADMIN — VANCOMYCIN HYDROCHLORIDE 125 MG: 125 CAPSULE ORAL at 09:38

## 2022-03-01 RX ADMIN — VANCOMYCIN HYDROCHLORIDE 125 MG: 125 CAPSULE ORAL at 03:45

## 2022-03-01 RX ADMIN — FAMOTIDINE 20 MG: 20 TABLET ORAL at 16:42

## 2022-03-01 RX ADMIN — DILTIAZEM HYDROCHLORIDE 30 MG: 30 TABLET, FILM COATED ORAL at 22:34

## 2022-03-01 RX ADMIN — FAMOTIDINE 20 MG: 20 TABLET ORAL at 06:44

## 2022-03-01 RX ADMIN — SODIUM CHLORIDE 100 ML/HR: 9 INJECTION, SOLUTION INTRAVENOUS at 06:44

## 2022-03-01 NOTE — THERAPY TREATMENT NOTE
Patient Name: Jose Angel Us  : 1950    MRN: [id number]                              Today's Date: 3/1/2022       Admit Date: 2022    Visit Dx:     ICD-10-CM ICD-9-CM   1. Acute hypoxemic respiratory failure (HCC)  J96.01 518.81   2. COVID-19 virus infection  U07.1 079.89   3. Pneumonia due to COVID-19 virus  U07.1 480.8    J12.82 079.89   4. Intravascular volume depletion  E86.1 276.52   5. CARI (acute kidney injury) (HCC)  N17.9 584.9   6. Bilateral lower extremity edema  R60.0 782.3   7. 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    • Pneumonia due to COVID-19 virus   • CARI (acute kidney injury) (HCC)   • History of Raynaud's syndrome   • Weight loss   • Severe malnutrition (CMS/HCC)   • Nontraumatic retroperitoneal hematoma     Past Medical History:   Diagnosis Date   • Anemia    • BPH (benign prostatic hyperplasia)    • Hyperlipidemia    • Hypertension      Past Surgical History:   Procedure Laterality Date   • HERNIA REPAIR     • INSERT CENTRAL LINE AT BEDSIDE  2022        • VENA CAVA FILTER INSERTION N/A 2022    Procedure: VENA CAVA FILTER INSERTION;  Surgeon: Ligia Méndez MD;  Location: [facility] ;  Service: Vascular;  Laterality: N/A;      General Information     Row Name 22 0937          OT Time and Intention    Document Type therapy note (daily note)  -     Mode of Treatment occupational therapy; physical therapy; co-treatment  co treat to maximize benefits of therapy, pt generally very weak and poor activity tolerance.  -     Row Name 22 0937          General Information    Existing Precautions/Restrictions fall   C diff +  -[facility] Name 03/01/22 0937          Cognition    Orientation Status (Cognition) oriented to; person; place  flat affect  -[facility] Name 03/01/22 0937          Safety Issues, Functional Mobility    Impairments Affecting Function (Mobility) balance; coordination; endurance/activity tolerance; postural/trunk control; strength; range of motion (ROM)  -           User Key  (r) = Recorded By, (t) = Taken By, (c) = Cosigned By    Initials Name Provider Type     Soniya Bonner OT Occupational Therapist                 Mobility/ADL's     Row Name 03/01/22 0938          Bed Mobility    Supine-Sit [place] (Bed Mobility) verbal cues; nonverbal cues (demo/gesture); moderate assist (50% patient effort)  -     Sit-Supine [place] (Bed Mobility) moderate assist (50% patient effort); verbal cues; nonverbal cues (demo/gesture)  -     Assistive Device (Bed Mobility) bed rails; head of bed elevated  -     Comment (Bed Mobility) needs extra time and cues for safe technique  -[facility] Name 03/01/22 0938          Transfers    Comment (Transfers) X1 partial stand with max AX2 ~ 5 seconds.  -     Sit-Stand [place] (Transfers) maximum assist (25% patient effort); 2 person assist; verbal cues; nonverbal cues (demo/gesture)  -[facility] Name 03/01/22 0938          Sit-Stand Transfer    Assistive Device (Sit-Stand Transfers) --  HHAX2.  -[facility] Name 03/01/22 0938          Grooming Assessment/Training    [place] Level (Grooming) grooming skills; moderate assist (50% patient effort); wash face, hands  -     Comment (Grooming) completed after UE ther ex and fatiqued with overhead reaching.  -[facility] Name 03/01/22 0938          Toileting Assessment/Training    [place] Level (Toileting) dependent (less than 25% patient effort)  -     Comment (Toileting) pt had incontinent BM during session X2 when sitting EOB. OT/PT returned to supine to change with total A. RN and nsg aid present  at end of encounter to finishing and complete wound care.  -           User Key  (r) = Recorded By, (t) = Taken By, (c) = Cosigned By    Initials Name Provider Type    Soniya Payne OT Occupational Therapist               Obj/Interventions     Row Name 03/01/22 0941          Shoulder (Therapeutic Exercise)    Shoulder AROM (Therapeutic Exercise) bilateral; flexion; extension; scapular retraction  forward press, rows, 10 reps with rest breaks. Needs cues for full ROM. Fatiqued after 10 reps.  -     Row Name 03/01/22 0941          Balance    Static Sitting Balance WFL; sitting, edge of bed  SBA  -     Dynamic Sitting Balance mild impairment; sitting, edge of bed  CGA, cues for posterior lean  -     Static Standing Balance moderate impairment  -     Comment, Balance Sat EOB this date 17 minutes working on UE exercises with OT and brief ADL. Sitting balance CGA/SBA. Needs cues for upright posture.  -     Row Name 03/01/22 0941          Therapeutic Exercise    Therapeutic Exercise --  UE strengthening down on elbows and pushing self back up to sitting X5 reps each side.  -           User Key  (r) = Recorded By, (t) = Taken By, (c) = Cosigned By    Initials Name Provider Type    Soniya Payne OT Occupational Therapist               Goals/Plan    No documentation.                Clinical Impression     [facility] Name 03/01/22 0949          Pain Scale: Numbers Pre/Post-Treatment    Pre/Posttreatment Pain Comment c/o of pain in L foot- unrated  -[facility] Name 03/01/22 0949          Pain Scale: FACES Pre/Post-Treatment    Pain: FACES Scale, Pretreatment 4-->hurts little more  -     Posttreatment Pain Rating 4-->hurts little more  -     Row Name 03/01/22 0949          Plan of Care Review    Plan of Care Reviewed With patient  -     Outcome Summary Pt seen today by OT, co treat with PT to progress mobility. OT worked on UE strengthening/AROM exercises, sitting tolerance, EOB ADL. Pt remains very  weak, frail, poor activity tolerance. He does sit extended periord of time at EOB today and attempted X1 partial stand with max AX2. Pt has BM during therapy session and returned to bed with total A to for toilet hygiene. Nsg in room at end of encounter to assist. Recommend dc to SNF with continued OT to increase pt independence.  -     Row Name 03/01/22 0949          Therapy Plan Review/Discharge Plan (OT)    Anticipated Discharge Disposition (OT) skilled nursing facility  -     Row Name 03/01/22 0949          Vital Signs    Pre SpO2 (%) 96  -SM     O2 Delivery Pre Treatment room air  -SM     Post SpO2 (%) 95  -SM     O2 Delivery Post Treatment room air  -     Row Name 03/01/22 0949          Positioning and Restraints    Pre-Treatment Position in bed  -SM     Post Treatment Position bed  -SM     In Bed fowlers; with nsg  -SM           User Key  (r) = Recorded By, (t) = Taken By, (c) = Cosigned By    Initials Name Provider Type    Soniya Payne OT Occupational Therapist               Outcome Measures     Row Name 03/01/22 0952          How much help from another is currently needed...    Putting on and taking off regular lower body clothing? 1  -SM     Bathing (including washing, rinsing, and drying) 2  -SM     Toileting (which includes using toilet bed pan or urinal) 1  -SM     Putting on and taking off regular upper body clothing 2  -SM     Taking care of personal grooming (such as brushing teeth) 3  -SM     Eating meals 3  -SM     AM-PAC 6 Clicks Score (OT) 12  -SM     Row Name 03/01/22 0952          Functional Assessment    Outcome Measure Options AM-PAC 6 Clicks Daily Activity (OT)  -SM           User Key  (r) = Recorded By, (t) = Taken By, (c) = Cosigned By    Initials Name Provider Type    Soniya Payne OT Occupational Therapist                Occupational Therapy Education                 Title: PT OT SLP Therapies (In Progress)     Topic: Occupational Therapy (Done)     Point: ADL  training (Done)     Description:   Instruct learner(s) on proper safety adaptation and remediation techniques during self care or transfers.   Instruct in proper use of assistive devices.              Learning Progress Summary           Patient Acceptance, E,TB, VU by CB at 2/5/2022 0423   Family Acceptance, E, VU by AG at 2/3/2022 1638      Show all documentation for this point (2)                 Point: Home exercise program (Done)     Description:   Instruct learner(s) on appropriate technique for monitoring, assisting and/or progressing therapeutic exercises/activities.              Learning Progress Summary           Patient Acceptance, E,TB, VU by CB at 2/5/2022 0423   Family Acceptance, E, VU by AG at 2/3/2022 1638      Show all documentation for this point (1)                 Point: Precautions (Done)     Description:   Instruct learner(s) on prescribed precautions during self-care and functional transfers.              Learning Progress Summary           Patient Acceptance, E,TB, VU by  at 2/5/2022 0423   Family Acceptance, E, VU by AG at 2/3/2022 1638      Show all documentation for this point (1)                 Point: Body mechanics (Done)     Description:   Instruct learner(s) on proper positioning and spine alignment during self-care, functional mobility activities and/or exercises.              Learning Progress Summary           Patient Acceptance, E,TB, VU by  at 2/5/2022 0423   Family Acceptance, E, VU by  at 2/3/2022 1638      Show all documentation for this point (2)                             User Key     Initials Effective Dates Name Provider Type Discipline     06/16/21 -  Loan Holliday, RN Registered Nurse Nurse     07/28/21 -  Maycol Benavidez, RN Registered Nurse Nurse              OT Recommendation and Plan     Plan of Care Review  Plan of Care Reviewed With: patient  Outcome Summary: Pt seen today by OT, co treat with PT to progress mobility. OT worked on UE  strengthening/AROM exercises, sitting tolerance, EOB ADL. Pt remains very weak, frail, poor activity tolerance. He does sit extended periord of time at EOB today and attempted X1 partial stand with max AX2. Pt has BM during therapy session and returned to bed with total A to for toilet hygiene. Nsg in room at end of encounter to assist. Recommend dc to SNF with continued OT to increase pt independence.     Time Calculation:    Time Calculation- OT     Row Name 03/01/22 0953             Time Calculation- OT    OT Start Time 0903  -      OT Stop Time 0934  -SM      OT Time Calculation (min) 31 min  -SM      Total Timed Code Minutes- OT 31 minute(s)  -SM      OT Received On 03/01/22  -              Timed Charges    75118 - OT Therapeutic Exercise Minutes 11  -SM      26701 - OT Therapeutic Activity Minutes 17  -SM              Total Minutes    Timed Charges Total Minutes 28  -SM       Total Minutes 28  -SM            User Key  (r) = Recorded By, (t) = Taken By, (c) = Cosigned By    Initials Name Provider Type     Soniya Bonner OT Occupational Therapist              Therapy Charges for Today     Code Description Service Date Service Provider Modifiers Qty    57904394850  OT THERAPEUTIC ACT EA 15 MIN 3/1/2022 Soniya Bonner OT GO 1    18499462243  OT THER PROC EA 15 MIN 3/1/2022 Soniya Bonner OT GO 1               Soniya Bonner OT  3/1/2022

## 2022-03-01 NOTE — THERAPY TREATMENT NOTE
Patient Name: Jose Angel Us  : 1950    MRN: [id number]                              Today's Date: 3/1/2022       Admit Date: 2022    Visit Dx:     ICD-10-CM ICD-9-CM   1. Acute hypoxemic respiratory failure (HCC)  J96.01 518.81   2. COVID-19 virus infection  U07.1 079.89   3. Pneumonia due to COVID-19 virus  U07.1 480.8    J12.82 079.89   4. Intravascular volume depletion  E86.1 276.52   5. CARI (acute kidney injury) (HCC)  N17.9 584.9   6. Bilateral lower extremity edema  R60.0 782.3   7. 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    • Pneumonia due to COVID-19 virus   • CARI (acute kidney injury) (HCC)   • History of Raynaud's syndrome   • Weight loss   • Severe malnutrition (CMS/HCC)   • Nontraumatic retroperitoneal hematoma     Past Medical History:   Diagnosis Date   • Anemia    • BPH (benign prostatic hyperplasia)    • Hyperlipidemia    • Hypertension      Past Surgical History:   Procedure Laterality Date   • HERNIA REPAIR     • INSERT CENTRAL LINE AT BEDSIDE  2022        • VENA CAVA FILTER INSERTION N/A 2022    Procedure: VENA CAVA FILTER INSERTION;  Surgeon: Ligia Méndez MD;  Location: [facility] ;  Service: Vascular;  Laterality: N/A;      General Information     Row Name 22 1308          Physical Therapy Time and Intention    Document Type therapy note (daily note)  -DOMINGO     Mode of Treatment occupational therapy; physical therapy; co-treatment  co treat to maximize benefits of therapy, pt generally very weak and poor activity tolerance.  -     Row Name 22 1308          General Information    Existing  Precautions/Restrictions fall  C diff +  -     Row Name 03/01/22 1302          Cognition    Orientation Status (Cognition) oriented to; person; place  flat affect  -     Row Name 03/01/22 1302          Safety Issues, Functional Mobility    Impairments Affecting Function (Mobility) balance; coordination; endurance/activity tolerance; postural/trunk control; strength; range of motion (ROM)  -           User Key  (r) = Recorded By, (t) = Taken By, (c) = Cosigned By    Initials Name Provider Type    Priya Duvall PTA Physical Therapy Assistant               Mobility     Row Name 03/01/22 1302          Bed Mobility    Supine-Sit [place] (Bed Mobility) verbal cues; nonverbal cues (demo/gesture); moderate assist (50% patient effort)  -     Sit-Supine [place] (Bed Mobility) moderate assist (50% patient effort); verbal cues; nonverbal cues (demo/gesture)  -     Assistive Device (Bed Mobility) bed rails; head of bed elevated  -     Row Name 03/01/22 1302          Sit-Stand Transfer    Sit-Stand [place] (Transfers) maximum assist (25% patient effort); 2 person assist; verbal cues; nonverbal cues (demo/gesture)  -     Assistive Device (Sit-Stand Transfers) --  HHAX2.  -     Row Name 03/01/22 1302          Gait/Stairs (Locomotion)    Distance in Feet (Gait) one side step toward HOB, incr pain w/WB L foot  -           User Key  (r) = Recorded By, (t) = Taken By, (c) = Cosigned By    Initials Name Provider Type    Priya Duvall PTA Physical Therapy Assistant               Obj/Interventions     Row Name 03/01/22 1303          Motor Skills    Therapeutic Exercise --  LAQsx10 reps,  assist on L  -           User Key  (r) = Recorded By, (t) = Taken By, (c) = Cosigned By    Initials Name Provider Type    Priya Duvall PTA Physical Therapy Assistant               Goals/Plan    No documentation.                Clinical Impression     Row Name 03/01/22 1304          Pain     Additional Documentation Pain Scale: FACES Pre/Post-Treatment (Group)  -     Row Name 03/01/22 1304          Pain Scale: FACES Pre/Post-Treatment    Pain: FACES Scale, Pretreatment 4-->hurts little more  -JM     Posttreatment Pain Rating 4-->hurts little more  -JM     Pain Location - Side Left  -     Pain Location foot  -     Row Name 03/01/22 1304          Plan of Care Review    Plan of Care Reviewed With patient  -     Outcome Summary Pt agreed to PT/OT rx; pt fatigues quickly and req rest breaks to complete a task; pt able to stand x1 and take shuffle step toward HOB w/MAX 2; pt did mariusz EOB exer , added in elbow push-ups today x5 reps; plans SNU at [place], too weak for fam to assist currently; pt had BM during session, initiated clean-up, but called nsg to assist as BM was on sacral wound bandage-nsg finished clean-up  -     Row Name 03/01/22 1304          Therapy Assessment/Plan (PT)    Rehab Potential (PT) fair, will monitor progress closely  -     Criteria for Skilled Interventions Met (PT) yes  -     Row Name 03/01/22 1304          Vital Signs    O2 Delivery Pre Treatment room air  -     Row Name 03/01/22 1304          Positioning and Restraints    Pre-Treatment Position in bed  -     Post Treatment Position bed  -JM     In Bed side lying left; with nsg  -           User Key  (r) = Recorded By, (t) = Taken By, (c) = Cosigned By    Initials Name Provider Type    Priya Duvall PTA Physical Therapy Assistant               Outcome Measures     Row Name 03/01/22 1308          How much help from another person do you currently need...    Turning from your back to your side while in flat bed without using bedrails? 3  -JM     Moving from lying on back to sitting on the side of a flat bed without bedrails? 2  -JM     Moving to and from a bed to a chair (including a wheelchair)? 1  -JM     Standing up from a chair using your arms (e.g., wheelchair, bedside chair)? 2  -JM     Climbing 3-5 steps  with a railing? 1  -     To walk in hospital room? 1  -     AM-PAC 6 Clicks Score (PT) 10  -     Row Name 03/01/22 0952          Functional Assessment    Outcome Measure Options AM-PAC 6 Clicks Daily Activity (OT)  -           User Key  (r) = Recorded By, (t) = Taken By, (c) = Cosigned By    Initials Name Provider Type    Priya Duvall PTA Physical Therapy Assistant    Soniya Payne OT Occupational Therapist                             Physical Therapy Education                 Title: PT OT SLP Therapies (In Progress)     Topic: Physical Therapy (In Progress)     Point: Mobility training (In Progress)     Learning Progress Summary           Patient Acceptance, E,D, NR by  at 3/1/2022 1309   Family Acceptance, E, VU by  at 2/3/2022 1638      Show all documentation for this point (11)                 Point: Home exercise program (In Progress)     Learning Progress Summary           Patient Acceptance, E,D, NR by  at 3/1/2022 1309   Family Acceptance, E, VU by  at 2/3/2022 1638      Show all documentation for this point (8)                 Point: Body mechanics (In Progress)     Learning Progress Summary           Patient Acceptance, E,D, NR by  at 3/1/2022 1309   Family Acceptance, E, VU by  at 2/3/2022 1638      Show all documentation for this point (9)                 Point: Precautions (In Progress)     Learning Progress Summary           Patient Acceptance, E,D, NR by  at 3/1/2022 1309   Family Acceptance, E, VU by  at 2/3/2022 1638      Show all documentation for this point (9)                             User Key     Initials Effective Dates Name Provider Type [facility] 03/07/18 -  Priya Milner PTA Physical Therapy Assistant PT     06/16/21 -  Loan Holliday, RN Registered Nurse Nurse              PT Recommendation and Plan     Plan of Care Reviewed With: patient  Outcome Summary: Pt agreed to PT/OT rx; pt fatigues quickly and req rest breaks to  complete a task; pt able to stand x1 and take shuffle step toward HOB w/MAX 2; pt did mariusz EOB exer , added in elbow push-ups today x5 reps; plans SNU at [place], too weak for fam to assist currently; pt had BM during session, initiated clean-up, but called nsg to assist as BM was on sacral wound bandage-nsg finished clean-up     Time Calculation:    PT Charges     Row Name 03/01/22 1310             Time Calculation    Start Time 0900  -      Stop Time 0934  -      Time Calculation (min) 34 min  -      PT Received On 03/01/22  -DOMINGO      PT - Next Appointment 03/03/22  -DOMINGO            User Key  (r) = Recorded By, (t) = Taken By, (c) = Cosigned By    Initials Name Provider Type    Priya Duvall PTA Physical Therapy Assistant              Therapy Charges for Today     Code Description Service Date Service Provider Modifiers Qty    08669323578 HC PT THER PROC EA 15 MIN 3/1/2022 Priya Milner PTA GP 2    78260704737 HC PT THER SUPP EA 15 MIN 3/1/2022 Priya Milner PTA GP 2          PT G-Codes  Outcome Measure Options: AM-PAC 6 Clicks Daily Activity (OT)  AM-PAC 6 Clicks Score (PT): 10  AM-PAC 6 Clicks Score (OT): 12  Modified [place] Scale: 4 - Moderately severe disability.  Unable to walk without assistance, and unable to attend to own bodily needs without assistance.    Priya Milner PTA  3/1/2022

## 2022-03-01 NOTE — PLAN OF CARE
Goal Outcome Evaluation:  Plan of Care Reviewed With: patient           Outcome Summary: Pt seen today by OT, co treat with PT to progress mobility. OT worked on UE strengthening/AROM exercises, sitting tolerance, EOB ADL. Pt remains very weak, frail, poor activity tolerance. He does sit extended periord of time at EOB today and attempted X1 partial stand with max AX2. Pt has BM during therapy session and returned to bed with total A to for toilet hygiene. Nsg in room at end of encounter to assist. Recommend dc to SNF with continued OT to increase pt independence.    OT wore mask, eye protection, gloves, gown, and completed hand hygiene. Pt wore a mask.

## 2022-03-01 NOTE — PLAN OF CARE
Goal Outcome Evaluation:           Progress: no change  Outcome Summary: A&Ox4. RA. Wound care completed per order. NS @ 100 mL/hr. PO vanc given. Catheter care completed. Good UO. No complaints this shift. VSS. Will continue to monitor.

## 2022-03-01 NOTE — PLAN OF CARE
Goal Outcome Evaluation:  Plan of Care Reviewed With: patient           Outcome Summary: Pt agreed to PT/OT rx; pt fatigues quickly and req rest breaks to complete a task; pt able to stand x1 and take shuffle step toward HOB w/MAX 2; pt did mariusz EOB exer , added in elbow push-ups today x5 reps; plans SNU at [place], too weak for fam to assist currently; pt had BM during session, initiated clean-up, but called nsg to assist as BM was on sacral wound bandage-nsg finished clean-up    Patient was wearing a face mask during this therapy encounter. Therapist used appropriate personal protective equipment including eye protection, mask, and gloves.  Mask used was standard procedure mask. Appropriate PPE was worn during the entire therapy session. Hand hygiene was completed before and after therapy session. Patient is not in enhanced droplet precautions.

## 2022-03-01 NOTE — PROGRESS NOTES
.      Name: Jose Angel Us ADMIT: 2022   : 1950  PCP: Tarik Willis APRN    MRN: [id number] LOS: 27 days   AGE/SEX: 71 y.o. male  ROOM: [facility]     Subjective   Subjective   Patient seen this morning. No acute events overnight.  Continues to have diarrhea, had 2 bowel movements overnight per patient.  States he feels about the same.  Tired.  Denies abdominal pain, nausea, vomiting.    Review of Systems   As above  Objective   Objective   Vital Signs  Temp:  [98.3 °F (36.8 °C)-99.6 °F (37.6 °C)] 98.3 °F (36.8 °C)  Heart Rate:  [] 80  Resp:  [16] 16  BP: (116-130)/(64-78) 116/64  SpO2:  [93 %-96 %] 93 %  on   ;   Device (Oxygen Therapy): room air  Body mass index is 17.24 kg/m².  Physical Exam  Constitutional:       Appearance: Alert, chronically ill-appearing.  HENT:      Head: Normocephalic and atraumatic.   Cardiovascular:      Rate and Rhythm: Normal rate and regular rhythm.      Heart sounds: No murmur heard.  No friction rub.   Pulmonary:      Effort: Pulmonary effort is normal.      Breath sounds: Normal breath sounds.   Abdominal:      General: Bowel sounds are normal. There is no distension.      Palpations: Abdomen is soft.      Tenderness: There is no abdominal tenderness.   Musculoskeletal:      Comments: Left lower extremity wrapped.  Skin:     General: Skin is warm and dry.   Neurological:      Mental Status: He is alert.   Psychiatric:         Mood and Affect: Mood normal.         Behavior: Behavior normal.   Results Review     I reviewed the patient's new clinical results.  Results from last 7 days   Lab Units 22  0516 22  0546 22  0956 22  0554   WBC 10*3/mm3 15.26* 11.46* 6.85 7.97   HEMOGLOBIN g/dL 9.5* 10.4* 9.8* 10.0*   PLATELETS 10*3/mm3 516* 494* 452* 302     Results from last 7 days   Lab Units 22  0516 22  0546 22  0139 22  0559   SODIUM mmol/L 135* 135* 134* 137   POTASSIUM mmol/L 3.8 4.2 4.1 4.4   CHLORIDE mmol/L 103 104 102 103    CO2 mmol/L 24.0 21.9* 24.0 21.7*   BUN mg/dL 17 22 26* 23   CREATININE mg/dL 0.63* 0.80 0.85 0.83   GLUCOSE mg/dL 97 89 105* 71   Estimated Creatinine Clearance: 59.3 mL/min (A) (by C-G formula based on SCr of 0.63 mg/dL (L)).  Results from last 7 days   Lab Units 03/01/22  0516 02/28/22  0546 02/27/22  0139 02/26/22  0559   ALBUMIN g/dL 1.80* 1.40* 2.00* 1.80*   BILIRUBIN mg/dL 0.5 0.5  --   --    ALK PHOS U/L 91 106  --   --    AST (SGOT) U/L 14 22  --   --    ALT (SGPT) U/L 12 15  --   --      Results from last 7 days   Lab Units 03/01/22  0516 02/28/22  0546 02/27/22  0139 02/26/22  0559   CALCIUM mg/dL 7.4* 7.8* 7.6* 8.0*   ALBUMIN g/dL 1.80* 1.40* 2.00* 1.80*   MAGNESIUM mg/dL 1.8 1.8 1.8  --    PHOSPHORUS mg/dL 2.7 1.8* 2.3* 2.9     Results from last 7 days   Lab Units 03/01/22  0516   PROCALCITONIN ng/mL 0.22     COVID19   Date Value Ref Range Status   02/02/2022 Detected (C) Not Detected - Ref. Range Final     No results found for: HGBA1C, POCGLU    XR Chest Post CVA Port  Narrative: XR CHEST POST CVA PORT-     INDICATIONS: Central line placement     TECHNIQUE: Frontal view of the chest     COMPARISON: 02/07/2022     FINDINGS:     Previous right IJ catheter is no longer seen. Left IJ catheter extends  to the superior vena cava. The heart size is normal. Aorta appears  tortuous. Pulmonary vasculature is unremarkable. Bilateral basilar  infiltrates/atelectasis are apparent, as well as mild bilateral pleural  effusions. No pneumothorax. Otherwise stable.     Impression:    Central line placement. No pneumothorax. Persistent bilateral basilar  infiltrates/atelectasis, mild pleural effusions.     This report was finalized on 2/13/2022 10:00 AM by Dr. Melvin Vela M.D.     CT Abdomen Pelvis Without Contrast  Addendum: ADDENDUM:   02 13 22 06:18 Verify Receipt Verified receipt with MARIE Williamson on 02 13 06:   18 (-05:00)  Narrative: CR  Patient: CRISTÓBAL CORTEZ  Time Out: 05:40  Exam(s): CT ABDOMEN + PELVIS  Without Contrast     EXAM:    CT Abdomen and Pelvis Without Intravenous Contrast    CLINICAL HISTORY:    Abdominal pain.    TECHNIQUE:    Axial computed tomography images of the abdomen and pelvis without   intravenous contrast.  CTDI is 15.1 mGy and DLP is 804.9 mGy-cm.  This CT   exam was performed according to the principle of ALARA (As Low As   Reasonably Achievable) by using one or more of the following dose   reduction techniques: automated exposure control, adjustment of the mA   and or kV according to patient size, and or use of iterative   reconstruction technique.    COMPARISON:    CT abdomen pelvis 2 8 2022    FINDINGS:    Lung bases:  Unremarkable.  No mass.  No consolidation.    Pleural space:  Moderate bilateral pleural effusions.  Airspace   opacities of the right lower and middle lobes and to a lesser degree the   left lower lobe are most concerning for atypical infection.     ABDOMEN:    Liver:  Unremarkable.    Gallbladder and bile ducts:  Question gallbladder sludge.  No calcified   stones.  No ductal dilation.    Pancreas:  Unremarkable.  No ductal dilation.    Spleen:  Unremarkable.  No splenomegaly.    Adrenals:  Unremarkable.  No mass.    Kidneys and ureters:  Nonobstructing 4 mm left renal calculus.  No   hydronephrosis of either kidney.    Stomach and bowel:  Diverticulosis.  No obstruction.  No mucosal   thickening.     PELVIS:    Appendix:  No findings to suggest acute appendicitis.    Bladder:  The urinary bladder is decompressed secondary to a Santana   catheter.  No stones.    Reproductive:  Unremarkable as visualized.     ABDOMEN and PELVIS:    Intraperitoneal space:  Unremarkable.  No free air.  No significant   fluid collection.    Bones joints:  There are degenerative changes spine.  No fracture.    Soft tissues:  Hematomas of the left iliopsoas muscle measures 4.8 x 5.  7 x 21.2 cm.  An additional hemorrhage of the iliac is muscle measured 8.  0 x 4.5 x 17.5 cm.  There is a moderate  amount of blood in the left   abdomen extending from these hematomas.  There is diffuse body wall edema.      Vasculature:  There is an IVC filter.  Mild atherosclerosis.  No   aneurysm.    Lymph nodes:  Unremarkable.  No enlarged lymph nodes.    IMPRESSION:       1.  Hematomas of the left iliopsoas muscle measures 4.8 x 5.7 x 21.2 cm.    An additional hemorrhage of the iliac is muscle measured 8.0 x 4.5 x 17.5   cm.  There is a moderate amount of blood in the left abdomen extending   from these hematomas.  2.  Moderate bilateral pleural effusions.  Airspace opacities of the   right lower and middle lobes and to a lesser degree the left lower lobe   are most concerning for atypical infection.  3.  Diverticulosis.  4.  There is diffuse body wall edema.  5.  Question gallbladder sludge.  6.  Nonobstructing 4 mm left renal calculus.  No hydronephrosis of either   kidney.  Impression: Communications:     Verify Receipt     Electronically signed by Liset Shi MD on 02-13-22 at 0540    Scheduled Medications  cholestyramine, 1 packet, Oral, BID  collagenase, 1 application, Topical, BID  dilTIAZem, 30 mg, Oral, Q8H  famotidine, 20 mg, Oral, BID AC  vancomycin, 125 mg, Oral, Q6H    Infusions  Pharmacy Consult,     Diet  Diet Regular; Thin       Assessment/Plan     Active Hospital Problems    Diagnosis  POA   • Nontraumatic retroperitoneal hematoma [K66.1]  No   • Severe malnutrition (CMS/HCC) [E43]  Yes   • Acute hypoxemic respiratory failure (HCC) [J96.01]  Yes   • Acute deep vein thrombosis (DVT) of left lower extremity  [I82.402]  Yes   • 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       Mr. Us is a 71 year old male who presented from home with his sister for generalized weakness, cough and decreased warmth and color in his LLE.  He was noted to be COVID19 positive as well as found to have an acute DVT of the LLE with thrombosis of the artery as well. He was placed on heparin therapy and initially on telemetry. Unfortunately, he developed hemorrhagic shock the evening of 2/5 and was transferred to the ICU 2/6 for initial suspected GIB. He was eventually found to have a retroperitoneal bleed and vascular placed IVC filter on 2/8 with vascular surgery. GI was consulted initially for GI bleeding, but signed off after CT A/P revealed retroperitoneal bleed as the source. Hematology also followed at one time given his anemia and thrombocytopenia. This was all felt related to blood loss from the hematoma and HIT antibody was negative. He was transferred back to telemetry 2/11 but again developed bleeding and hemodynamically instability on 2/13 prompting being sent back to the ICU. He was found to have repeat hemorrhage as well as new lung infiltrate. He was given antibiotics for suspected secondary pneumonia and more blood for his bleeding. He was again stabilized and sent back to telemetry on 2/15. His stay was complicated by CARI, PAF as well as urinary retention. Vascular recommended amputation of his left lower extremity for his gangrene, but patient refuses.    Leukocytosis: Patient [place] count has been increasing for the last 2 days.  15.26 03/01.Procalcitonicn 0.22.  Afebrile.  On p.o. vancomycin for C. difficile.  ID consulted.  COVID-19 pneumonia with acute hypoxic respiratory failure: Hypoxemia resolved.   Left foot gangrene/Tibial Occlusion: On presentation.  Vascular surgery input appreciated.  Amputation recommended.  Patient declines  Acute DVT left popliteal peroneal soleal: On presentation.  Status post IVC filter  2/8  Retroperitoneal hematoma/hemorrhagic shock: Initial event associated anticoagulation.  Status post IVC filter.   Anemia-acute blood loss anemia-iron deficiency-anemia chronic disease: Status post transfusion 6 units packed RBCs.  Status post ferric gluconate infusion.  Hemoglobin  stable.  Thrombocytopenia: HIT antibody was negative.  Consumptive.  Resolved.  Transaminitis: Resolved.  Urinary retention/clot: Santana was replaced for retention.  Urology saw during the hospitalization, f/u with urology outpatient  PAF:   Now in NSR. No A/C candidate due to retroperitoneal hematoma.. cardiology following.  If arrhythmias persist would choose Cardizem per cards.   Elevated troponin: Troponin stable.  0.136  02/28. down from prior.  Cardiology following.  CARI on CKD 3: Secondary to prerenal/ATN/sepsis/retention.  Resolved.  Nephrology input appreciated.  C Diff Diarrhea-Clostridum difficile positive .  Started on oral vancomycin diarrhea , cholestyramine.  Still having loose bowels per day, but overall improved compared to prior        CODE STATUS: Full code  DVT prophylaxis: SCDs, patient had retroperitoneal hematoma  Disposition: SNF when medically stable.  Not medically ready to be discharged. VALERIE Sullivan MD  [facility]  03/01/22  16:56 EST

## 2022-03-01 NOTE — PLAN OF CARE
Goal Outcome Evaluation:      VSS on room air, SR/ST through night-low 100's, wound care, generalized weakness-not motivated to move, valladares cath patent, IVF, several BM's through night

## 2022-03-01 NOTE — PROGRESS NOTES
"    Patient Name: Jose Angel Us  :1950  71 y.o.      Patient Care Team:  Tarik Willis APRN as PCP - General (Internal Medicine)    Chief Complaint: follow up atrial tachycardia, paroxysmal atrial fibrillation     Interval History: he is resting in bed. Feels well. No CP, SOA, palpitations. Remains SR 90's. Reviewed tele. Had 2 short runs of atrial tach.        Objective   Vital Signs  Temp:  [98.1 °F (36.7 °C)-99.6 °F (37.6 °C)] 98.3 °F (36.8 °C)  Heart Rate:  [] 80  Resp:  [16] 16  BP: (116-130)/(64-78) 116/64    Intake/Output Summary (Last 24 hours) at 3/1/2022 1235  Last data filed at 3/1/2022 1206  Gross per 24 hour   Intake 3050 ml   Output 3050 ml   Net 0 ml     Flowsheet Rows      First Filed Value   Admission Height 177.8 cm (70\") Documented at 2022 1025   Admission Weight 59 kg (130 lb) Documented at 2022 1025          Physical Exam:   General Appearance:    Alert, cooperative, in no acute distress   Lungs:     Clear to auscultation.  Normal respiratory effort and rate.      Heart:    Regular rhythm and normal rate, normal S1 and S2, no murmurs, gallops or rubs.     Chest Wall:    No abnormalities observed   Abdomen:     Soft, nontender, positive bowel sounds.     Extremities:   no cyanosis, clubbing or edema.  No marked joint deformities.  Adequate musculoskeletal strength.       Results Review:    Results from last 7 days   Lab Units 22  0516   SODIUM mmol/L 135*   POTASSIUM mmol/L 3.8   CHLORIDE mmol/L 103   CO2 mmol/L 24.0   BUN mg/dL 17   CREATININE mg/dL 0.63*   GLUCOSE mg/dL 97   CALCIUM mg/dL 7.4*     Results from last 7 days   Lab Units 22  0546 22  1535 22  0139   TROPONIN T ng/mL 0.136* 0.151* 0.131*     Results from last 7 days   Lab Units 22  0516   WBC 10*3/mm3 15.26*   HEMOGLOBIN g/dL 9.5*   HEMATOCRIT % 30.3*   PLATELETS 10*3/mm3 516*         Results from last 7 days   Lab Units 22  0516   MAGNESIUM mg/dL 1.8                 "   Medication Review:   cholestyramine, 1 packet, Oral, BID  collagenase, 1 application, Topical, BID  dilTIAZem, 30 mg, Oral, Q8H  famotidine, 20 mg, Oral, BID AC  vancomycin, 125 mg, Oral, Q6H         Pharmacy Consult,         Assessment/Plan   1. Nonsustained atrial tachycardia and paroxysmal atrial fibrillation -- nothing sustained, he cannot be anticoagulated due to massive retroperitoneal bleed. Given his history of Raynaud's, low dose diltiazem added. Tolerating this well. Seems to be having less frequent runs of ectopy. Continue same today. Blood pressure tolerating. Consider changing to long acting tomorrow.     2. Elevated Tn -- no trend, curve is flat, no angina. He is not a candidate for further ischemic testing at this time. Consider aspirin if possible.      3. C diff     4. TALIA Phelps  [facility]  03/01/22  12:35 EST

## 2022-03-02 ENCOUNTER — APPOINTMENT (OUTPATIENT)
Dept: GENERAL RADIOLOGY | Facility: HOSPITAL | Age: 72
End: 2022-03-02

## 2022-03-02 LAB
ALBUMIN SERPL-MCNC: 1.9 G/DL (ref 3.5–5.2)
ALBUMIN/GLOB SERPL: 0.7 G/DL
ALP SERPL-CCNC: 94 U/L (ref 39–117)
ALT SERPL W P-5'-P-CCNC: 12 U/L (ref 1–41)
ANION GAP SERPL CALCULATED.3IONS-SCNC: 7.1 MMOL/L (ref 5–15)
AST SERPL-CCNC: 17 U/L (ref 1–40)
BILIRUB SERPL-MCNC: 0.5 MG/DL (ref 0–1.2)
BUN SERPL-MCNC: 15 MG/DL (ref 8–23)
BUN/CREAT SERPL: 19.5 (ref 7–25)
CALCIUM SPEC-SCNC: 8.1 MG/DL (ref 8.6–10.5)
CHLORIDE SERPL-SCNC: 104 MMOL/L (ref 98–107)
CO2 SERPL-SCNC: 22.9 MMOL/L (ref 22–29)
CREAT SERPL-MCNC: 0.77 MG/DL (ref 0.76–1.27)
DEPRECATED RDW RBC AUTO: 51.3 FL (ref 37–54)
EGFRCR SERPLBLD CKD-EPI 2021: 94.5 ML/MIN/1.73
EOSINOPHIL # BLD MANUAL: 0.2 10*3/MM3 (ref 0–0.4)
EOSINOPHIL NFR BLD MANUAL: 1 % (ref 0.3–6.2)
ERYTHROCYTE [DISTWIDTH] IN BLOOD BY AUTOMATED COUNT: 15.4 % (ref 12.3–15.4)
GLOBULIN UR ELPH-MCNC: 2.9 GM/DL
GLUCOSE SERPL-MCNC: 94 MG/DL (ref 65–99)
HCT VFR BLD AUTO: 29.6 % (ref 37.5–51)
HGB BLD-MCNC: 9.3 G/DL (ref 13–17.7)
LYMPHOCYTES # BLD MANUAL: 2.36 10*3/MM3 (ref 0.7–3.1)
LYMPHOCYTES NFR BLD MANUAL: 9 % (ref 5–12)
MAGNESIUM SERPL-MCNC: 1.9 MG/DL (ref 1.6–2.4)
MCH RBC QN AUTO: 28.9 PG (ref 26.6–33)
MCHC RBC AUTO-ENTMCNC: 31.4 G/DL (ref 31.5–35.7)
MCV RBC AUTO: 91.9 FL (ref 79–97)
METAMYELOCYTES NFR BLD MANUAL: 2 % (ref 0–0)
MONOCYTES # BLD: 1.77 10*3/MM3 (ref 0.1–0.9)
MYELOCYTES NFR BLD MANUAL: 6 % (ref 0–0)
NEUTROPHILS # BLD AUTO: 13.78 10*3/MM3 (ref 1.7–7)
NEUTROPHILS NFR BLD MANUAL: 70 % (ref 42.7–76)
PHOSPHATE SERPL-MCNC: 2.8 MG/DL (ref 2.5–4.5)
PLAT MORPH BLD: NORMAL
PLATELET # BLD AUTO: 497 10*3/MM3 (ref 140–450)
PMV BLD AUTO: 9.5 FL (ref 6–12)
POTASSIUM SERPL-SCNC: 4 MMOL/L (ref 3.5–5.2)
PROT SERPL-MCNC: 4.8 G/DL (ref 6–8.5)
RBC # BLD AUTO: 3.22 10*6/MM3 (ref 4.14–5.8)
RBC MORPH BLD: NORMAL
SODIUM SERPL-SCNC: 134 MMOL/L (ref 136–145)
VARIANT LYMPHS NFR BLD MANUAL: 12 % (ref 19.6–45.3)
WBC MORPH BLD: NORMAL
WBC NRBC COR # BLD: 19.68 10*3/MM3 (ref 3.4–10.8)

## 2022-03-02 PROCEDURE — 83735 ASSAY OF MAGNESIUM: CPT | Performed by: STUDENT IN AN ORGANIZED HEALTH CARE EDUCATION/TRAINING PROGRAM

## 2022-03-02 PROCEDURE — 85007 BL SMEAR W/DIFF WBC COUNT: CPT | Performed by: STUDENT IN AN ORGANIZED HEALTH CARE EDUCATION/TRAINING PROGRAM

## 2022-03-02 PROCEDURE — 84100 ASSAY OF PHOSPHORUS: CPT | Performed by: INTERNAL MEDICINE

## 2022-03-02 PROCEDURE — 99232 SBSQ HOSP IP/OBS MODERATE 35: CPT | Performed by: NURSE PRACTITIONER

## 2022-03-02 PROCEDURE — 74018 RADEX ABDOMEN 1 VIEW: CPT

## 2022-03-02 PROCEDURE — 85025 COMPLETE CBC W/AUTO DIFF WBC: CPT | Performed by: STUDENT IN AN ORGANIZED HEALTH CARE EDUCATION/TRAINING PROGRAM

## 2022-03-02 PROCEDURE — 80053 COMPREHEN METABOLIC PANEL: CPT | Performed by: STUDENT IN AN ORGANIZED HEALTH CARE EDUCATION/TRAINING PROGRAM

## 2022-03-02 RX ADMIN — METRONIDAZOLE 500 MG: 500 INJECTION, SOLUTION INTRAVENOUS at 13:20

## 2022-03-02 RX ADMIN — DILTIAZEM HYDROCHLORIDE 30 MG: 30 TABLET, FILM COATED ORAL at 21:32

## 2022-03-02 RX ADMIN — FAMOTIDINE 20 MG: 20 TABLET ORAL at 06:34

## 2022-03-02 RX ADMIN — CHOLESTYRAMINE 1 PACKET: 4 POWDER, FOR SUSPENSION ORAL at 23:38

## 2022-03-02 RX ADMIN — METRONIDAZOLE 500 MG: 500 INJECTION, SOLUTION INTRAVENOUS at 04:58

## 2022-03-02 RX ADMIN — CHOLESTYRAMINE 1 PACKET: 4 POWDER, FOR SUSPENSION ORAL at 00:13

## 2022-03-02 RX ADMIN — VANCOMYCIN HYDROCHLORIDE 125 MG: 125 CAPSULE ORAL at 09:00

## 2022-03-02 RX ADMIN — VANCOMYCIN HYDROCHLORIDE 125 MG: 125 CAPSULE ORAL at 21:32

## 2022-03-02 RX ADMIN — VANCOMYCIN HYDROCHLORIDE 125 MG: 125 CAPSULE ORAL at 14:06

## 2022-03-02 RX ADMIN — COLLAGENASE SANTYL 1 APPLICATION: 250 OINTMENT TOPICAL at 10:04

## 2022-03-02 RX ADMIN — METRONIDAZOLE 500 MG: 500 INJECTION, SOLUTION INTRAVENOUS at 21:31

## 2022-03-02 RX ADMIN — VANCOMYCIN HYDROCHLORIDE 125 MG: 125 CAPSULE ORAL at 04:58

## 2022-03-02 RX ADMIN — DILTIAZEM HYDROCHLORIDE 30 MG: 30 TABLET, FILM COATED ORAL at 13:20

## 2022-03-02 RX ADMIN — CHOLESTYRAMINE 1 PACKET: 4 POWDER, FOR SUSPENSION ORAL at 10:04

## 2022-03-02 RX ADMIN — DILTIAZEM HYDROCHLORIDE 30 MG: 30 TABLET, FILM COATED ORAL at 06:34

## 2022-03-02 RX ADMIN — FAMOTIDINE 20 MG: 20 TABLET ORAL at 16:40

## 2022-03-02 NOTE — PROGRESS NOTES
"    Patient Name: Jose Angel Us  :1950  71 y.o.      Patient Care Team:  Tarik Willis APRN as PCP - General (Internal Medicine)    Chief Complaint: follow up atrial tachycardia, paroxysmal atrial fibrillation     Interval History:  He is resting in bed. No complaints. He says they are working on discharge plans.       Objective   Vital Signs  Temp:  [97.9 °F (36.6 °C)-98.1 °F (36.7 °C)] 98.1 °F (36.7 °C)  Heart Rate:  [88-96] 88  Resp:  [16-17] 16  BP: (115-135)/(65-76) 115/67    Intake/Output Summary (Last 24 hours) at 3/2/2022 1210  Last data filed at 3/2/2022 1113  Gross per 24 hour   Intake 1297 ml   Output 1800 ml   Net -503 ml     Flowsheet Rows      First Filed Value   Admission Height 177.8 cm (70\") Documented at 2022 1025   Admission Weight 59 kg (130 lb) Documented at 2022 1025          Physical Exam:   General Appearance:    Alert, cooperative, in no acute distress   Lungs:     Clear to auscultation.  Normal respiratory effort and rate.      Heart:    Regular rhythm and normal rate, normal S1 and S2, no murmurs, gallops or rubs.     Chest Wall:    No abnormalities observed   Abdomen:     Soft, nontender, positive bowel sounds.     Extremities:   no cyanosis, clubbing or edema.  No marked joint deformities.  Adequate musculoskeletal strength.       Results Review:    Results from last 7 days   Lab Units 22  0635   SODIUM mmol/L 134*   POTASSIUM mmol/L 4.0   CHLORIDE mmol/L 104   CO2 mmol/L 22.9   BUN mg/dL 15   CREATININE mg/dL 0.77   GLUCOSE mg/dL 94   CALCIUM mg/dL 8.1*     Results from last 7 days   Lab Units 22  0546 22  1535 22  0139   TROPONIN T ng/mL 0.136* 0.151* 0.131*     Results from last 7 days   Lab Units 22  0635   WBC 10*3/mm3 19.68*   HEMOGLOBIN g/dL 9.3*   HEMATOCRIT % 29.6*   PLATELETS 10*3/mm3 497*         Results from last 7 days   Lab Units 22  0635   MAGNESIUM mg/dL 1.9                   Medication Review:   cholestyramine, 1 " packet, Oral, BID  collagenase, 1 application, Topical, BID  dilTIAZem, 30 mg, Oral, Q8H  famotidine, 20 mg, Oral, BID AC  metroNIDAZOLE, 500 mg, Intravenous, Q8H  vancomycin, 125 mg, Oral, Q6H         Pharmacy Consult,         Assessment/Plan   1. Nonsustained atrial tachycardia and paroxysmal atrial fibrillation -- nothing sustained, he cannot be anticoagulated due to massive retroperitoneal bleed. Given his history of Raynaud's, low dose diltiazem added. Tolerating this well. Seems to be having less frequent runs of ectopy. Continue same today. Blood pressure tolerating.      2. Elevated Tn -- no trend, curve is flat, no angina. He is not a candidate for further ischemic testing at this time. Consider aspirin if possible. He had two episodes of hemorrhage from retroperitoneal bleeding this admission.      3. C diff     4. PAD    Change to long acting diltiazem. Tele reviewed and he is doing well with less atrial arrhythmia.     Will check his tele/chart tomorrow and follow peripherally. Please call with questions.    TALIA Newberry  [facility]  03/02/22  12:10 EST

## 2022-03-02 NOTE — CONSULTS
CONSULT NOTE    Infectious Diseases - Donald Mann MD  [facility]       Patient Identification:  Name: Jose Angel Us  Age: 71 y.o.  Sex: male  :  1950  MRN: [id number]             Date of Consultation: 3/1/2022      Primary Care Physician: Tarik Willis APRN                               Requesting Physician: Dr. Sullivan  Reason for Consultation: C. difficile infection with increasing WBC count.    Impression: Patient 71-year-old male with past medical history as noted below including history of hypertension hyperlipidemia benign prostatic hyperplasia and BPH as well as anemia presented to the emergency room with cough weakness and shortness of breath and was found to have multifocal COVID-19 pneumonia.  Patient was noted to have purple discoloration of his toes on the left foot as well as swelling of the left foot.  Patient has a complicated month-long course of hospitalization and has been evaluated by vascular surgery service gastroenterology service cardiology service nephrology service oncology service urology service and general surgery service as well as intensivist service for various issues ranging from acute DVT of the left lower extremity and suspected thrombosis of tibial vessels, developed hemorrhagic shock after anticoagulation therapy requiring transfer to ICU with introduction of pressors and required central line placement and subsequent consultation with GI and cardiology service.  Over the course of subsequent days patient hemodynamic status eventually improved after treatment of his various active issues ranging from blood clot in the leg, hemorrhagic shock which was treated with transfusion and GI consultation and improvement in hemodynamics.  Patient did develop diarrhea and was tested positive for COVID-19 on 2022 for which he was started on appropriate treatment with oral vancomycin.  Patient has been on oral vancomycin since 2022 but 3 days later started to  shows worsening leukocytosis.  Today his white blood cell count went up to 15,000 despite being on oral vancomycin resulting in infectious disease consultation.  Patient himself is unable to give much account of his symptoms and is a very vague historian but does admit that he does not have appetite and occasionally feels nauseated.  This presentation of progressive leukocytosis in the above context with positive C. difficile toxin assay while being on oral vancomycin is concerning for:  1-possible clinical radiological evolving ileus contributing to ineffectiveness of oral vancomycin and hence progression of underlying C. 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  7-other diagnosis per primary team.    Recommendations/Discussions:  At this juncture I agree with your care plan consisting of oral vancomycin.  Given worsening leukocytosis would recommend adding IV Flagyl and work-up for evolving ileus which if found may require administration of vancomycin per rectum until his ileus resolved.  Management of other issues per primary team.  Thank you  for letting me be the part of your patient care please see above impression and recommendations    History of Present Illness:    Patient 71-year-old male with past medical history as noted below including history of hypertension hyperlipidemia benign prostatic hyperplasia and BPH as well as anemia presented to the emergency room with cough weakness and shortness of breath and was found to have multifocal COVID-19 pneumonia.  Patient was noted to have purple discoloration of his toes on the left foot as well as swelling of  the left foot.  Patient has a complicated month-long course of hospitalization and has been evaluated by vascular surgery service gastroenterology service cardiology service nephrology service oncology service urology service and general surgery service as well as intensivist service for various issues ranging from acute DVT of the left lower extremity and suspected thrombosis of tibial vessels, developed hemorrhagic shock after anticoagulation therapy requiring transfer to ICU with introduction of pressors and required central line placement and subsequent consultation with GI and cardiology service.  Over the course of subsequent days patient hemodynamic status eventually improved after treatment of his various active issues ranging from blood clot in the leg, hemorrhagic shock which was treated with transfusion and GI consultation and improvement in hemodynamics.  Patient did develop diarrhea and was tested positive for COVID-19 on 2/25/2022 for which he was started on appropriate treatment with oral vancomycin.  Patient has been on oral vancomycin since 2/25/2022 but 3 days later started to shows worsening leukocytosis.  Today his white blood cell count went up to 15,000 despite being on oral vancomycin resulting in infectious disease consultation.  Patient himself is unable to give much account of his symptoms and is a very vague historian but does admit that he does not have appetite and occasionally feels nauseated.      Past Medical History:  Past Medical History:   Diagnosis Date   • Anemia    • BPH (benign prostatic hyperplasia)    • Hyperlipidemia    • Hypertension      Past Surgical History:  Past Surgical History:   Procedure Laterality Date   • HERNIA REPAIR  1952   • INSERT CENTRAL LINE AT BEDSIDE  2/7/2022        • VENA CAVA FILTER INSERTION N/A 2/8/2022    Procedure: VENA CAVA FILTER INSERTION;  Surgeon: Ligia Méndez MD;  Location: [facility] 18/19;  Service: Vascular;  Laterality:  N/A;      Home Meds:  Medications Prior to Admission   Medication Sig Dispense Refill Last Dose   • amLODIPine (NORVASC) 10 MG tablet Take 1 tablet by mouth Daily. 90 tablet 3    • metoprolol tartrate (LOPRESSOR) 50 MG tablet Take 1 tablet by mouth 2 (Two) Times a Day. 180 tablet 3      Current Meds:     Current Facility-Administered Medications:   •  acetaminophen (TYLENOL) tablet 650 mg, 650 mg, Oral, Q4H PRN, 650 mg at 02/22/22 2118 **OR** acetaminophen (TYLENOL) 160 MG/5ML solution 650 mg, 650 mg, Oral, Q4H PRN **OR** acetaminophen (TYLENOL) suppository 650 mg, 650 mg, Rectal, Q4H PRN, Allen Rangel MD  •  albuterol sulfate HFA (PROVENTIL HFA;VENTOLIN HFA;PROAIR HFA) inhaler 2 puff, 2 puff, Inhalation, Q6H PRN, Allen Rangel MD  •  benzonatate (TESSALON) capsule 100 mg, 100 mg, Oral, TID PRN, Allen Rangel MD  •  calcium carbonate (TUMS) chewable tablet 500 mg (200 mg elemental), 2 tablet, Oral, BID PRN, Allen Rangel MD  •  cholestyramine (QUESTRAN) packet 1 packet, 1 packet, Oral, BID, Jacob Scanlon MD, 1 packet at 03/01/22 1044  •  collagenase ointment 1 application, 1 application, Topical, BID, Allen Rangel MD, 1 application at 03/01/22 1503  •  dilTIAZem (CARDIZEM) tablet 30 mg, 30 mg, Oral, Q8H, Colin Ramirez MD, 30 mg at 03/01/22 1503  •  famotidine (PEPCID) tablet 20 mg, 20 mg, Oral, BID AC, Jacob Scanlon MD, 20 mg at 03/01/22 1642  •  melatonin tablet 1 mg, 1 mg, Oral, Nightly PRN, Allen Rangel MD, 1 mg at 02/28/22 2154  •  muscle rub (BenGay) 10-15 % cream, , Topical, Q1H PRN, Allen Rangel MD  •  nitroglycerin (NITROSTAT) SL tablet 0.4 mg, 0.4 mg, Sublingual, Q5 Min PRN, Allen Rangel MD  •  ondansetron (ZOFRAN) tablet 4 mg, 4 mg, Oral, Q6H PRN **OR** ondansetron (ZOFRAN) injection 4 mg, 4 mg, Intravenous, Q6H PRN, Allen Rangel MD, 4 mg at 02/13/22 0444  •  Pharmacy Consult, , Does not apply, Continuous PRN, Jacob Scanlon MD  •  potassium & sodium phosphates (PHOS-NAK) 280-160-250 MG packet  "- for Phosphorus less than 1.25 mg/dL, 2 packet, Oral, Q6H PRN **OR** potassium & sodium phosphates (PHOS-NAK) 280-160-250 MG packet - for Phosphorus 1.25 - 2.5 mg/dL, 2 packet, Oral, Q6H PRN, Cristi Jacinto MD, 2 packet at 02/25/22 1137  •  [COMPLETED] Insert peripheral IV, , , Once **AND** sodium chloride 0.9 % flush 10 mL, 10 mL, Intravenous, PRN, Allen Rangel MD, 10 mL at 02/24/22 0811  •  vancomycin (VANCOCIN) capsule 125 mg, 125 mg, Oral, Q6H, Jacob Scanlon MD, 125 mg at 03/01/22 1503  Allergies:  Allergies   Allergen Reactions   • Ciprofloxacin Unknown (See Comments)     Patient is unsure what reaction he had, severe sinus pressure   • Grass    • Pollen Extract    • Sulfa Antibiotics Rash     Social History:   Social History     Tobacco Use   • Smoking status: Never Smoker   • Smokeless tobacco: Never Used   Substance Use Topics   • Alcohol use: No     Comment: quit a couple years ago      Family History:  Family History   Problem Relation Age of Onset   • Dementia Mother    • Angina Father    • Heart disease Father    • Heart attack Father    • Diabetes Maternal Uncle    • Prostate cancer Neg Hx    • Colon cancer Neg Hx           Review of Systems  See history of present illness and past medical history.   There is limited because patient is very vague in describing his symptoms.    Remainder of ROS is negative.      Vitals:   /76 (BP Location: Right arm, Patient Position: Lying)   Pulse 95   Temp 98 °F (36.7 °C) (Oral)   Resp 17   Ht 172 cm (67.72\")   Wt 51 kg (112 lb 7 oz)   SpO2 96%   BMI 17.24 kg/m²   I/O:     Intake/Output Summary (Last 24 hours) at 3/1/2022 2044  Last data filed at 3/1/2022 1812  Gross per 24 hour   Intake 3447 ml   Output 2650 ml   Net 797 ml     Exam:  Patient is examined using the personal protective equipment as per guidelines from infection control for this particular patient as enacted.  Hand washing was performed before and after patient " interaction.  General Appearance:   Chronically ill nontoxic-appearing male   Head:    Normocephalic, without obvious abnormality, atraumatic   Eyes:    PERRL, conjunctivae/corneas clear, EOM's intact, both eyes   Ears:    Normal external ear canals, both ears   Nose:   Nares normal, septum midline, mucosa normal, no drainage    or sinus tenderness   Throat:   Lips, tongue, gums normal; oral mucosa pink and moist   Neck:   Supple, symmetrical, trachea midline, no adenopathy;     thyroid:  no enlargement/tenderness/nodules; no carotid    bruit or JVD   Back:     Symmetric, no curvature, ROM normal, no CVA tenderness   Lungs:     Clear to auscultation bilaterally, respirations unlabored   Chest Wall:    No tenderness or deformity    Heart:    Regular rate and rhythm, S1 and S2 normal, no murmur, rub   or gallop   Abdomen:     Soft, non-tender, bowel sounds active all four quadrants,     no masses, no hepatomegaly, no splenomegaly   Extremities:  Left foot and toe dressed                   Skin:  Changes on his left foot noted   Neurologic:  Awake interactive       Data Review:    I reviewed the patient's new clinical results.  Results from last 7 days   Lab Units 03/01/22  0516 02/28/22  0546 02/27/22  0956 02/25/22  0554   WBC 10*3/mm3 15.26* 11.46* 6.85 7.97   HEMOGLOBIN g/dL 9.5* 10.4* 9.8* 10.0*   PLATELETS 10*3/mm3 516* 494* 452* 302     Results from last 7 days   Lab Units 03/01/22  0516 02/28/22  0546 02/27/22  0139 02/26/22  0559 02/25/22  0554 02/24/22  0425 02/23/22  0512   SODIUM mmol/L 135* 135* 134* 137 135* 138 139   POTASSIUM mmol/L 3.8 4.2 4.1 4.4 3.9 4.0 3.9   CHLORIDE mmol/L 103 104 102 103 102 104 106   CO2 mmol/L 24.0 21.9* 24.0 21.7* 23.2 24.1 26.0   BUN mg/dL 17 22 26* 23 28* 29* 29*   CREATININE mg/dL 0.63* 0.80 0.85 0.83 0.79 0.77 0.79   CALCIUM mg/dL 7.4* 7.8* 7.6* 8.0* 7.9* 7.8* 7.6*   GLUCOSE mg/dL 97 89 105* 71 98 104* 74     No results found for: CULTURE]    Assessment:  Active Hospital  Problems    Diagnosis  POA   • Nontraumatic retroperitoneal hematoma [K66.1]  No   • Severe malnutrition (CMS/HCC) [E43]  Yes   • Acute hypoxemic respiratory failure (HCC) [J96.01]  Yes   • Acute deep vein thrombosis (DVT) of left lower extremity  [I82.402]  Yes   • 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         Plan:  See above  Donald Hannah MD   3/1/2022  20:44 EST    Much of this encounter note is an electronic transcription/translation of spoken language to printed text. The electronic translation of spoken language may permit erroneous, or at times, nonsensical words or phrases to be inadvertently transcribed; Although I have reviewed the note for such errors, some may still exist

## 2022-03-02 NOTE — PROGRESS NOTES
.      Name: Jose Angel Us ADMIT: 2022   : 1950  PCP: Tarik Willis APRN    MRN: [id number] LOS: 28 days   AGE/SEX: 71 y.o. male  ROOM: [facility]     Subjective   Subjective   Patient seen this morning. No acute events overnight. Discussed with RN. Two loose bowel movements yesterday, but none overnight.Diarrhea improving. This morning patient laying in bed, not in distress. Denies abdominal pain.  Review of Systems   As above  Objective   Objective   Vital Signs  Temp:  [97.9 °F (36.6 °C)-98.3 °F (36.8 °C)] 98.1 °F (36.7 °C)  Heart Rate:  [80-96] 88  Resp:  [16-17] 16  BP: (115-135)/(64-76) 115/67  SpO2:  [93 %-97 %] 93 %  on   ;   Device (Oxygen Therapy): room air  Body mass index is 17.17 kg/m².  Physical Exam  Constitutional:       Appearance: Alert, chronically ill-appearing.  HENT:      Head: Normocephalic and atraumatic.   Cardiovascular:      Rate and Rhythm: Normal rate and regular rhythm.      Heart sounds: No murmur heard.  No friction rub.   Pulmonary:      Effort: Pulmonary effort is normal.      Breath sounds: Normal breath sounds.   Abdominal:      General: Bowel sounds are normal. There is no distension.      Palpations: Abdomen is soft.      Tenderness: There is no abdominal tenderness.   Musculoskeletal:      Comments: Left lower extremity wrapped. 3/5 strength LLE extremity   Skin:     General: Skin is warm and dry.   Neurological:      Mental Status: He is alert.   Psychiatric:         Mood and Affect: flat affect        Behavior: Behavior normal.   Results Review     I reviewed the patient's new clinical results.  Results from last 7 days   Lab Units 22  0516 22  0546 22  0956 22  0554   WBC 10*3/mm3 15.26* 11.46* 6.85 7.97   HEMOGLOBIN g/dL 9.5* 10.4* 9.8* 10.0*   PLATELETS 10*3/mm3 516* 494* 452* 302     Results from last 7 days   Lab Units 22  0635 22  0516 22  0546 22  0139   SODIUM mmol/L 134* 135* 135* 134*   POTASSIUM mmol/L 4.0 3.8  4.2 4.1   CHLORIDE mmol/L 104 103 104 102   CO2 mmol/L 22.9 24.0 21.9* 24.0   BUN mg/dL 15 17 22 26*   CREATININE mg/dL 0.77 0.63* 0.80 0.85   GLUCOSE mg/dL 94 97 89 105*   Estimated Creatinine Clearance: 59.1 mL/min (by C-G formula based on SCr of 0.77 mg/dL).  Results from last 7 days   Lab Units 03/02/22  0635 03/01/22  0516 02/28/22  0546 02/27/22  0139   ALBUMIN g/dL 1.90* 1.80* 1.40* 2.00*   BILIRUBIN mg/dL 0.5 0.5 0.5  --    ALK PHOS U/L 94 91 106  --    AST (SGOT) U/L 17 14 22  --    ALT (SGPT) U/L 12 12 15  --      Results from last 7 days   Lab Units 03/02/22  0635 03/01/22  0516 02/28/22  0546 02/27/22  0139   CALCIUM mg/dL 8.1* 7.4* 7.8* 7.6*   ALBUMIN g/dL 1.90* 1.80* 1.40* 2.00*   MAGNESIUM mg/dL 1.9 1.8 1.8 1.8   PHOSPHORUS mg/dL 2.8 2.7 1.8* 2.3*     Results from last 7 days   Lab Units 03/01/22  0516   PROCALCITONIN ng/mL 0.22     COVID19   Date Value Ref Range Status   02/02/2022 Detected (C) Not Detected - Ref. Range Final     No results found for: HGBA1C, POCGLU    XR Chest Post CVA Port  Narrative: XR CHEST POST CVA PORT-     INDICATIONS: Central line placement     TECHNIQUE: Frontal view of the chest     COMPARISON: 02/07/2022     FINDINGS:     Previous right IJ catheter is no longer seen. Left IJ catheter extends  to the superior vena cava. The heart size is normal. Aorta appears  tortuous. Pulmonary vasculature is unremarkable. Bilateral basilar  infiltrates/atelectasis are apparent, as well as mild bilateral pleural  effusions. No pneumothorax. Otherwise stable.     Impression:    Central line placement. No pneumothorax. Persistent bilateral basilar  infiltrates/atelectasis, mild pleural effusions.     This report was finalized on 2/13/2022 10:00 AM by Dr. Melvin Vela M.D.     CT Abdomen Pelvis Without Contrast  Addendum: ADDENDUM:   02 13 22 06:18 Verify Receipt Verified receipt with MARIE Williamson on 02 13 06:   18 (-05:00)  Narrative: CR  Patient: CRISTÓBAL CORTEZ  Time Out: 05:40  Exam(s):  CT ABDOMEN + PELVIS Without Contrast     EXAM:    CT Abdomen and Pelvis Without Intravenous Contrast    CLINICAL HISTORY:    Abdominal pain.    TECHNIQUE:    Axial computed tomography images of the abdomen and pelvis without   intravenous contrast.  CTDI is 15.1 mGy and DLP is 804.9 mGy-cm.  This CT   exam was performed according to the principle of ALARA (As Low As   Reasonably Achievable) by using one or more of the following dose   reduction techniques: automated exposure control, adjustment of the mA   and or kV according to patient size, and or use of iterative   reconstruction technique.    COMPARISON:    CT abdomen pelvis 2 8 2022    FINDINGS:    Lung bases:  Unremarkable.  No mass.  No consolidation.    Pleural space:  Moderate bilateral pleural effusions.  Airspace   opacities of the right lower and middle lobes and to a lesser degree the   left lower lobe are most concerning for atypical infection.     ABDOMEN:    Liver:  Unremarkable.    Gallbladder and bile ducts:  Question gallbladder sludge.  No calcified   stones.  No ductal dilation.    Pancreas:  Unremarkable.  No ductal dilation.    Spleen:  Unremarkable.  No splenomegaly.    Adrenals:  Unremarkable.  No mass.    Kidneys and ureters:  Nonobstructing 4 mm left renal calculus.  No   hydronephrosis of either kidney.    Stomach and bowel:  Diverticulosis.  No obstruction.  No mucosal   thickening.     PELVIS:    Appendix:  No findings to suggest acute appendicitis.    Bladder:  The urinary bladder is decompressed secondary to a Santana   catheter.  No stones.    Reproductive:  Unremarkable as visualized.     ABDOMEN and PELVIS:    Intraperitoneal space:  Unremarkable.  No free air.  No significant   fluid collection.    Bones joints:  There are degenerative changes spine.  No fracture.    Soft tissues:  Hematomas of the left iliopsoas muscle measures 4.8 x 5.  7 x 21.2 cm.  An additional hemorrhage of the iliac is muscle measured 8.  0 x 4.5 x 17.5 cm.   There is a moderate amount of blood in the left   abdomen extending from these hematomas.  There is diffuse body wall edema.      Vasculature:  There is an IVC filter.  Mild atherosclerosis.  No   aneurysm.    Lymph nodes:  Unremarkable.  No enlarged lymph nodes.    IMPRESSION:       1.  Hematomas of the left iliopsoas muscle measures 4.8 x 5.7 x 21.2 cm.    An additional hemorrhage of the iliac is muscle measured 8.0 x 4.5 x 17.5   cm.  There is a moderate amount of blood in the left abdomen extending   from these hematomas.  2.  Moderate bilateral pleural effusions.  Airspace opacities of the   right lower and middle lobes and to a lesser degree the left lower lobe   are most concerning for atypical infection.  3.  Diverticulosis.  4.  There is diffuse body wall edema.  5.  Question gallbladder sludge.  6.  Nonobstructing 4 mm left renal calculus.  No hydronephrosis of either   kidney.  Impression: Communications:     Verify Receipt     Electronically signed by Liset Shi MD on 02-13-22 at 0540    Scheduled Medications  cholestyramine, 1 packet, Oral, BID  collagenase, 1 application, Topical, BID  dilTIAZem, 30 mg, Oral, Q8H  famotidine, 20 mg, Oral, BID AC  metroNIDAZOLE, 500 mg, Intravenous, Q8H  vancomycin, 125 mg, Oral, Q6H    Infusions  Pharmacy Consult,     Diet  Diet Regular; Thin       Assessment/Plan     Active Hospital Problems    Diagnosis  POA   • Nontraumatic retroperitoneal hematoma [K66.1]  No   • Severe malnutrition (CMS/HCC) [E43]  Yes   • Acute hypoxemic respiratory failure (HCC) [J96.01]  Yes   • Acute deep vein thrombosis (DVT) of left lower extremity  [I82.402]  Yes   • 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       Mr. Us is a 71 year old male who presented from home with his sister for generalized weakness, cough and decreased warmth and color in his LLE.  He was noted to be COVID19 positive as well as found to have an acute DVT of the LLE with thrombosis of the artery as well. He was placed on heparin therapy and initially on telemetry. Unfortunately, he developed hemorrhagic shock the evening of 2/5 and was transferred to the ICU 2/6 for initial suspected GIB. He was eventually found to have a retroperitoneal bleed and vascular placed IVC filter on 2/8 with vascular surgery. GI was consulted initially for GI bleeding, but signed off after CT A/P revealed retroperitoneal bleed as the source. Hematology also followed at one time given his anemia and thrombocytopenia. This was all felt related to blood loss from the hematoma and HIT antibody was negative. He was transferred back to telemetry 2/11 but again developed bleeding and hemodynamically instability on 2/13 prompting being sent back to the ICU. He was found to have repeat hemorrhage as well as new lung infiltrate. He was given antibiotics for suspected secondary pneumonia and more blood for his bleeding. He was again stabilized and sent back to telemetry on 2/15. His stay was complicated by CARI, PAF as well as urinary retention. Vascular recommended amputation of his left lower extremity for his gangrene, but patient refuses.    C. difficile colitis, leukocytosis: WBC increased up to  15.26 03/01.Procalcitonicn 0.22.  Afebrile.  On p.o. vancomycin for C. difficile.  ID consulted. Started on IV metronidazole  03/02/2022. CBC pending from this morning.     COVID-19 pneumonia with acute hypoxic respiratory failure: Hypoxemia resolved.   Left foot gangrene/Tibial Occlusion: On presentation.  Vascular surgery input appreciated.  Amputation recommended.  Patient  declines  Acute DVT left popliteal peroneal soleal: On presentation.  Status post IVC filter 2/8  Retroperitoneal hematoma/hemorrhagic shock: Initial event associated anticoagulation.  Status post IVC filter.   Anemia-acute blood loss anemia-iron deficiency-anemia chronic disease: Status post transfusion 6 units packed RBCs.  Status post ferric gluconate infusion.  Hemoglobin  stable.  Thrombocytopenia: HIT antibody was negative.  Consumptive.  Resolved.  Transaminitis: Resolved.  Urinary retention/clot: Santana was replaced for retention.  Urology saw during the hospitalization, f/u with urology outpatient  PAF:   Now in NSR. No A/C candidate due to retroperitoneal hematoma.. cardiology following.  If arrhythmias persist would choose Cardizem per cards.   Elevated troponin: Troponin stable.  0.136  02/28. down from prior.  Cardiology following.  CARI on CKD 3: Secondary to prerenal/ATN/sepsis/retention.  Resolved.  Nephrology input appreciated.  C Diff Diarrhea-Clostridum difficile positive .  Started on oral vancomycin diarrhea , cholestyramine.  Still having loose bowels per day, but overall improved compared to prior        CODE STATUS: Full code  DVT prophylaxis: SCDs, patient had retroperitoneal hematoma  Disposition: SNF when medically stable.  Likely medically stable to be discharged 1-2 days if WBC and diarrhea continues to improve.     Jose Miguel Sullivan MD  [facility]  03/02/22  08:17 EST

## 2022-03-02 NOTE — PLAN OF CARE
"Goal Outcome Evaluation: patient remains on IV flagyl and oral vancomycin.  On RA and tolerating well.  Santana catheter removed 1305 this shift and patient has urinated 100ml since removing it.  Purewick in place. Patient keeps telling this RN \"you need to go ahead and put that other catheter back in.\"  This RN educated patient on the increased risk of infection the longer patients have FC in place.  Turned every two hours.  Dressing to left foot and coccyx completed.  VSS and call light in reach.  Safety maintained. WCTM.                 "

## 2022-03-02 NOTE — PROGRESS NOTES
"  Infectious Diseases Progress Note    Donald Hannah MD     [facility]  Los: 28 days  Patient Identification:  Name: Jose Angel Us  Age: 71 y.o.  Sex: male  :  1950  MRN: [id number]         Primary Care Physician: Tarik Willis APRN            Subjective: Feeling somewhat better.  Interval History: See consultation note.    Objective:    Scheduled Meds:cholestyramine, 1 packet, Oral, BID  collagenase, 1 application, Topical, BID  dilTIAZem, 30 mg, Oral, Q8H  famotidine, 20 mg, Oral, BID AC  metroNIDAZOLE, 500 mg, Intravenous, Q8H  vancomycin, 125 mg, Oral, Q6H      Continuous Infusions:Pharmacy Consult,         Vital signs in last 24 hours:  Temp:  [97.9 °F (36.6 °C)-98.3 °F (36.8 °C)] 98.1 °F (36.7 °C)  Heart Rate:  [80-96] 88  Resp:  [16-17] 16  BP: (115-135)/(64-76) 115/67    Intake/Output:    Intake/Output Summary (Last 24 hours) at 3/2/2022 0957  Last data filed at 3/2/2022 0500  Gross per 24 hour   Intake 1057 ml   Output 1900 ml   Net -843 ml       Exam:  /67 (BP Location: Right arm, Patient Position: Lying)   Pulse 88   Temp 98.1 °F (36.7 °C) (Oral)   Resp 16   Ht 172 cm (67.72\")   Wt 50.8 kg (112 lb)   SpO2 93%   BMI 17.17 kg/m²   Patient is examined using the personal protective equipment as per guidelines from infection control for this particular patient as enacted.  Hand washing was performed before and after patient interaction.  General Appearance:  Comfortable in no acute distress                          Head:    Normocephalic, without obvious abnormality, atraumatic                           Eyes:    PERRL, conjunctivae/corneas clear, EOM's intact, both eyes                         Throat:   Lips, tongue, gums normal; oral mucosa pink and moist                           Neck:   Supple, symmetrical, trachea midline, no JVD                         Lungs:    Clear to auscultation bilaterally, respirations unlabored                 Chest Wall:    No tenderness or " deformity                          Heart:    Regular rate and rhythm, S1 and S2 normal, no murmur, no rub                                         or gallop                  Abdomen:   Soft nontender                 Extremities:   Extremities normal, atraumatic, no cyanosis or edema                        Pulses:   Pulses palpable in all extremities                            Skin:   Skin is warm and dry,  no rashes or palpable lesions                  Neurologic: Awake and interactive       Data Review:    I reviewed the patient's new clinical results.  Results from last 7 days   Lab Units 03/02/22  0635 03/01/22  0516 02/28/22  0546 02/27/22  0956 02/25/22  0554   WBC 10*3/mm3 19.68* 15.26* 11.46* 6.85 7.97   HEMOGLOBIN g/dL 9.3* 9.5* 10.4* 9.8* 10.0*   PLATELETS 10*3/mm3 497* 516* 494* 452* 302     Results from last 7 days   Lab Units 03/02/22  0635 03/01/22  0516 02/28/22  0546 02/27/22  0139 02/26/22  0559 02/25/22  0554 02/24/22  0425   SODIUM mmol/L 134* 135* 135* 134* 137 135* 138   POTASSIUM mmol/L 4.0 3.8 4.2 4.1 4.4 3.9 4.0   CHLORIDE mmol/L 104 103 104 102 103 102 104   CO2 mmol/L 22.9 24.0 21.9* 24.0 21.7* 23.2 24.1   BUN mg/dL 15 17 22 26* 23 28* 29*   CREATININE mg/dL 0.77 0.63* 0.80 0.85 0.83 0.79 0.77   CALCIUM mg/dL 8.1* 7.4* 7.8* 7.6* 8.0* 7.9* 7.8*   GLUCOSE mg/dL 94 97 89 105* 71 98 104*       Microbiology Results (last 10 days)     Procedure Component Value - Date/Time    Clostridium Difficile Toxin - Stool, Per Rectum [[id number]]  (Abnormal) Collected: 02/25/22 0219    Lab Status: Final result Specimen: Stool from Per Rectum Updated: 02/25/22 0601    Narrative:      The following orders were created for panel order Clostridium Difficile Toxin - Stool, Per Rectum.  Procedure                               Abnormality         Status                     ---------                               -----------         ------                     Clostridium Difficile To...[[id number]]  Abnormal             Final result                 Please view results for these tests on the individual orders.    Clostridium Difficile Toxin, PCR - Stool, Per Rectum [[id number]]  (Abnormal) Collected: 02/25/22 0219    Lab Status: Final result Specimen: Stool from Per Rectum Updated: 02/25/22 0601     C. 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  7-other diagnosis per primary team.     Recommendations/Discussions:  · At this juncture I agree with your care plan consisting of oral vancomycin.    · Given worsening leukocytosis would recommend adding IV Flagyl and work-up for evolving ileus which if found may require administration of vancomycin per rectum until his ileus resolved.  · Management of other issues per primary team.    Donald Hannah MD  3/2/2022  09:57 EST    Much of this encounter note is an electronic transcription/translation of spoken language to printed text. The electronic translation of spoken language may permit erroneous, or at times, nonsensical words or phrases to be  inadvertently transcribed; Although I have reviewed the note for such errors, some may still exist

## 2022-03-02 NOTE — CASE MANAGEMENT/SOCIAL WORK
Continued Stay Note  [facility]     Patient Name: Jose Angel Us  MRN: [id number]  Today's Date: 3/2/2022    Admit Date: 2/2/2022     Discharge Plan     Row Name 03/02/22 1430       Plan    Plan Ana- pre-cert initiated today    Patient/Family in Agreement with Plan yes    Plan Comments Per notes, patient appears to be nearing dc.  Spoke with Nya/Ana and pre-cert was initiated this afternoon.  Message left for sister, Daphne, to update that patient appears be nearing dc.  Awaiting call back. Varsha Tilley RN               Discharge Codes    No documentation.               Expected Discharge Date and Time     Expected Discharge Date Expected Discharge Time    Mar 4, 2022             Varsha Tilley RN

## 2022-03-02 NOTE — PLAN OF CARE
9/24/2019      RE: Lazaro Lund  [address] [facility] [id number]       Lazaro Lund is a 19 year old young man with newly diagnosed T-cell lymphoblastic lymphoma. Lazaro presented with acute onset cough and was found to have an anterior mediastinal mass and malignant left-sided pleural effusion.  A CT guided biopsy was obtained at [facility] and pathology was consistent with T-cell leukemia vs lymphoma.  He was admitted to [facility] oncology service 8/30 and started on treatment per [id number].  He had a pleural effusion that required a chest tube and a pericardial effusion that was not drained. His Induction was complicated by cardiac compression secondary to his mass leading to tamponade, this improved through out his hospital stay.  He also had dysphagia that improved with treatment of his mass, swallow study was normal.    Lazaro comes to clinic today for Day 22 of Induction therapy. Lazaro reports he's been doing pretty well since his last visit.  His energy level remains good.  He notes some muscle soreness in his legs that comes and goes, no weakness.  Is able to ambulate without difficulty.  Lazaro has a mild cough, no fever. He is eating well. Has noted his cheeks are puffy and wonders what this is from.      Lazaro has noted a rash on his arms and chest that seems to be progressing.  He also notes his right arm (where his PICC is) seemed to get swollen during the day but then he would elevate it at night and the swelling would resolve.  This started on Friday, has not gotten progressively worse.  He does not have pain, no warmth, has not noted any collaterals.    Lazaro notes his blurry vision has improved.  Continues to pass stool daily, utilizes senna daily and miralax PRN (took last yesterday).  Denies paresthesias, getting around without difficulty. No dysphagia. Sleeping well, taking melatonin and benadryl at HS.    Review of systems:  Remainder of ROS is complete an negative    PMH:  Goal Outcome Evaluation:      IV Flagyl started, po Vancomycin continued. VSS. Patient continues to act withdrawn and, although not rude, doesn't want to be bothered more than necessary. Q2 turns. Good urine output.         Past Medical History:   Diagnosis Date     Migraine 2006    have resolved     T lymphoblastic lymphoma (H) 08/30/2019       PF:   Family History   Problem Relation Age of Onset     No Known Problems Mother      No Known Problems Father      Asthma Brother      Thyroid Cancer Paternal Grandmother      Melanoma Paternal Aunt        Social History: Lazaro previously lived at home with his dad and step mom in [place] but is now staying with his mom in [place].  He was working full time at [employer] in [place] prior to his diagnosis.    Current Medications:  Current Outpatient Medications   Medication Sig Dispense Refill     dexamethasone (DECADRON) 6 MG tablet Take 1 tablet (6 mg) by mouth every 12 hours for 19 days 38 tablet 0     diphenhydrAMINE (BENADRYL) 25 MG capsule Take 1-2 capsules (25-50 mg) by mouth every 6 hours as needed (Breakthrough Nausea and Vomiting ) 30 capsule 1     melatonin 3 MG tablet Take 1 tablet (3 mg) by mouth At Bedtime 30 tablet 1     NO ACTIVE MEDICATIONS .       ondansetron (ZOFRAN) 8 MG tablet Take 1 tablet (8 mg) by mouth every 6 hours as needed for nausea 30 tablet 1     oxyCODONE (ROXICODONE) 5 MG tablet Take 1 tablet (5 mg) by mouth every 6 hours as needed for moderate to severe pain 10 tablet 0     pantoprazole (PROTONIX) 40 MG EC tablet Take 1 tablet (40 mg) by mouth daily for 20 days . This acid blocker helps prevent stomach pain while on your decadron chemotherapy. 20 tablet 0     polyethylene glycol (MIRALAX/GLYCOLAX) packet Take 17 g by mouth daily 30 packet 0     sennosides (SENOKOT) 8.6 MG tablet Take 2 tablets by mouth 2 times daily as needed for constipation 60 each 1     sulfamethoxazole-trimethoprim (BACTRIM DS/SEPTRA DS) 800-160 MG tablet Take 1 tablet by mouth Every Mon, Tues two times daily 16 tablet 0   Above medications reviewed, no missed doses of dexamethasone.     Physical Exam:   Temp:  [98.1  F (36.7  C)] 98.1  F (36.7  C)  Pulse:  [88] 88  Resp:  [16]  16  BP: (120)/(71) 120/71  SpO2:  [98 %] 98 %  Wt Readings from Last 4 Encounters:   09/24/19 79 kg (174 lb 2.6 oz)   09/19/19 72.3 kg (159 lb 6.3 oz)   09/17/19 73.3 kg (161 lb 9.6 oz)   09/16/19 74.8 kg (164 lb 14.5 oz)     Weight at diagnosis was 75.2kg, Lazaro considers his baseline weight to be 173-175lbs before he initially got sick    General: Lazaro appears well, he is in good spirits and asks good questions. He is cushingoid.   HEENT: Skull is atrauamatic and normocephalic. PERRLA, sclera are non icteric and not injected, EOM are intact. Slight disconjugate gaze.  Nares are patent without drainage.  Oropharynx is clear without exudate, erythema or lesions.  Tympanic membranes are opaque bilaterally with light reflex and landmarks present.  Lymph:  Neck is supple without lymphadenopathy.  There is no supraclavicular, axillary or inguinal lymphadenopathy palpated.  Cardiovascular:  HR is regular, S1, S2 no murmur.  Capillary refill is < 2 seconds.  There is no edema.  Right arm is not obviously swollen, no pain or warmth, no collaterals noted. PICC was pulled prior to my exam. Pulses in right arm are strong.  Measurements of arms as follows (Lazaro is right handed)  Right wrist: 18cm, left wrist: 18cm  Right forearm (15cm from wrist): 26.75cm, left forearm: 25.5cm  Right arm at AC: 29cm; left arm at AC 28.5cm  Right bicep (10cm from AC): 32.5cm; left bicep 30cm  Respiratory: Occasional cough. Respirations are easy.  Lungs are clear to auscultation through out.  No crackles or wheezes.  Gastrointestinal:  BS present in all quadrants.  Abdomen is soft and non-tender.  No hepatosplenomegaly or masses are palpated.  Skin: Purpura near old PICC site on right arm.  Few scattered petechiae distal to the site.  Scattered maculopapular lesions on chest and back and upper arms.  Neurological:  CN 2-12 tested and intact with exception of mildly disconjugate gaze. Gait is normal.  No issues with balance. Sensation intact  in hands and feet.    Musculoskeletal:  Good strength and ROM in all extremities.  Strong dorsiflexion at ankles and great toes (5/5) bilaterally without any pain at the Achilles.    Labs:   Results for orders placed or performed in visit on 09/24/19 (from the past 24 hour(s))   CBC with platelets differential   Result Value Ref Range    WBC 9.7 4.0 - 11.0 10e9/L    RBC Count 4.67 4.4 - 5.9 10e12/L    Hemoglobin 12.4 (L) 13.3 - 17.7 g/dL    Hematocrit 39.2 (L) 40.0 - 53.0 %    MCV 84 78 - 100 fl    MCH 26.6 26.5 - 33.0 pg    MCHC 31.6 31.5 - 36.5 g/dL    RDW 14.0 10.0 - 15.0 %    Platelet Count 136 (L) 150 - 450 10e9/L    Diff Method Automated Method     % Neutrophils 82.0 %    % Lymphocytes 11.1 %    % Monocytes 2.7 %    % Eosinophils 0.1 %    % Basophils 0.3 %    % Immature Granulocytes 3.8 %    Nucleated RBCs 0 0 /100    Absolute Neutrophil 8.0 1.6 - 8.3 10e9/L    Absolute Lymphocytes 1.1 0.8 - 5.3 10e9/L    Absolute Monocytes 0.3 0.0 - 1.3 10e9/L    Absolute Eosinophils 0.0 0.0 - 0.7 10e9/L    Absolute Basophils 0.0 0.0 - 0.2 10e9/L    Abs Immature Granulocytes 0.4 0 - 0.4 10e9/L    Absolute Nucleated RBC 0.0    Basic metabolic panel   Result Value Ref Range    Sodium 138 133 - 144 mmol/L    Potassium 4.4 3.4 - 5.3 mmol/L    Chloride 107 94 - 109 mmol/L    Carbon Dioxide 23 20 - 32 mmol/L    Anion Gap 8 3 - 14 mmol/L    Glucose 102 (H) 70 - 99 mg/dL    Urea Nitrogen 33 (H) 7 - 30 mg/dL    Creatinine 0.56 (L) 0.66 - 1.25 mg/dL    GFR Estimate >90 >60 mL/min/[1.73_m2]    GFR Estimate If Black >90 >60 mL/min/[1.73_m2]    Calcium 7.0 (L) 8.5 - 10.1 mg/dL       Assessment:  Lazaro Budreau is a 19 year old young man with newly diagnosed T Cell lymphoblastic lymphoma (marrow and CNS negative).  He is being treated per [facility] protocol [id number] and comes to clinic today for Day 22 of Induction therapy.  His presentation was complicated by a pleural and pericardial effusion with mass effect leading to cardiac tamponade.   Fortunately as his mass has responded to treatment these have improved.  His therapy is being modified to include dexamethasone (instead of prednisone) which is now considered standard of care.  He had a spinal headache which is now fully resolved.  Mild blurry distance vision has resolved.  He is cushingoid secondary to steroids.      PICC line removed since line is not drawing. Some question of swelling of his right arm although measurements are as expected given he is right handed, also swelling resolved overnight which I would not expect in the case of thrombus.  No associated concerning symptoms. Likely swelling was related to lack of venous return since he was not using that arm.  His coags are abnormal which is expected given his recent treatment with PEG asparaginase.  His blood counts look good.  Mild folliculitis.    Plan:   1. Reviewed labs with Lazaro and his mom.  Reviewed that laboratory coagulopathy is common with PEG Asparaginase but does not require treatment unless it leads to bleeding or thrombosis.  His purpura has not progressed and he is not having any bleeding.  Will closely monitor and he will call if symptoms develop.  Would consider giving cryoprecipitate if that is the case.  2. Given significant spinal headache will plan for IV caffeine following LPs in the future.   3. Cardiology follow up next month given presenting cardiac tamponade and pericardial effusion along with recent abnormal EKG.  4. Discussed concerns at PICC site.  Given he does not have significant swelling, collaterals or pain will plan to closely monitor. If he develops any other symptoms will have low threshold to ultrasound.  5. Reviewed Consolidation calendar and plan for the upcoming weeks.  Spoke with [facility] to establish care for his cytarabine.  6. Continue dexamethasone until 9/28 AM.  Lazaro missed a small amount of his doses since he was taking 2mg BID (instead of 6mg BID) for two doses upon discharge.  He is  taking appropriate dose now and will still plan to finish on 9/28.  7. Provided reassurance regarding cushingoid appearance and that this will slowly resolve once he comes off therapy.  Also noted 15lb weight gain today, asked for Lazaro to be weighed again but he had left clinic.  Will recheck on Thursday.  8. Continue bactrim for PCP ppx through out therapy.  9. TPMT shows Intermediate activity.   10. Plan to check Vitamin D level at the end of Induction.  11. Given Lazaro's marrow was negative at diagnosis he will not require a repeat marrow in the future.  12. Plan to repeat imaging with neck, chest, abdomen, pelvis CT at the end of Induction.  He will also have a port placed at that time.  Rx sent locally for emla and instructed on use.  13. Will contact [facility] regarding Lazaro's upcoming need for home cytarabine.  14. Supportive care for folliculitis, avoid picking, would expect this to resolve as he comes off steroids.  15. RTC on Thursday for labs and exam, sooner with concerns.       YOHAN Dunn CNP

## 2022-03-03 ENCOUNTER — APPOINTMENT (OUTPATIENT)
Dept: CT IMAGING | Facility: HOSPITAL | Age: 72
End: 2022-03-03

## 2022-03-03 LAB
ALBUMIN SERPL-MCNC: 1.7 G/DL (ref 3.5–5.2)
ALBUMIN/GLOB SERPL: 0.5 G/DL
ALP SERPL-CCNC: 100 U/L (ref 39–117)
ALT SERPL W P-5'-P-CCNC: 11 U/L (ref 1–41)
ANION GAP SERPL CALCULATED.3IONS-SCNC: 4.5 MMOL/L (ref 5–15)
AST SERPL-CCNC: 18 U/L (ref 1–40)
BILIRUB SERPL-MCNC: 0.6 MG/DL (ref 0–1.2)
BILIRUB UR QL STRIP: NEGATIVE
BUN SERPL-MCNC: 16 MG/DL (ref 8–23)
BUN/CREAT SERPL: 20.5 (ref 7–25)
CALCIUM SPEC-SCNC: 8.2 MG/DL (ref 8.6–10.5)
CHLORIDE SERPL-SCNC: 101 MMOL/L (ref 98–107)
CLARITY UR: CLEAR
CO2 SERPL-SCNC: 28.5 MMOL/L (ref 22–29)
COLOR UR: YELLOW
CREAT SERPL-MCNC: 0.78 MG/DL (ref 0.76–1.27)
D-LACTATE SERPL-SCNC: 2.2 MMOL/L (ref 0.5–2)
DEPRECATED RDW RBC AUTO: 48.9 FL (ref 37–54)
EGFRCR SERPLBLD CKD-EPI 2021: 94.2 ML/MIN/1.73
EOSINOPHIL # BLD MANUAL: 0.24 10*3/MM3 (ref 0–0.4)
EOSINOPHIL NFR BLD MANUAL: 1 % (ref 0.3–6.2)
ERYTHROCYTE [DISTWIDTH] IN BLOOD BY AUTOMATED COUNT: 15.6 % (ref 12.3–15.4)
GLOBULIN UR ELPH-MCNC: 3.4 GM/DL
GLUCOSE SERPL-MCNC: 87 MG/DL (ref 65–99)
GLUCOSE UR STRIP-MCNC: NEGATIVE MG/DL
HCT VFR BLD AUTO: 31.7 % (ref 37.5–51)
HGB BLD-MCNC: 10.1 G/DL (ref 13–17.7)
HGB UR QL STRIP.AUTO: NEGATIVE
HYPOCHROMIA BLD QL: ABNORMAL
KETONES UR QL STRIP: NEGATIVE
LEUKOCYTE ESTERASE UR QL STRIP.AUTO: NEGATIVE
LYMPHOCYTES # BLD MANUAL: 1.21 10*3/MM3 (ref 0.7–3.1)
LYMPHOCYTES NFR BLD MANUAL: 6 % (ref 5–12)
MAGNESIUM SERPL-MCNC: 1.8 MG/DL (ref 1.6–2.4)
MCH RBC QN AUTO: 28.1 PG (ref 26.6–33)
MCHC RBC AUTO-ENTMCNC: 31.9 G/DL (ref 31.5–35.7)
MCV RBC AUTO: 88.1 FL (ref 79–97)
METAMYELOCYTES NFR BLD MANUAL: 3 % (ref 0–0)
MONOCYTES # BLD: 1.45 10*3/MM3 (ref 0.1–0.9)
MYELOCYTES NFR BLD MANUAL: 3 % (ref 0–0)
NEUTROPHILS # BLD AUTO: 19.8 10*3/MM3 (ref 1.7–7)
NEUTROPHILS NFR BLD MANUAL: 82 % (ref 42.7–76)
NITRITE UR QL STRIP: NEGATIVE
NRBC BLD AUTO-RTO: 0 /100 WBC (ref 0–0.2)
PH UR STRIP.AUTO: 6.5 [PH] (ref 5–8)
PHOSPHATE SERPL-MCNC: 3.1 MG/DL (ref 2.5–4.5)
PLAT MORPH BLD: NORMAL
PLATELET # BLD AUTO: 589 10*3/MM3 (ref 140–450)
PMV BLD AUTO: 9.6 FL (ref 6–12)
POTASSIUM SERPL-SCNC: 4 MMOL/L (ref 3.5–5.2)
PROCALCITONIN SERPL-MCNC: 0.23 NG/ML (ref 0–0.25)
PROT SERPL-MCNC: 5.1 G/DL (ref 6–8.5)
PROT UR QL STRIP: NEGATIVE
RBC # BLD AUTO: 3.6 10*6/MM3 (ref 4.14–5.8)
SODIUM SERPL-SCNC: 134 MMOL/L (ref 136–145)
SP GR UR STRIP: 1.01 (ref 1–1.03)
UROBILINOGEN UR QL STRIP: NORMAL
VARIANT LYMPHS NFR BLD MANUAL: 5 % (ref 19.6–45.3)
WBC MORPH BLD: NORMAL
WBC NRBC COR # BLD: 24.15 10*3/MM3 (ref 3.4–10.8)

## 2022-03-03 PROCEDURE — 83735 ASSAY OF MAGNESIUM: CPT | Performed by: STUDENT IN AN ORGANIZED HEALTH CARE EDUCATION/TRAINING PROGRAM

## 2022-03-03 PROCEDURE — 92526 ORAL FUNCTION THERAPY: CPT

## 2022-03-03 PROCEDURE — 97110 THERAPEUTIC EXERCISES: CPT

## 2022-03-03 PROCEDURE — 84145 PROCALCITONIN (PCT): CPT | Performed by: HOSPITALIST

## 2022-03-03 PROCEDURE — 83605 ASSAY OF LACTIC ACID: CPT | Performed by: HOSPITALIST

## 2022-03-03 PROCEDURE — 85007 BL SMEAR W/DIFF WBC COUNT: CPT | Performed by: STUDENT IN AN ORGANIZED HEALTH CARE EDUCATION/TRAINING PROGRAM

## 2022-03-03 PROCEDURE — 97112 NEUROMUSCULAR REEDUCATION: CPT

## 2022-03-03 PROCEDURE — 84100 ASSAY OF PHOSPHORUS: CPT | Performed by: INTERNAL MEDICINE

## 2022-03-03 PROCEDURE — 80053 COMPREHEN METABOLIC PANEL: CPT | Performed by: STUDENT IN AN ORGANIZED HEALTH CARE EDUCATION/TRAINING PROGRAM

## 2022-03-03 PROCEDURE — 74176 CT ABD & PELVIS W/O CONTRAST: CPT

## 2022-03-03 PROCEDURE — 85025 COMPLETE CBC W/AUTO DIFF WBC: CPT | Performed by: STUDENT IN AN ORGANIZED HEALTH CARE EDUCATION/TRAINING PROGRAM

## 2022-03-03 PROCEDURE — 81003 URINALYSIS AUTO W/O SCOPE: CPT | Performed by: NURSE PRACTITIONER

## 2022-03-03 RX ADMIN — COLLAGENASE SANTYL 1 APPLICATION: 250 OINTMENT TOPICAL at 09:36

## 2022-03-03 RX ADMIN — SODIUM CHLORIDE, PRESERVATIVE FREE 10 ML: 5 INJECTION INTRAVENOUS at 20:10

## 2022-03-03 RX ADMIN — VANCOMYCIN HYDROCHLORIDE 125 MG: 125 CAPSULE ORAL at 15:18

## 2022-03-03 RX ADMIN — FAMOTIDINE 20 MG: 20 TABLET ORAL at 17:56

## 2022-03-03 RX ADMIN — DILTIAZEM HYDROCHLORIDE 30 MG: 30 TABLET, FILM COATED ORAL at 22:10

## 2022-03-03 RX ADMIN — Medication 1 MG: at 22:15

## 2022-03-03 RX ADMIN — METRONIDAZOLE 500 MG: 500 INJECTION, SOLUTION INTRAVENOUS at 15:14

## 2022-03-03 RX ADMIN — METRONIDAZOLE 500 MG: 500 INJECTION, SOLUTION INTRAVENOUS at 06:42

## 2022-03-03 RX ADMIN — DILTIAZEM HYDROCHLORIDE 30 MG: 30 TABLET, FILM COATED ORAL at 06:23

## 2022-03-03 RX ADMIN — VANCOMYCIN HYDROCHLORIDE 125 MG: 125 CAPSULE ORAL at 20:09

## 2022-03-03 RX ADMIN — FAMOTIDINE 20 MG: 20 TABLET ORAL at 06:23

## 2022-03-03 RX ADMIN — METRONIDAZOLE 500 MG: 500 INJECTION, SOLUTION INTRAVENOUS at 22:10

## 2022-03-03 RX ADMIN — COLLAGENASE SANTYL 1 APPLICATION: 250 OINTMENT TOPICAL at 22:11

## 2022-03-03 RX ADMIN — DILTIAZEM HYDROCHLORIDE 30 MG: 30 TABLET, FILM COATED ORAL at 15:18

## 2022-03-03 RX ADMIN — VANCOMYCIN HYDROCHLORIDE 125 MG: 125 CAPSULE ORAL at 09:36

## 2022-03-03 RX ADMIN — CHOLESTYRAMINE 1 PACKET: 4 POWDER, FOR SUSPENSION ORAL at 22:10

## 2022-03-03 RX ADMIN — VANCOMYCIN HYDROCHLORIDE 125 MG: 125 CAPSULE ORAL at 04:13

## 2022-03-03 RX ADMIN — CHOLESTYRAMINE 1 PACKET: 4 POWDER, FOR SUSPENSION ORAL at 10:30

## 2022-03-03 NOTE — THERAPY TREATMENT NOTE
Patient Name: Jose Angel Us  : 1950    MRN: [id number]                              Today's Date: 3/3/2022       Admit Date: 2022    Visit Dx:     ICD-10-CM ICD-9-CM   1. Acute hypoxemic respiratory failure (HCC)  J96.01 518.81   2. COVID-19 virus infection  U07.1 079.89   3. Pneumonia due to COVID-19 virus  U07.1 480.8    J12.82 079.89   4. Intravascular volume depletion  E86.1 276.52   5. CARI (acute kidney injury) (HCC)  N17.9 584.9   6. Bilateral lower extremity edema  R60.0 782.3   7. 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    • Pneumonia due to COVID-19 virus   • CARI (acute kidney injury) (HCC)   • History of Raynaud's syndrome   • Weight loss   • Severe malnutrition (CMS/HCC)   • Nontraumatic retroperitoneal hematoma     Past Medical History:   Diagnosis Date   • Anemia    • BPH (benign prostatic hyperplasia)    • Hyperlipidemia    • Hypertension      Past Surgical History:   Procedure Laterality Date   • HERNIA REPAIR     • INSERT CENTRAL LINE AT BEDSIDE  2022        • VENA CAVA FILTER INSERTION N/A 2022    Procedure: VENA CAVA FILTER INSERTION;  Surgeon: Ligia Méndez MD;  Location: [facility] ;  Service: Vascular;  Laterality: N/A;      General Information     Row Name 22 1431          OT Time and Intention    Document Type therapy note (daily note)  -MW     Mode of Treatment occupational therapy; physical therapy; co-treatment  -MW     Row Name 22 143          General Information    Existing Precautions/Restrictions fall  -MW     Row Name 22 143          Cognition    Orientation Status (Cognition) oriented  to; person  -     Row Name 03/03/22 1431          Safety Issues, Functional Mobility    Impairments Affecting Function (Mobility) balance; coordination; endurance/activity tolerance; postural/trunk control; strength; range of motion (ROM)  -     Comment, Safety Issues/Impairments (Mobility) non skid socks and gait belt donned  -           User Key  (r) = Recorded By, (t) = Taken By, (c) = Cosigned By    Initials Name Provider Type    Genny Dominguez OT Occupational Therapist                 Mobility/ADL's     Row Name 03/03/22 1432          Bed Mobility    Bed Mobility supine-sit; sit-supine  -MW     Supine-Sit [place] (Bed Mobility) verbal cues; nonverbal cues (demo/gesture); moderate assist (50% patient effort)  -     Sit-Supine [place] (Bed Mobility) moderate assist (50% patient effort); verbal cues; nonverbal cues (demo/gesture)  -     Assistive Device (Bed Mobility) bed rails; head of bed elevated  -     Comment (Bed Mobility) increased cueing and increased time to complete  -     Row Name 03/03/22 1432          Transfers    Transfers sit-stand transfer  -     Comment (Transfers) x1 STS this date, max A x2, ~15 seconds, unable to initiate lateral steps to HOB,  however attempting  -     Bed-Chair [place] (Transfers) not tested  -     Sit-Stand [place] (Transfers) maximum assist (25% patient effort); 2 person assist; verbal cues; nonverbal cues (demo/gesture)  -           User Key  (r) = Recorded By, (t) = Taken By, (c) = Cosigned By    Initials Name Provider Type    Genny Dominguez OT Occupational Therapist               Obj/Interventions     Row Name 03/03/22 1433          Motor Skills    Motor Skills motor control/coordination interventions  -     Motor Control/Coordination Interventions neuro-muscular re-education  -     Row Name 03/03/22 1433          Balance    Balance Assessment sitting static balance; sitting dynamic balance; sit to stand dynamic  balance; standing static balance  -MW     Static Sitting Balance WFL; sitting, edge of bed; unsupported  -MW     Dynamic Sitting Balance mild impairment; unsupported; sitting, edge of bed  -MW     Sit to Stand Dynamic Balance severe impairment; moderate impairment; supported; standing  -MW     Static Standing Balance moderate impairment; severe impairment; supported; standing  -MW     Dynamic Standing Balance not tested  -MW     Balance Interventions sitting; standing; sit to stand; supported; static; dynamic; minimal challenge; core stability exercise; trunk training exercise  -MW     Comment, Balance Pt tolerated EOB ~8 mins this date with mainly SBA, however min A throughout dynamic challenges with cues to initiate upright posture  -MW     Row Name 03/03/22 1433          Neuromuscular Re-education    Positioning (Neuromuscular Re-education) sitting  -MW     Equipment Used (Neuromuscular Re-education) While at EOB, pt demo ability to complete forward trunk flexion strengthening, lateral lean onto elbows 2 sets x 10 reps, and anterior/posterior trunk strengthening at EOB for 2 sets x 10 reps, RBs required throughout. min A for dynamic sitting balance  -MW           User Key  (r) = Recorded By, (t) = Taken By, (c) = Cosigned By    Initials Name Provider Type    Genny Dominguez OT Occupational Therapist               Goals/Plan    No documentation.                Clinical Impression     Row Name 03/03/22 1436          Pain Assessment    Additional Documentation Pain Scale: Numbers Pre/Post-Treatment (Group)  -MW     Row Name 03/03/22 1436          Pain Scale: Numbers Pre/Post-Treatment    Pretreatment Pain Rating 0/10 - no pain  -MW     Posttreatment Pain Rating 0/10 - no pain  -MW     Row Name 03/03/22 1436          Plan of Care Review    Plan of Care Reviewed With patient  -MW     Progress improving  -     Outcome Summary Pt seen this date for OT/PT co tx session due to poor activity tolerance with multiple  sessions and assist x2 required for OOB activity. Pt required modA for bed mobility. While at EOB, pt engaged in upper trunk strengthening, targeting lateral trunk flexion, foward trunk flexion, and anterior/posterior functional task. Pt completed 2 steps x 10 reps with RBs throughout. Min A for dynamic sitting balance. x1 STS from EOB with max A x2, HHAx2, unable to take steps. Pt will continue to benefit from skilled OT to address goals and deficits.  -     Row Name 03/03/22 1436          Therapy Plan Review/Discharge Plan (OT)    Anticipated Discharge Disposition (OT) skilled nursing facility  -     Row Name 03/03/22 1436          Vital Signs    O2 Delivery Pre Treatment room air  -MW     O2 Delivery Intra Treatment room air  -MW     O2 Delivery Post Treatment room air  -MW     Pre Patient Position Supine  -MW     Intra Patient Position Standing  -     Post Patient Position Supine  -     Row Name 03/03/22 1436          Positioning and Restraints    In Bed notified nsg; encouraged to call for assist; supine; call light within reach; exit alarm on; side rails up x2; legs elevated  -           User Key  (r) = Recorded By, (t) = Taken By, (c) = Cosigned By    Initials Name Provider Type    Genny Dominguez, OT Occupational Therapist               Outcome Measures     Row Name 03/03/22 1438          How much help from another is currently needed...    Putting on and taking off regular lower body clothing? 1  -MW     Bathing (including washing, rinsing, and drying) 2  -MW     Toileting (which includes using toilet bed pan or urinal) 1  -MW     Putting on and taking off regular upper body clothing 2  -MW     Taking care of personal grooming (such as brushing teeth) 3  -MW     Eating meals 3  -MW     AM-PAC 6 Clicks Score (OT) 12  -MW     Row Name 03/03/22 1438          Functional Assessment    Outcome Measure Options AM-PAC 6 Clicks Daily Activity (OT)  -MW           User Key  (r) = Recorded By, (t) =  Taken By, (c) = Cosigned By    Initials Name Provider Type    Genny Dominguez, OT Occupational Therapist                Occupational Therapy Education                 Title: PT OT SLP Therapies (In Progress)     Topic: Occupational Therapy (Done)     Point: ADL training (Done)     Description:   Instruct learner(s) on proper safety adaptation and remediation techniques during self care or transfers.   Instruct in proper use of assistive devices.              Learning Progress Summary           Patient Acceptance, E,TB, VU by CB at 2/5/2022 0423   Family Acceptance, E, VU by AG at 2/3/2022 1638      Show all documentation for this point (2)                 Point: Home exercise program (Done)     Description:   Instruct learner(s) on appropriate technique for monitoring, assisting and/or progressing therapeutic exercises/activities.              Learning Progress Summary           Patient Acceptance, E,TB, VU by CB at 2/5/2022 0423   Family Acceptance, E, VU by AG at 2/3/2022 1638      Show all documentation for this point (1)                 Point: Precautions (Done)     Description:   Instruct learner(s) on prescribed precautions during self-care and functional transfers.              Learning Progress Summary           Patient Acceptance, E,TB, VU by CB at 2/5/2022 0423   Family Acceptance, E, VU by AG at 2/3/2022 1638      Show all documentation for this point (1)                 Point: Body mechanics (Done)     Description:   Instruct learner(s) on proper positioning and spine alignment during self-care, functional mobility activities and/or exercises.              Learning Progress Summary           Patient Acceptance, E,TB, VU by CB at 2/5/2022 0423   Family Acceptance, E, VU by AG at 2/3/2022 1638      Show all documentation for this point (2)                             User Key     Initials Effective Dates Name Provider Type Discipline     06/16/21 -  Loan Holliday, RN Registered Nurse Nurse     CB 07/28/21 -  Maycol Benavidez, RN Registered Nurse Nurse              OT Recommendation and Plan  Therapy Frequency (OT): 3 times/wk  Plan of Care Review  Plan of Care Reviewed With: patient  Progress: improving  Outcome Summary: Pt seen this date for OT/PT co tx session due to poor activity tolerance with multiple sessions and assist x2 required for OOB activity. Pt required modA for bed mobility. While at EOB, pt engaged in upper trunk strengthening, targeting lateral trunk flexion, foward trunk flexion, and anterior/posterior functional task. Pt completed 2 steps x 10 reps with RBs throughout. Min A for dynamic sitting balance. x1 STS from EOB with max A x2, HHAx2, unable to take steps. Pt will continue to benefit from skilled OT to address goals and deficits.     Time Calculation:    Time Calculation- OT     Row Name 03/03/22 1439             Time Calculation- OT    OT Start Time 1314  -MW      OT Stop Time 1337  -MW      OT Time Calculation (min) 23 min  -MW      Total Timed Code Minutes- OT 23 minute(s)  -MW      OT Received On 03/03/22  -MW      OT - Next Appointment 03/04/22  -MW              Timed Charges    96082 -  OT Neuromuscular Reeducation Minutes 23  -MW              Total Minutes    Timed Charges Total Minutes 23  -MW       Total Minutes 23  -MW            User Key  (r) = Recorded By, (t) = Taken By, (c) = Cosigned By    Initials Name Provider Type    Genny Dominguez OT Occupational Therapist              Therapy Charges for Today     Code Description Service Date Service Provider Modifiers Qty    89344466012 HC OT NEUROMUSC RE EDUCATION EA 15 MIN 3/3/2022 Genny Amado OT GO 2               Genny Amado OT  3/3/2022

## 2022-03-03 NOTE — PROGRESS NOTES
"  Infectious Diseases Progress Note    Donald Hannah MD     [facility]  [place]: 29 days  Patient Identification:  Name: Jose Angel Us  Age: 71 y.o.  Sex: male  :  1950  MRN: [id number]         Primary Care Physician: Tarik Willis APRN            Subjective: Feeling somewhat better.  Interval History: See consultation note.    Objective:    Scheduled Meds:cholestyramine, 1 packet, Oral, BID  collagenase, 1 application, Topical, BID  dilTIAZem, 30 mg, Oral, Q8H  famotidine, 20 mg, Oral, BID AC  metroNIDAZOLE, 500 mg, Intravenous, Q8H  vancomycin, 125 mg, Oral, Q6H      Continuous Infusions:Pharmacy Consult,         Vital signs in last 24 hours:  Temp:  [98 °F (36.7 °C)-98.3 °F (36.8 °C)] 98.3 °F (36.8 °C)  Heart Rate:  [] 98  Resp:  [16] 16  BP: (113-127)/(70-74) 113/74    Intake/Output:    Intake/Output Summary (Last 24 hours) at 3/3/2022 1116  Last data filed at 3/3/2022 0711  Gross per 24 hour   Intake 110 ml   Output 1325 ml   Net -1215 ml       Exam:  /74 (BP Location: Right arm, Patient Position: Lying)   Pulse 98   Temp 98.3 °F (36.8 °C) (Oral)   Resp 16   Ht 172 cm (67.72\")   Wt 51.2 kg (112 lb 14 oz)   SpO2 95%   BMI 17.31 kg/m²   Patient is examined using the personal protective equipment as per guidelines from infection control for this particular patient as enacted.  Hand washing was performed before and after patient interaction.  General Appearance:  Comfortable in no acute distress                          Head:    Normocephalic, without obvious abnormality, atraumatic                           Eyes:    PERRL, conjunctivae/corneas clear, EOM's intact, both eyes                         Throat:   Lips, tongue, gums normal; oral mucosa pink and moist                           Neck:   Supple, symmetrical, trachea midline, no JVD                         Lungs:    Clear to auscultation bilaterally, respirations unlabored                 Chest Wall:    No tenderness or " deformity                          Heart:    Regular rate and rhythm, S1 and S2 normal, no murmur, no rub                                         or gallop                  Abdomen:   Soft nontender                 Extremities:                                  Pulses:   Pulses palpable in all extremities                            Skin:   Skin is warm and dry,  no rashes or palpable lesions                  Neurologic: Awake and interactive       Data Review:    I reviewed the patient's new clinical results.  Results from last 7 days   Lab Units 03/03/22  0555 03/02/22  0635 03/01/22  0516 02/28/22  0546 02/27/22  0956 02/25/22  0554   WBC 10*3/mm3 24.15* 19.68* 15.26* 11.46* 6.85 7.97   HEMOGLOBIN g/dL 10.1* 9.3* 9.5* 10.4* 9.8* 10.0*   PLATELETS 10*3/mm3 589* 497* 516* 494* 452* 302     Results from last 7 days   Lab Units 03/03/22  0555 03/02/22  0635 03/01/22  0516 02/28/22  0546 02/27/22  0139 02/26/22  0559 02/25/22  0554   SODIUM mmol/L 134* 134* 135* 135* 134* 137 135*   POTASSIUM mmol/L 4.0 4.0 3.8 4.2 4.1 4.4 3.9   CHLORIDE mmol/L 101 104 103 104 102 103 102   CO2 mmol/L 28.5 22.9 24.0 21.9* 24.0 21.7* 23.2   BUN mg/dL 16 15 17 22 26* 23 28*   CREATININE mg/dL 0.78 0.77 0.63* 0.80 0.85 0.83 0.79   CALCIUM mg/dL 8.2* 8.1* 7.4* 7.8* 7.6* 8.0* 7.9*   GLUCOSE mg/dL 87 94 97 89 105* 71 98       Microbiology Results (last 10 days)     Procedure Component Value - Date/Time    Clostridium Difficile Toxin - Stool, Per Rectum [[id number]]  (Abnormal) Collected: 02/25/22 0219    Lab Status: Final result Specimen: Stool from Per Rectum Updated: 02/25/22 0601    Narrative:      The following orders were created for panel order Clostridium Difficile Toxin - Stool, Per Rectum.  Procedure                               Abnormality         Status                     ---------                               -----------         ------                     Clostridium Difficile To...[[id number]]  Abnormal            Final result                  Please view results for these tests on the individual orders.    Clostridium Difficile Toxin, PCR - Stool, Per Rectum [[id number]]  (Abnormal) Collected: 02/25/22 0219    Lab Status: Final result Specimen: Stool from Per Rectum Updated: 02/25/22 0601     C. 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  7-other diagnosis per primary team.     Recommendations/Discussions:  · His worsening leukocytosis concerning.  · Check CT scan of the chest abdomen and pelvis to assess for the psoas hematoma that was noted to the time of admission and also to rule out drainable foci.    Donald Hannah MD  3/3/2022  11:16 EST    Much of this encounter note is an electronic transcription/translation of spoken language to printed text. The electronic translation of spoken language may permit erroneous, or at times, nonsensical words or phrases to be inadvertently transcribed; Although I have reviewed the note for such errors, some may still exist

## 2022-03-03 NOTE — PLAN OF CARE
Goal Outcome Evaluation:  Plan of Care Reviewed With: patient        Progress: improving  Outcome Summary: Pt seen this date for OT/PT co tx session due to poor activity tolerance with multiple sessions and assist x2 required for OOB activity. Pt required modA for bed mobility. While at EOB, pt engaged in upper trunk strengthening, targeting lateral trunk flexion, foward trunk flexion, and anterior/posterior functional task. Pt completed 2 steps x 10 reps with RBs throughout. Min A for dynamic sitting balance. x1 STS from EOB with max A x2, HHAx2, unable to take steps. Pt will continue to benefit from skilled OT to address goals and deficits.    Therapist in mask, gloves, glasses, and gown. Hand hygiene performed before and after treatment session.

## 2022-03-03 NOTE — THERAPY TREATMENT NOTE
Acute Care - Speech Language Pathology   Swallow Re-Evaluation [facility]     Patient Name: Jose Angel Us  : 1950  MRN: [id number]  Today's Date: 3/3/2022               Admit Date: 2022    Visit Dx:     ICD-10-CM ICD-9-CM   1. Acute hypoxemic respiratory failure (HCC)  J96.01 518.81   2. COVID-19 virus infection  U07.1 079.89   3. Pneumonia due to COVID-19 virus  U07.1 480.8    J12.82 079.89   4. Intravascular volume depletion  E86.1 276.52   5. CAIR (acute kidney injury) (HCC)  N17.9 584.9   6. Bilateral lower extremity edema  R60.0 782.3   7. 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    • Pneumonia due to COVID-19 virus   • CARI (acute kidney injury) (HCC)   • History of Raynaud's syndrome   • Weight loss   • Severe malnutrition (CMS/HCC)   • Nontraumatic retroperitoneal hematoma     Past Medical History:   Diagnosis Date   • Anemia    • BPH (benign prostatic hyperplasia)    • Hyperlipidemia    • Hypertension      Past Surgical History:   Procedure Laterality Date   • HERNIA REPAIR     • INSERT CENTRAL LINE AT BEDSIDE  2022        • VENA CAVA FILTER INSERTION N/A 2022    Procedure: VENA CAVA FILTER INSERTION;  Surgeon: Ligia Méndez MD;  Location: [facility] OR ;  Service: Vascular;  Laterality: N/A;       SLP Recommendation and Plan  Recommend: regular and thin liquids. medications: with thin liquids. Compensations: small bites/sips, upright for all PO.     SLP to sign off at this time as dysphagia risk appears resolved. Please re-consult via new order if difficulties arise. Pt in agreement.      SWALLOW EVALUATION (last 72 hours)     SLP  "Adult Swallow Evaluation     Row Name 03/03/22 1300       Rehab Evaluation    Document Type re-evaluation  -AB    Subjective Information complains of  \"this food is terrible\"  -AB    Patient Observations alert; cooperative  -AB    Patient/Family/Caregiver Comments/Observations Pt alert, cooperative, intact.  -AB    Patient Effort good  -AB    Symptoms Noted During/After Treatment none  -AB            Pain    Additional Documentation --  does not rate  -AB          User Key  (r) = Recorded By, (t) = Taken By, (c) = Cosigned By    Initials Name Effective Dates    Brigid Desir, MS CCC-SLP 08/01/21 -                 EDUCATION  The patient has been educated in the following areas:   Dysphagia (Swallowing Impairment) Modified Diet Instruction.        SLP GOALS     Row Name 03/03/22 1300       Oral Nutrition/Hydration Goal 1 (SLP)    Oral Nutrition/Hydration Goal 1, SLP Tolerate least restrictive PO diet.  -AB    Time Frame (Oral Nutrition/Hydration Goal 1, SLP) short term goal (STG)  -AB    Barriers (Oral Nutrition/Hydration Goal 1, SLP) RE-EVAL: Bedside swallow re-evaluation completed with PM established lunch. Pt upright and self feeds lasagna, salad, thin tea, thin water. Oral phase unremarkable. Mastication and A-P transport timely and efficient. No residuals. Pharyngeal phase with no overt s/s aspiration across x14 PO trials (solids and thins). Recommend: regular and thin liquids. medications: with thin liquids. Compensations: small bites/sips, upright for all PO. SLP to sign off at this time as dysphagia risk appears resolved. Please re-consult via new order if difficulties arise. Pt in agreement.  -AB    Progress/Outcomes (Oral Nutrition/Hydration Goal 1, SLP) goal met  -AB          User Key  (r) = Recorded By, (t) = Taken By, (c) = Cosigned By    Initials Name Provider Type    Brigid Desir, MS CCC-SLP Speech and Language Pathologist                   Time Calculation:    Time Calculation- SLP     Row " Name 03/03/22 1430 03/03/22 1333          Time Calculation- SLP    SLP Start Time -- 1300  -AB     SLP Received On -- 03/03/22  -AB            Untimed Charges    75555-PG Treatment Swallow Minutes 60  -AB --            Total Minutes    Untimed Charges Total Minutes 60  -AB --      Total Minutes 60  -AB --           User Key  (r) = Recorded By, (t) = Taken By, (c) = Cosigned By    Initials Name Provider Type    AB Brigid Pisano, MS CCC-SLP Speech and Language Pathologist                Therapy Charges for Today     Code Description Service Date Service Provider Modifiers Qty    40295664059  ST TREATMENT SWALLOW 4 3/3/2022 Brigid Pisano, MS CCC-SLP GN 1             PPE:  Patient was not wearing a face mask during this therapy encounter. Therapist used appropriate personal protective equipment including gown, eye protection, mask and gloves.  Mask used was standard procedure mask. Appropriate PPE was worn during the entire therapy session. The patient coughed during this evaluation. Therapist was within 6 feet for 15 minutes or more during the evaluation. Hand hygiene was completed before and after therapy session. Patient is not in enhanced droplet precautions.       Brigid Pisano MS CCC-SLP  3/3/2022

## 2022-03-03 NOTE — CASE MANAGEMENT/SOCIAL WORK
Continued Stay Note  [facility]     Patient Name: Jose Angel Us  MRN: [id number]  Today's Date: 3/3/2022    Admit Date: 2/2/2022     Discharge Plan     Row Name 03/03/22 1141       Plan    Plan Comments Spoke with Kay has pre-cert.  It is good 3/3-3/5. Varsha Tilley RN               Discharge Codes    No documentation.               Expected Discharge Date and Time     Expected Discharge Date Expected Discharge Time    Mar 4, 2022             Varsha Tilley RN

## 2022-03-03 NOTE — PLAN OF CARE
Goal Outcome Evaluation:  Plan of Care Reviewed With: patient           Outcome Summary: Pt agreed to session PT/OT, pt seemed more alert and motivated, followed directions, unable to take step, but stood on RLE today, fatigued by end of session; extensive clean-up several times today, 3 attempts just to see pt ; pt plans SNU at [place]    Patient was wearing a face mask during this therapy encounter. Therapist used appropriate personal protective equipment including eye protection, mask, and gloves.  Mask used was standard procedure mask. Appropriate PPE was worn during the entire therapy session. Hand hygiene was completed before and after therapy session. Patient is not in enhanced droplet precautions.

## 2022-03-03 NOTE — PLAN OF CARE
"Goal Outcome Evaluation:      Antibiotics continued, VSS, no complaints of pain, Q2 turns. Toward end of shift patient complained that he had a burning sensation \"at\" his penis. After discussing this with him he clarified the sensation was actually when he urinates and that he sometimes has the urge to urinate but doesn't. Notified TALIA Jang who placed order for urinalysis. Awaiting urine sample.            "

## 2022-03-03 NOTE — THERAPY TREATMENT NOTE
Patient Name: Jose Angel Us  : 1950    MRN: [id number]                              Today's Date: 3/3/2022       Admit Date: 2022    Visit Dx:     ICD-10-CM ICD-9-CM   1. Acute hypoxemic respiratory failure (HCC)  J96.01 518.81   2. COVID-19 virus infection  U07.1 079.89   3. Pneumonia due to COVID-19 virus  U07.1 480.8    J12.82 079.89   4. Intravascular volume depletion  E86.1 276.52   5. CARI (acute kidney injury) (HCC)  N17.9 584.9   6. Bilateral lower extremity edema  R60.0 782.3   7. 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    • Pneumonia due to COVID-19 virus   • CARI (acute kidney injury) (HCC)   • History of Raynaud's syndrome   • Weight loss   • Severe malnutrition (CMS/HCC)   • Nontraumatic retroperitoneal hematoma     Past Medical History:   Diagnosis Date   • Anemia    • BPH (benign prostatic hyperplasia)    • Hyperlipidemia    • Hypertension      Past Surgical History:   Procedure Laterality Date   • HERNIA REPAIR     • INSERT CENTRAL LINE AT BEDSIDE  2022        • VENA CAVA FILTER INSERTION N/A 2022    Procedure: VENA CAVA FILTER INSERTION;  Surgeon: Ligia Méndez MD;  Location: [facility] ;  Service: Vascular;  Laterality: N/A;      General Information     Row Name 22          Physical Therapy Time and Intention    Document Type therapy note (daily note)  -     Mode of Treatment occupational therapy; physical therapy; co-treatment  -     Row Name 22          General Information    Existing Precautions/Restrictions fall  -     Row Name 22          Cognition    Orientation Status  (Cognition) oriented to; person  -     Row Name 03/03/22 1850          Safety Issues, Functional Mobility    Impairments Affecting Function (Mobility) balance; coordination; endurance/activity tolerance; postural/trunk control; strength; range of motion (ROM)  -           User Key  (r) = Recorded By, (t) = Taken By, (c) = Cosigned By    Initials Name Provider Type    Priya Duvall PTA Physical Therapy Assistant               Mobility     Row Name 03/03/22 1850          Bed Mobility    Bed Mobility supine-sit; sit-supine  -     Supine-Sit [place] (Bed Mobility) verbal cues; nonverbal cues (demo/gesture); moderate assist (50% patient effort)  -     Sit-Supine [place] (Bed Mobility) moderate assist (50% patient effort); verbal cues; nonverbal cues (demo/gesture)  -     Assistive Device (Bed Mobility) bed rails; head of bed elevated  -     Row Name 03/03/22 1850          Bed-Chair Transfer    Bed-Chair [place] (Transfers) not tested  -     Row Name 03/03/22 1850          Sit-Stand Transfer    Sit-Stand Carbon (Transfers) maximum assist (25% patient effort); 2 person assist; verbal cues; nonverbal cues (demo/gesture)  -     Assistive Device (Sit-Stand Transfers) --  HHA-2  -           User Key  (r) = Recorded By, (t) = Taken By, (c) = Cosigned By    Initials Name Provider Type    Priya Duvall PTA Physical Therapy Assistant               Obj/Interventions     Row Name 03/03/22 1851          Motor Skills    Therapeutic Exercise --  worked on EOB activities for ANT/POST trunk control, elbow push-ups x5 reps; LAQS x10 reps w/assist and limited ROM L  -           User Key  (r) = Recorded By, (t) = Taken By, (c) = Cosigned By    Initials Name Provider Type    Priya Duvall PTA Physical Therapy Assistant               Goals/Plan    No documentation.                Clinical Impression     Row Name 03/03/22 1852          Pain Scale: Numbers Pre/Post-Treatment     Pretreatment Pain Rating 0/10 - no pain  -     Posttreatment Pain Rating 0/10 - no pain  -     Row Name 03/03/22 1852          Plan of Care Review    Plan of Care Reviewed With patient  -     Outcome Summary Pt agreed to session PT/OT, pt seemed more alert and motivated, followed directions, unable to take step, but stood on RLE today, fatigued by end of session; extensive clean-up several times today, 3 attempts just to see pt ; pt plans SNU [place]  -     Row Name 03/03/22 1852          Therapy Assessment/Plan (PT)    Rehab Potential (PT) fair, will monitor progress [facility]  -     Criteria for Skilled Interventions Met (PT) yes  -     Row Name 03/03/22 1852          Vital Signs    O2 Delivery Pre Treatment room air  -     Row Name 03/03/22 1852          Positioning and Restraints    Pre-Treatment Position in bed  -     Post Treatment Position bed  -     In Bed supine; call light within reach; encouraged to call for assist; exit alarm on; side rails up x3; notified nsg  has waffle mattress  -           User Key  (r) = Recorded By, (t) = Taken By, (c) = Cosigned By    Initials Name Provider Type    Priya Duvall, TYRA Physical Therapy Assistant               Outcome Measures     Row Name 03/03/22 1856          How much help from another person do you currently need...    Turning from your back to your side while in flat bed without using bedrails? 3  -JM     Moving from lying on back to sitting on the side of a flat bed without bedrails? 2  -JM     Moving to and from a bed to a chair (including a wheelchair)? 1  -JM     Standing up from a chair using your arms (e.g., wheelchair, bedside chair)? 2  -JM     Climbing 3-5 steps with a railing? 1  -JM     To walk in hospital room? 1  -JM     AM-PAC 6 Clicks Score (PT) 10  -     Row Name 03/03/22 1438          Functional Assessment    Outcome Measure Options AM-PAC 6 Clicks Daily Activity (OT)  -           User Key  (r) = Recorded By, (t) =  Taken By, (c) = Cosigned By    Initials Name Provider Type    Priya Duvall PTA Physical Therapy Assistant    Genny Dominguez OT Occupational Therapist                             Physical Therapy Education                 Title: PT OT SLP Therapies (Done)     Topic: Physical Therapy (Done)     Point: Mobility training (Done)     Learning Progress Summary           Patient Acceptance, E,D, VU,NR by  at 3/3/2022 1856   Family Acceptance, E, VU by AG at 2/3/2022 1638      Show all documentation for this point (12)                 Point: Home exercise program (Done)     Learning Progress Summary           Patient Acceptance, E,D, VU,NR by  at 3/3/2022 1856   Family Acceptance, E, VU by AG at 2/3/2022 1638      Show all documentation for this point (9)                 Point: Body mechanics (Done)     Learning Progress Summary           Patient Acceptance, E,D, VU,NR by  at 3/3/2022 1856   Family Acceptance, E, VU by AG at 2/3/2022 1638      Show all documentation for this point (10)                 Point: Precautions (Done)     Learning Progress Summary           Patient Acceptance, E,D, VU,NR by  at 3/3/2022 1856   Family Acceptance, E, VU by AG at 2/3/2022 1638      Show all documentation for this point (10)                             User Key     Initials Effective Dates Name Provider Type [facility] 03/07/18 -  Priya Milner PTA Physical Therapy Assistant PT    AG 06/16/21 -  Loan Holliday, RN Registered Nurse Nurse              PT Recommendation and Plan     Plan of Care Reviewed With: patient  Outcome Summary: Pt agreed to session PT/OT, pt seemed more alert and motivated, followed directions, unable to take step, but stood on RLE today, fatigued by end of session; extensive clean-up several times today, 3 attempts just to see pt ; [place]     Time Calculation:    PT Charges     Row Name 03/03/22 2869             Time Calculation    Start Time 1314  -DOMINGO      Stop  Time 1337  -DOMINGO      Time Calculation (min) 23 min  -DOMINGO      PT Received On 03/03/22  -DOMINGO      PT - Next Appointment 03/07/22  -DOMINGO            User Key  (r) = Recorded By, (t) = Taken By, (c) = Cosigned By    Initials Name Provider Type    Priya Duvall PTA Physical Therapy Assistant              Therapy Charges for Today     Code Description Service Date Service Provider Modifiers Qty    88485761288 HC PT THER PROC EA 15 MIN 3/3/2022 Priya Milner PTA GP 2    05652652301 HC PT THER SUPP EA 15 MIN 3/3/2022 Priya Milner PTA GP 2          PT G-Codes  Outcome Measure Options: AM-PAC 6 Clicks Daily Activity (OT)  AM-PAC 6 Clicks Score (PT): 10  AM-PAC 6 Clicks Score (OT): 12  Modified Richmond Scale: 4 - Moderately severe disability.  Unable to walk without assistance, and unable to attend to own bodily needs without assistance.    Priya Milner PTA  3/3/2022

## 2022-03-03 NOTE — PLAN OF CARE
Goal Outcome Evaluation:  Plan of Care Reviewed With: patient        Progress: no change  Outcome Summary: Bedside swallow re-evaluation completed with PM established lunch. Pt upright and self feeds lasagna, salad, thin tea, thin water. Oral phase unremarkable. Mastication and A-P transport timely and efficient. No residuals. Pharyngeal phase with no overt s/s aspiration across x14 PO trials (solids and thins).     Recommend: regular and thin liquids. medications: with thin liquids. Compensations: small bites/sips, upright for all PO.     SLP to sign off at this time as dysphagia risk appears resolved. Please re-consult via new order if difficulties arise. Pt in agreement.

## 2022-03-04 LAB
ALBUMIN SERPL-MCNC: 2.2 G/DL (ref 3.5–5.2)
ALBUMIN/GLOB SERPL: 0.8 G/DL
ALP SERPL-CCNC: 101 U/L (ref 39–117)
ALT SERPL W P-5'-P-CCNC: 11 U/L (ref 1–41)
ANION GAP SERPL CALCULATED.3IONS-SCNC: 8 MMOL/L (ref 5–15)
AST SERPL-CCNC: 16 U/L (ref 1–40)
BILIRUB SERPL-MCNC: 0.6 MG/DL (ref 0–1.2)
BUN SERPL-MCNC: 16 MG/DL (ref 8–23)
BUN/CREAT SERPL: 19.8 (ref 7–25)
CALCIUM SPEC-SCNC: 8.1 MG/DL (ref 8.6–10.5)
CHLORIDE SERPL-SCNC: 102 MMOL/L (ref 98–107)
CO2 SERPL-SCNC: 25 MMOL/L (ref 22–29)
CREAT SERPL-MCNC: 0.81 MG/DL (ref 0.76–1.27)
DEPRECATED RDW RBC AUTO: 48.9 FL (ref 37–54)
EGFRCR SERPLBLD CKD-EPI 2021: 93.1 ML/MIN/1.73
EOSINOPHIL # BLD MANUAL: 0.27 10*3/MM3 (ref 0–0.4)
EOSINOPHIL NFR BLD MANUAL: 1 % (ref 0.3–6.2)
ERYTHROCYTE [DISTWIDTH] IN BLOOD BY AUTOMATED COUNT: 15.4 % (ref 12.3–15.4)
GLOBULIN UR ELPH-MCNC: 2.9 GM/DL
GLUCOSE SERPL-MCNC: 96 MG/DL (ref 65–99)
HCT VFR BLD AUTO: 32.9 % (ref 37.5–51)
HGB BLD-MCNC: 10.4 G/DL (ref 13–17.7)
LYMPHOCYTES # BLD MANUAL: 1.63 10*3/MM3 (ref 0.7–3.1)
LYMPHOCYTES NFR BLD MANUAL: 5 % (ref 5–12)
MAGNESIUM SERPL-MCNC: 1.8 MG/DL (ref 1.6–2.4)
MCH RBC QN AUTO: 28.1 PG (ref 26.6–33)
MCHC RBC AUTO-ENTMCNC: 31.6 G/DL (ref 31.5–35.7)
MCV RBC AUTO: 88.9 FL (ref 79–97)
METAMYELOCYTES NFR BLD MANUAL: 1 % (ref 0–0)
MONOCYTES # BLD: 1.36 10*3/MM3 (ref 0.1–0.9)
MYELOCYTES NFR BLD MANUAL: 2 % (ref 0–0)
NEUTROPHILS # BLD AUTO: 23.15 10*3/MM3 (ref 1.7–7)
NEUTROPHILS NFR BLD MANUAL: 85 % (ref 42.7–76)
NRBC BLD AUTO-RTO: 0 /100 WBC (ref 0–0.2)
PHOSPHATE SERPL-MCNC: 3.2 MG/DL (ref 2.5–4.5)
PLAT MORPH BLD: NORMAL
PLATELET # BLD AUTO: 540 10*3/MM3 (ref 140–450)
PMV BLD AUTO: 9.1 FL (ref 6–12)
POTASSIUM SERPL-SCNC: 3.7 MMOL/L (ref 3.5–5.2)
PROT SERPL-MCNC: 5.1 G/DL (ref 6–8.5)
RBC # BLD AUTO: 3.7 10*6/MM3 (ref 4.14–5.8)
RBC MORPH BLD: NORMAL
SODIUM SERPL-SCNC: 135 MMOL/L (ref 136–145)
VARIANT LYMPHS NFR BLD MANUAL: 6 % (ref 19.6–45.3)
WBC MORPH BLD: NORMAL
WBC NRBC COR # BLD: 27.24 10*3/MM3 (ref 3.4–10.8)

## 2022-03-04 PROCEDURE — 85007 BL SMEAR W/DIFF WBC COUNT: CPT | Performed by: STUDENT IN AN ORGANIZED HEALTH CARE EDUCATION/TRAINING PROGRAM

## 2022-03-04 PROCEDURE — 84100 ASSAY OF PHOSPHORUS: CPT | Performed by: INTERNAL MEDICINE

## 2022-03-04 PROCEDURE — 25010000002 CEFEPIME PER 500 MG: Performed by: HOSPITALIST

## 2022-03-04 PROCEDURE — 80053 COMPREHEN METABOLIC PANEL: CPT | Performed by: STUDENT IN AN ORGANIZED HEALTH CARE EDUCATION/TRAINING PROGRAM

## 2022-03-04 PROCEDURE — 85025 COMPLETE CBC W/AUTO DIFF WBC: CPT | Performed by: STUDENT IN AN ORGANIZED HEALTH CARE EDUCATION/TRAINING PROGRAM

## 2022-03-04 PROCEDURE — 87040 BLOOD CULTURE FOR BACTERIA: CPT | Performed by: HOSPITALIST

## 2022-03-04 PROCEDURE — 83735 ASSAY OF MAGNESIUM: CPT | Performed by: STUDENT IN AN ORGANIZED HEALTH CARE EDUCATION/TRAINING PROGRAM

## 2022-03-04 RX ORDER — VANCOMYCIN HYDROCHLORIDE 1 G/200ML
1000 INJECTION, SOLUTION INTRAVENOUS
Status: DISCONTINUED | OUTPATIENT
Start: 2022-03-05 | End: 2022-03-05

## 2022-03-04 RX ORDER — SODIUM CHLORIDE, SODIUM LACTATE, POTASSIUM CHLORIDE, CALCIUM CHLORIDE 600; 310; 30; 20 MG/100ML; MG/100ML; MG/100ML; MG/100ML
100 INJECTION, SOLUTION INTRAVENOUS CONTINUOUS
Status: DISCONTINUED | OUTPATIENT
Start: 2022-03-04 | End: 2022-03-07

## 2022-03-04 RX ORDER — VANCOMYCIN HYDROCHLORIDE 1 G/200ML
1000 INJECTION, SOLUTION INTRAVENOUS ONCE
Status: DISCONTINUED | OUTPATIENT
Start: 2022-03-04 | End: 2022-03-06

## 2022-03-04 RX ADMIN — CHOLESTYRAMINE 1 PACKET: 4 POWDER, FOR SUSPENSION ORAL at 10:07

## 2022-03-04 RX ADMIN — FAMOTIDINE 20 MG: 20 TABLET ORAL at 17:21

## 2022-03-04 RX ADMIN — Medication 1 MG: at 21:44

## 2022-03-04 RX ADMIN — DILTIAZEM HYDROCHLORIDE 30 MG: 30 TABLET, FILM COATED ORAL at 05:52

## 2022-03-04 RX ADMIN — COLLAGENASE SANTYL 1 APPLICATION: 250 OINTMENT TOPICAL at 08:12

## 2022-03-04 RX ADMIN — METRONIDAZOLE 500 MG: 500 INJECTION, SOLUTION INTRAVENOUS at 23:25

## 2022-03-04 RX ADMIN — CHOLESTYRAMINE 1 PACKET: 4 POWDER, FOR SUSPENSION ORAL at 23:24

## 2022-03-04 RX ADMIN — SODIUM CHLORIDE, POTASSIUM CHLORIDE, SODIUM LACTATE AND CALCIUM CHLORIDE 100 ML/HR: 600; 310; 30; 20 INJECTION, SOLUTION INTRAVENOUS at 10:07

## 2022-03-04 RX ADMIN — CEFEPIME HYDROCHLORIDE 2 G: 2 INJECTION, POWDER, FOR SOLUTION INTRAVENOUS at 13:23

## 2022-03-04 RX ADMIN — VANCOMYCIN HYDROCHLORIDE 125 MG: 125 CAPSULE ORAL at 03:36

## 2022-03-04 RX ADMIN — VANCOMYCIN HYDROCHLORIDE 125 MG: 125 CAPSULE ORAL at 08:11

## 2022-03-04 RX ADMIN — METRONIDAZOLE 500 MG: 500 INJECTION, SOLUTION INTRAVENOUS at 05:52

## 2022-03-04 RX ADMIN — FAMOTIDINE 20 MG: 20 TABLET ORAL at 08:11

## 2022-03-04 RX ADMIN — COLLAGENASE SANTYL 1 APPLICATION: 250 OINTMENT TOPICAL at 23:25

## 2022-03-04 RX ADMIN — DILTIAZEM HYDROCHLORIDE 30 MG: 30 TABLET, FILM COATED ORAL at 13:21

## 2022-03-04 RX ADMIN — DILTIAZEM HYDROCHLORIDE 30 MG: 30 TABLET, FILM COATED ORAL at 23:24

## 2022-03-04 RX ADMIN — VANCOMYCIN HYDROCHLORIDE 125 MG: 125 CAPSULE ORAL at 23:24

## 2022-03-04 RX ADMIN — CEFEPIME HYDROCHLORIDE 2 G: 2 INJECTION, POWDER, FOR SOLUTION INTRAVENOUS at 21:47

## 2022-03-04 RX ADMIN — SODIUM CHLORIDE, POTASSIUM CHLORIDE, SODIUM LACTATE AND CALCIUM CHLORIDE 250 ML: 600; 310; 30; 20 INJECTION, SOLUTION INTRAVENOUS at 08:11

## 2022-03-04 NOTE — PLAN OF CARE
Goal Outcome Evaluation:  Plan of Care Reviewed With: patient        Progress: no change  Outcome Summary: Pt has had no complaints of pain or discomforts. Dressing change to R foot completed as ordered. WBC count increased to 27.24. Lactate 2.2. 250 bolus given and then LR running continuously. Vasc asked to see angain about amputation, but pt refusing. ABX orderd along w/ blood cultures. Palliative consulted and saw pt this afternoon. Patient agreeable to palliative care. Palliative RN to notify dr. Scanlon. Waiting for orders. VSS. Will continue to monitor.

## 2022-03-04 NOTE — PROGRESS NOTES
[facility]     LOS: 29 days     Subjective:    CC:Weakness - Generalized and Cough    DIET:  Diet Order   Procedures   • Diet Regular; Thin   diarrhea 2-3 times  today    Patient denies any chest pain shortness of air or nausea or vomiting     Objective:    Vital Signs:  Temp:  [97.8 °F (36.6 °C)-98.3 °F (36.8 °C)] 97.8 °F (36.6 °C)  Heart Rate:  [] 92  Resp:  [16-18] 18  BP: (113-129)/(70-74) 129/73    SpO2:  [94 %-98 %] 98 %  on   ;   Device (Oxygen Therapy): room air  Body mass index is 17.31 kg/m².    Physical Exam  Constitutional:       Appearance: Normal appearance.   HENT:      Head: Normocephalic and atraumatic.   Cardiovascular:      Rate and Rhythm: Normal rate and regular rhythm.      Heart sounds: No murmur heard.  No friction rub.   Pulmonary:      Effort: Pulmonary effort is normal.      Breath sounds: Normal breath sounds.   Abdominal:      General: Bowel sounds are normal. There is no distension.      Palpations: Abdomen is soft.      Tenderness: There is no abdominal tenderness.   Musculoskeletal:      Comments: Left in wrapped   Skin:     General: Skin is warm and dry.   Neurological:      Mental Status: He is alert.   Psychiatric:         Mood and Affect: Mood normal.         Behavior: Behavior normal.         Results Review:    Glucose   Date Value Ref Range Status   03/03/2022 87 65 - 99 mg/dL Final   03/02/2022 94 65 - 99 mg/dL Final   03/01/2022 97 65 - 99 mg/dL Final     Results from last 7 days   Lab Units 03/03/22  0555   WBC 10*3/mm3 24.15*   HEMOGLOBIN g/dL 10.1*   HEMATOCRIT % 31.7*   PLATELETS 10*3/mm3 589*     Results from last 7 days   Lab Units 03/03/22  0555   SODIUM mmol/L 134*   POTASSIUM mmol/L 4.0   CHLORIDE mmol/L 101   CO2 mmol/L 28.5   BUN mg/dL 16   CREATININE mg/dL 0.78   CALCIUM mg/dL 8.2*   BILIRUBIN mg/dL 0.6   ALK PHOS U/L 100   ALT (SGPT) U/L 11   AST (SGOT) U/L 18   GLUCOSE mg/dL 87         Results from last 7 days   Lab Units  03/03/22  0555   MAGNESIUM mg/dL 1.8     Results from last 7 days   Lab Units 02/28/22  0546 02/27/22  1535 02/27/22  0139   TROPONIN T ng/mL 0.136* 0.151* 0.131*     Cultures:  No results found for: BLOODCX, URINECX, WOUNDCX, MRSACX, RESPCX, STOOLCX    I have reviewed daily medications and changes in CPOE    Scheduled meds  cholestyramine, 1 packet, Oral, BID  collagenase, 1 application, Topical, BID  dilTIAZem, 30 mg, Oral, Q8H  famotidine, 20 mg, Oral, BID AC  metroNIDAZOLE, 500 mg, Intravenous, Q8H  vancomycin, 125 mg, Oral, Q6H        Pharmacy Consult,       PRN meds  •  acetaminophen **OR** acetaminophen **OR** acetaminophen  •  albuterol sulfate HFA  •  benzonatate  •  calcium carbonate  •  melatonin  •  muscle rub  •  nitroglycerin  •  ondansetron **OR** ondansetron  •  Pharmacy Consult  •  potassium & sodium phosphates **OR** potassium & sodium phosphates  •  [COMPLETED] Insert peripheral IV **AND** sodium chloride        Hypertension    Benign prostatic hyperplasia    Hypercholesterolemia    Acute hypoxemic respiratory failure (HCC)    Acute deep vein thrombosis (DVT) of left lower extremity     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 difficile colitis and now possible sepsis syndrome.  CT scan of the abdomen shows hematoma is improved.  I discussed the case with Dr Hannah.  Also discussed the difficult situation with the patient.  Patient's left foot definitely could be contributing to the infection.  The patient is again adamant that he would not want amputation to the foot even if it means potential loss of life.  -Check pro calcitonin level and lactic acid    C Diff Diarrhea-started on oral vancomycin  -diarrhea  stable, start questran    SVT/ elevated troponin- monitor, cardiology has seen    COVID-19 pneumonia with acute hypoxic respiratory failure: Hypoxemia resolved.     Left foot gangrene/Tibial Occlusion: On presentation.  Vascular surgery input appreciated.  Amputation recommended.  Patient declines    Acute DVT left popliteal peroneal soleal: On presentation.  Status post IVC filter 2/8    Retroperitoneal hematoma/hemorrhagic shock: Initial event associated anticoagulation.  Status post IVC filter.     Anemia-acute blood loss anemia-iron deficiency-anemia chronic disease: Status post transfusion 6 units packed RBCs.  Status post ferric gluconate infusion.  Hemoglobin presently stable.    Thrombocytopenia: HIT antibody was negative.  Consumptive.  Resolved.    Transaminitis: Resolved.    Urinary retention/clot: Santana was replaced for retention. Urology saw during the hospitalization, f/u with urology outpatient    PAF:   Now in NSR. No A/C candidate.     CARI on CKD 3: Secondary to prerenal/ATN/sepsis/retention.  Resolved.  Nephrology input appreciated.      DVT prophylaxis: Patient with retroperitoneal hematoma so no chemoprophylaxis and no SCDs secondary to DVT and vascular disease        Jacob Scanlon MD  03/03/22  19:13 EST

## 2022-03-04 NOTE — CONSULTS
Purpose of the visit was to evaluate for: goals of care/advanced care planning, support for patient/family, hospice referral/discussion, pain/symptom management, withdrawal of interventions, transfer to comfort care bed/unit and comfort care. Spoke with MD and RN as well as patient and family and discussed palliative care, goals of care, care options, resuscitation status, [facility] scattered bed status and discharge options.      Assessment:  Patient is palliative care appropriate for inpatient care given (list diagnosis/symptoms): Sepsis syndrome, left foot gangrene/tibal occlusion with recommended amputation, and retroperitoneal hematoma/hemorrhagic shock.    Recommendations/Plan: transfer to [facility] for palliative care and [facility] evaluation for community based services.    Other Comments: Met with Mr. Us and his sister, Daphne, at the bedside. Upon arrival patient had no complaints of anxiety or pain, he is having numbness in his left foot that causes him some discomfort he says. When discussing goals of care the patient states he will not have his foot amputated. Dr. Hannah entered the room at this point and continued to discuss the patients infections and difficult plan of care without amputating the patients foot. Mr. Us continued to decline amputation, he states he wants to focus on being comfortable. We discussed palliative care and the goals of palliative care and he states he would like to transfer to [facility] to pursue palliative care and symptom management at this time. We discussed that he is not currently having any symptoms and if he continues to be comfortable without medication management we may have to look at outpatient palliative care options such as a nursing facility or home with [facility]. His sister states it would be hard for her to give that care in their home and if Mr. Us needs to discharge she would prefer to use a nursing facility. The patient states he would like to  discontinue all of his current medications except Pepcid and have the palliative care order set for symptom management as needed. I contacted Dr. Scanlon for transfer orders, a [facility] referral, medication changes, and the addition of the palliative care order set.

## 2022-03-04 NOTE — PLAN OF CARE
Goal Outcome Evaluation:               No complaints of pain. Multiple bowel movements. Educated on the importance of turns. Bottom pink and blanchable. Barrier cream and mepilex applied. Male purewick in place. 8 beat run of SVT. Asymptomatic. VSS. Will continue to monitor closely.

## 2022-03-04 NOTE — PROGRESS NOTES
Name: Jose Angel Us ADMIT: 2022   : 1950  PCP: Tarik Willis APRN    MRN: [id number] LOS: 30 days   AGE/SEX: 71 y.o. male  ROOM: [address]    [place], Subsequent Hospital Care    Chief Complaint   Patient presents with   • Weakness - Generalized   • Cough     CC: Foot wound evaluation follow-up.  Subjective     71 y.o. male patient pleasant.  Denies chest pain or shortness of breath.  No pain in the left foot.    Review of Systems    Objective     Scheduled Medications:   cefepime, 2 g, Intravenous, Once  cefepime, 2 g, Intravenous, Q12H  cholestyramine, 1 packet, Oral, BID  collagenase, 1 application, Topical, BID  dilTIAZem, 30 mg, Oral, Q8H  famotidine, 20 mg, Oral, BID AC  metroNIDAZOLE, 500 mg, Intravenous, Q8H  vancomycin, 125 mg, Oral, Q6H  vancomycin, 1,000 mg, Intravenous, Once        Active Infusions:  lactated ringers, 100 mL/hr, Last Rate: 100 mL/hr (22 1007)  Pharmacy Consult,   Pharmacy to dose vancomycin,         As Needed Medications:  •  acetaminophen **OR** acetaminophen **OR** acetaminophen  •  albuterol sulfate HFA  •  benzonatate  •  calcium carbonate  •  melatonin  •  muscle rub  •  nitroglycerin  •  ondansetron **OR** ondansetron  •  Pharmacy Consult  •  Pharmacy to dose vancomycin  •  potassium & sodium phosphates **OR** potassium & sodium phosphates  •  [COMPLETED] Insert peripheral IV **AND** sodium chloride    Vital Signs  Vital Signs Patient Vitals for the past 24 hrs:   BP Temp Temp src Pulse Resp SpO2 Weight   22 0721 133/80 97.7 °F (36.5 °C) Oral 94 18 95 % --   22 0500 -- -- -- -- -- -- 50.9 kg (112 lb 3.4 oz)   22 005 123/69 98.3 °F (36.8 °C) Oral 86 18 94 % --   22 2210 129/88 -- -- -- -- -- --   22 129/68 97.9 °F (36.6 °C) Oral 94 18 96 % --   22 1317 129/73 97.8 °F (36.6 °C) Oral 92 18 98 % --     I/O:  I/O last 3 completed shifts:  In: 220 [P.O.:220]  Out: 1125 [Urine:1125]    Physical Exam:  Physical  Exam  Vitals reviewed.   Constitutional:       Appearance: He is well-developed.   HENT:      Head: Normocephalic and atraumatic.   Eyes:      General: No scleral icterus.        Right eye: No discharge.         Left eye: No discharge.      Comments: Vision grossly intact   Neck:      Trachea: No tracheal deviation.   Pulmonary:      Effort: Pulmonary effort is normal. No respiratory distress.   Abdominal:      General: There is no distension.      Palpations: Abdomen is soft.   Musculoskeletal:      Cervical back: Normal range of motion.   Skin:     General: Skin is warm and dry.   Neurological:      Mental Status: He is alert and oriented to person, place, and time.   Psychiatric:         Behavior: Behavior normal.     Left foot as documented below.    Palpable left femoral and popliteal pulse.    Results Review:     CBC    Results from last 7 days   Lab Units 03/04/22  0524 03/03/22  0555 03/02/22  0635 03/01/22  0516 02/28/22  0546 02/27/22  0956   WBC 10*3/mm3 27.24* 24.15* 19.68* 15.26* 11.46* 6.85   HEMOGLOBIN g/dL 10.4* 10.1* 9.3* 9.5* 10.4* 9.8*   PLATELETS 10*3/mm3 540* 589* 497* 516* 494* 452*     BMP   Results from last 7 days   Lab Units 03/04/22  0524 03/03/22  0555 03/02/22  0635 03/01/22  0516 02/28/22  0546 02/27/22  0139 02/26/22  0559   SODIUM mmol/L 135* 134* 134* 135* 135* 134* 137   POTASSIUM mmol/L 3.7 4.0 4.0 3.8 4.2 4.1 4.4   CHLORIDE mmol/L 102 101 104 103 104 102 103   CO2 mmol/L 25.0 28.5 22.9 24.0 21.9* 24.0 21.7*   BUN mg/dL 16 16 15 17 22 26* 23   CREATININE mg/dL 0.81 0.78 0.77 0.63* 0.80 0.85 0.83   GLUCOSE mg/dL 96 87 94 97 89 105* 71   MAGNESIUM mg/dL 1.8 1.8 1.9 1.8 1.8 1.8  --    PHOSPHORUS mg/dL 3.2 3.1 2.8 2.7 1.8* 2.3* 2.9     Cr Clearance Estimated Creatinine Clearance: 58.5 mL/min (by C-G formula based on SCr of 0.81 mg/dL).  Coag     HbA1C No results found for: HGBA1C  Blood Glucose No results found for: POCGLU  Infection   Results from last 7 days   Lab Units  03/03/22  0555 03/01/22  0516   PROCALCITONIN ng/mL 0.23 0.22     CMP   Results from last 7 days   Lab Units 03/04/22  0524 03/03/22  0555 03/02/22  0635 03/01/22  0516 02/28/22  0546 02/27/22  0139 02/26/22  0559   SODIUM mmol/L 135* 134* 134* 135* 135* 134* 137   POTASSIUM mmol/L 3.7 4.0 4.0 3.8 4.2 4.1 4.4   CHLORIDE mmol/L 102 101 104 103 104 102 103   CO2 mmol/L 25.0 28.5 22.9 24.0 21.9* 24.0 21.7*   BUN mg/dL 16 16 15 17 22 26* 23   CREATININE mg/dL 0.81 0.78 0.77 0.63* 0.80 0.85 0.83   GLUCOSE mg/dL 96 87 94 97 89 105* 71   ALBUMIN g/dL 2.20* 1.70* 1.90* 1.80* 1.40* 2.00* 1.80*   BILIRUBIN mg/dL 0.6 0.6 0.5 0.5 0.5  --   --    ALK PHOS U/L 101 100 94 91 106  --   --    AST (SGOT) U/L 16 18 17 14 22  --   --    ALT (SGPT) U/L 11 11 12 12 15  --   --      ABG      UA    Results from last 7 days   Lab Units 03/03/22  0636   NITRITE UA  Negative     ITALO  No results found for: POCMETH, POCAMPHET, POCBARBITUR, POCBENZO, POCCOCAINE, POCOPIATES, POCOXYCODO, POCPHENCYC, POCPROPOXY, POCTHC, POCTRICYC  Radiology(recent) CT Abdomen Pelvis Without Contrast    Result Date: 3/3/2022  Large retroperitoneal hematoma within the left side of the abdomen and left hemipelvis with intra and extra muscular components showing overall decrease in size and density since the preceding CT dated 02/13/2020. No new hemorrhage is identified. No other acute process is identified in the abdomen or pelvis. There is moderate sigmoid diverticulosis without evidence of diverticulitis. Small bilateral pleural effusions have diminished in size.  This report was finalized on 3/3/2022 5:48 PM by Dr. Jose Angel Whitaker M.D.        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  I do think his foot is nonsalvageable.  I am recommending left below the knee amputation.  Patient is adamantly refusing operation.  Would continue Betadine dressing changes.  We will see again if he change his mind.      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

## 2022-03-04 NOTE — PROGRESS NOTES
"  Infectious Diseases Progress Note    Donald Hannah MD     [facility]  Los: 30 days  Patient Identification:  Name: Jose Angel Us  Age: 71 y.o.  Sex: male  :  1950  MRN: [id number]         Primary Care Physician: Tarik Willis APRN            Subjective: Feels about the same and does not want any surgical intervention for his ischemic and necrotic/gangrenous foot.  Long conversation held between myself patient's family member and the palliative care consultant nurse at the bedside.  It is emphasized that his decline in terms of worsening white blood cell count despite adequate treatment of his identified C. difficile infection is suggestive of significant infectious burden and most likely culprit is gangrenous foot.  Patient is adamant about not wanting any surgical intervention or amputation which is recommended by vascular surgery service.  Interval History: See consultation note.    Objective:    Scheduled Meds:cefepime, 2 g, Intravenous, Once  cefepime, 2 g, Intravenous, Q12H  cholestyramine, 1 packet, Oral, BID  collagenase, 1 application, Topical, BID  dilTIAZem, 30 mg, Oral, Q8H  famotidine, 20 mg, Oral, BID AC  metroNIDAZOLE, 500 mg, Intravenous, Q8H  vancomycin, 125 mg, Oral, Q6H  vancomycin, 1,000 mg, Intravenous, Once      Continuous Infusions:lactated ringers, 100 mL/hr, Last Rate: 100 mL/hr (22 1007)  Pharmacy Consult,   Pharmacy to dose vancomycin,         Vital signs in last 24 hours:  Temp:  [97.7 °F (36.5 °C)-98.3 °F (36.8 °C)] 97.7 °F (36.5 °C)  Heart Rate:  [86-94] 94  Resp:  [18] 18  BP: (123-133)/(68-88) 133/80    Intake/Output:    Intake/Output Summary (Last 24 hours) at 3/4/2022 1242  Last data filed at 3/4/2022 1056  Gross per 24 hour   Intake 1296 ml   Output 900 ml   Net 396 ml       Exam:  /80 (BP Location: Right arm, Patient Position: Lying)   Pulse 94   Temp 97.7 °F (36.5 °C) (Oral)   Resp 18   Ht 172 cm (67.72\")   Wt 50.9 kg (112 lb 3.4 oz)   SpO2 " 95%   BMI 17.21 kg/m²   Patient is examined using the personal protective equipment as per guidelines from infection control for this particular patient as enacted.  Hand washing was performed before and after patient interaction.  General Appearance:  Comfortable in no acute distress                          Head:    Normocephalic, without obvious abnormality, atraumatic                           Eyes:    PERRL, conjunctivae/corneas clear, EOM's intact, both eyes                         Throat:   Lips, tongue, gums normal; oral mucosa pink and moist                           Neck:   Supple, symmetrical, trachea midline, no JVD                         Lungs:    Clear to auscultation bilaterally, respirations unlabored                 Chest Wall:    No tenderness or deformity                          Heart:    Regular rate and rhythm, S1 and S2 normal, no murmur, no rub                                         or gallop                  Abdomen:   Soft nontender                 Extremities:                                    Pulses:   Pulses palpable in all extremities                            Skin: Necrotic changes on the left foot noted.                  Neurologic: Awake and interactive       Data Review:    I reviewed the patient's new clinical results.  Results from last 7 days   Lab Units 03/04/22  0524 03/03/22  0555 03/02/22  0635 03/01/22  0516 02/28/22  0546 02/27/22  0956   WBC 10*3/mm3 27.24* 24.15* 19.68* 15.26* 11.46* 6.85   HEMOGLOBIN g/dL 10.4* 10.1* 9.3* 9.5* 10.4* 9.8*   PLATELETS 10*3/mm3 540* 589* 497* 516* 494* 452*     Results from last 7 days   Lab Units 03/04/22  0524 03/03/22  0555 03/02/22  0635 03/01/22  0516 02/28/22  0546 02/27/22  0139 02/26/22  0559   SODIUM mmol/L 135* 134* 134* 135* 135* 134* 137   POTASSIUM mmol/L 3.7 4.0 4.0 3.8 4.2 4.1 4.4   CHLORIDE mmol/L 102 101 104 103 104 102 103   CO2 mmol/L 25.0 28.5 22.9 24.0 21.9* 24.0 21.7*   BUN mg/dL 16 16 15 17 22 26* 23   CREATININE  mg/dL 0.81 0.78 0.77 0.63* 0.80 0.85 0.83   CALCIUM mg/dL 8.1* 8.2* 8.1* 7.4* 7.8* 7.6* 8.0*   GLUCOSE mg/dL 96 87 94 97 89 105* 71       Microbiology Results (last 10 days)     Procedure Component Value - Date/Time    Clostridium Difficile Toxin - Stool, Per Rectum [[id number]]  (Abnormal) Collected: 02/25/22 0219    Lab Status: Final result Specimen: Stool from Per Rectum Updated: 02/25/22 0601    Narrative:      The following orders were created for panel order Clostridium Difficile Toxin - Stool, Per Rectum.  Procedure                               Abnormality         Status                     ---------                               -----------         ------                     Clostridium Difficile To...[[id number]]  Abnormal            Final result                 Please view results for these tests on the individual orders.    Clostridium Difficile Toxin, PCR - Stool, Per Rectum [[id number]]  (Abnormal) Collected: 02/25/22 0219    Lab Status: Final result Specimen: Stool from Per Rectum Updated: 02/25/22 0601     C. 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 difficile infection versus  2-evolving superimposed secondary bacterial infection at another venue including possibility of line sepsis, versus evolving left groin wound infection versus aspiration pneumonia or UTI  3-reactive leukocytosis due to blood loss  4-recent COVID-19 pneumonia  5-diabetes  thrombosis of the left lower extremity  6-ischemic changes involving the left foot toes.  7-other diagnosis per primary team.     Recommendations/Discussions:  · Continue treatment for C. difficile infection while he is making decision about definitive intervention regarding the gangrene left foot.  · Not unreasonable to consider changing care structure to comfort and palliation as prolonged broad-spectrum antibiotics to address this surgical process will exacerbate underlying C. difficile infection and he would be in the loop of continued decline.  · Discussed with Dr. Scanlon.    Donald Hannah MD  3/4/2022  12:42 EST    Much of this encounter note is an electronic transcription/translation of spoken language to printed text. The electronic translation of spoken language may permit erroneous, or at times, nonsensical words or phrases to be inadvertently transcribed; Although I have reviewed the note for such errors, some may still exist

## 2022-03-04 NOTE — CASE MANAGEMENT/SOCIAL WORK
Continued Stay Note  [facility]     Patient Name: Jose Angel Us  MRN: [id number]  Today's Date: 3/4/2022    Admit Date: 2/2/2022     Discharge Plan     Row Name 03/04/22 1459       Plan    Plan [facility]- will need [facility] pre-cert    Plan Comments Spoke with patient at bedside.  Patient confirms that he plans to go to [facility] @ dc.  Patient not ready for dc at this time and will need a new pre-cert.  Message left for Julián to update.  CCP will follow up on Monday. Packet in CCP office. Varsha Tilley RN               Discharge Codes    No documentation.               Expected Discharge Date and Time     Expected Discharge Date Expected Discharge Time    Mar 9, 2022             Varsha Tilley RN

## 2022-03-04 NOTE — PROGRESS NOTES
[facility]     LOS: 30 days     Subjective:    CC:Weakness - Generalized and Cough    DIET:  Diet Order   Procedures   • Diet Regular; Thin   no cp  No soa    diarrhea 2-3 times  today    Patient denies any chest pain shortness of air or nausea or vomiting     Objective:    Vital Signs:  Temp:  [97.7 °F (36.5 °C)-98.3 °F (36.8 °C)] 97.7 °F (36.5 °C)  Heart Rate:  [86-94] 94  Resp:  [18] 18  BP: (123-133)/(68-88) 133/80    SpO2:  [94 %-98 %] 95 %  on   ;   Device (Oxygen Therapy): room air  Body mass index is 17.21 kg/m².    Physical Exam  Constitutional:       Appearance: Normal appearance.   HENT:      Head: Normocephalic and atraumatic.   Cardiovascular:      Rate and Rhythm: Normal rate and regular rhythm.      Heart sounds: No murmur heard.  No friction rub.   Pulmonary:      Effort: Pulmonary effort is normal.      Breath sounds: Normal breath sounds.   Abdominal:      General: Bowel sounds are normal. There is no distension.      Palpations: Abdomen is soft.      Tenderness: There is no abdominal tenderness.   Musculoskeletal:      Comments: Necrotic left foot half up the foot, dressing taken down, no obvious extending erythema   Skin:     General: Skin is warm and dry.   Neurological:      Mental Status: He is alert.   Psychiatric:         Mood and Affect: Mood normal.         Behavior: Behavior normal.         Results Review:    Glucose   Date Value Ref Range Status   03/04/2022 96 65 - 99 mg/dL Final   03/03/2022 87 65 - 99 mg/dL Final   03/02/2022 94 65 - 99 mg/dL Final     Results from last 7 days   Lab Units 03/04/22  0524   WBC 10*3/mm3 27.24*   HEMOGLOBIN g/dL 10.4*   HEMATOCRIT % 32.9*   PLATELETS 10*3/mm3 540*     Results from last 7 days   Lab Units 03/04/22  0524   SODIUM mmol/L 135*   POTASSIUM mmol/L 3.7   CHLORIDE mmol/L 102   CO2 mmol/L 25.0   BUN mg/dL 16   CREATININE mg/dL 0.81   CALCIUM mg/dL 8.1*   BILIRUBIN mg/dL 0.6   ALK PHOS U/L 101   ALT (SGPT) U/L 11    AST (SGOT) U/L 16   GLUCOSE mg/dL 96         Results from last 7 days   Lab Units 03/04/22  0524   MAGNESIUM mg/dL 1.8     Results from last 7 days   Lab Units 02/28/22  0546 02/27/22  1535 02/27/22  0139   TROPONIN T ng/mL 0.136* 0.151* 0.131*     Cultures:  No results found for: BLOODCX, URINECX, WOUNDCX, MRSACX, RESPCX, STOOLCX    I have reviewed daily medications and changes in CPOE    Scheduled meds  cefepime, 2 g, Intravenous, Once  cefepime, 2 g, Intravenous, Q8H  cholestyramine, 1 packet, Oral, BID  collagenase, 1 application, Topical, BID  dilTIAZem, 30 mg, Oral, Q8H  famotidine, 20 mg, Oral, BID AC  lactated ringers, 250 mL, Intravenous, Once  metroNIDAZOLE, 500 mg, Intravenous, Q8H  vancomycin, 125 mg, Oral, Q6H  Vancomycin Pharmacy Intermittent Dosing, , Does not apply, Daily        lactated ringers, 100 mL/hr  Pharmacy Consult,       PRN meds  •  acetaminophen **OR** acetaminophen **OR** acetaminophen  •  albuterol sulfate HFA  •  benzonatate  •  calcium carbonate  •  melatonin  •  muscle rub  •  nitroglycerin  •  ondansetron **OR** ondansetron  •  Pharmacy Consult  •  potassium & sodium phosphates **OR** potassium & sodium phosphates  •  [COMPLETED] Insert peripheral IV **AND** sodium chloride        Hypertension    Benign prostatic hyperplasia    Hypercholesterolemia    Acute hypoxemic respiratory failure (HCC)    Acute deep vein thrombosis (DVT) of left lower extremity     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 difficile colitis and now possible sepsis syndrome.  CT scan of the abdomen shows hematoma is improved.  I discussed the case with Dr Hannah.  Also discussed the difficult situation with the patient.   Patient's left foot likely source of the WBC elevation.  The patient is reconsidering possible amputation, consult vascular surgery  -pro calcitonin level minimally elevated and lactic acid minimal elevation  -palliative care consult  -start cefepime and vancomycin    C Diff Diarrhea- on oral vancomycin  -diarrhea stable, start questran    SVT/ elevated troponin- monitor, cardiology has seen    COVID-19 pneumonia with acute hypoxic respiratory failure: Hypoxemia resolved.     Left foot gangrene/Tibial Occlusion: On presentation.  Vascular surgery input appreciated.  Amputation recommended.  Patient declines    Acute DVT left popliteal peroneal soleal: On presentation.  Status post IVC filter 2/8    Retroperitoneal hematoma/hemorrhagic shock: Initial event associated anticoagulation.  Status post IVC filter.     Anemia-acute blood loss anemia-iron deficiency-anemia chronic disease: Status post transfusion 6 units packed RBCs.  Status post ferric gluconate infusion.  Hemoglobin presently stable.    Thrombocytopenia: HIT antibody was negative.  Consumptive.  Resolved.    Transaminitis: Resolved.    Urinary retention/clot: Santana was replaced for retention. Urology saw during the hospitalization, f/u with urology outpatient    PAF:   Now in NSR. No A/C candidate.     CARI on CKD 3: Secondary to prerenal/ATN/sepsis/retention.  Resolved.  Nephrology input appreciated.      DVT prophylaxis: Patient with retroperitoneal hematoma so no chemoprophylaxis and no SCDs secondary to DVT and vascular disease        Jacob Scanlon MD  03/04/22  08:19 EST

## 2022-03-04 NOTE — PROGRESS NOTES
"[facility] Clinical Pharmacy Services: Vancomycin Pharmacokinetic Initial Consult Note    Jose Angel Us is a 71 y.o. male who is on day 1 of pharmacy to dose vancomycin.    Indication: bone/joint infxn   Consulting Provider: emile  Planned Duration of Therapy: 10d  Loading Dose Ordered or Given:   MRSA PCR performed:   Culture/Source: bone/joint infxn   Target: -600 mg/L.hr   Other Antimicrobials: cefepime, metronidazole, po vanc     Vitals/Labs  Ht: 172 cm (67.72\"); Wt: 50.9 kg (112 lb 3.4 oz)  Temp Readings from Last 1 Encounters:   03/04/22 97.6 °F (36.4 °C) (Oral)    Estimated Creatinine Clearance: 58.5 mL/min (by C-G formula based on SCr of 0.81 mg/dL).       Results from last 7 days   Lab Units 03/04/22  0524 03/03/22  0555 03/02/22  0635   CREATININE mg/dL 0.81 0.78 0.77   WBC 10*3/mm3 27.24* 24.15* 19.68*     Assessment/Plan:    Vancomycin Dose:   1000 mg IV every  18  hours   Predictive AUC level for the dose ordered is 502 mg/L.hr, which is within the target of 400-600 mg/L.hr  Vanc Trough has been ordered for 3/6 at 0430     Pharmacy will follow patient's kidney function and will adjust doses and obtain levels as necessary. Thank you for involving pharmacy in this patient's care. Please contact pharmacy with any questions or concerns.                           Elizabeth Evans [facility]  Clinical Pharmacist    "

## 2022-03-05 PROCEDURE — 25010000002 CEFEPIME PER 500 MG: Performed by: HOSPITALIST

## 2022-03-05 PROCEDURE — 25010000002 VANCOMYCIN PER 500 MG: Performed by: HOSPITALIST

## 2022-03-05 RX ORDER — VANCOMYCIN HYDROCHLORIDE 1 G/200ML
1000 INJECTION, SOLUTION INTRAVENOUS
Status: DISCONTINUED | OUTPATIENT
Start: 2022-03-05 | End: 2022-03-06

## 2022-03-05 RX ORDER — TEMAZEPAM 15 MG/1
15 CAPSULE ORAL NIGHTLY PRN
Status: DISCONTINUED | OUTPATIENT
Start: 2022-03-05 | End: 2022-03-11 | Stop reason: HOSPADM

## 2022-03-05 RX ADMIN — COLLAGENASE SANTYL 1 APPLICATION: 250 OINTMENT TOPICAL at 11:01

## 2022-03-05 RX ADMIN — METRONIDAZOLE 500 MG: 500 INJECTION, SOLUTION INTRAVENOUS at 14:14

## 2022-03-05 RX ADMIN — FAMOTIDINE 20 MG: 20 TABLET ORAL at 07:51

## 2022-03-05 RX ADMIN — METRONIDAZOLE 500 MG: 500 INJECTION, SOLUTION INTRAVENOUS at 06:36

## 2022-03-05 RX ADMIN — FAMOTIDINE 20 MG: 20 TABLET ORAL at 17:42

## 2022-03-05 RX ADMIN — DILTIAZEM HYDROCHLORIDE 30 MG: 30 TABLET, FILM COATED ORAL at 14:22

## 2022-03-05 RX ADMIN — CHOLESTYRAMINE 1 PACKET: 4 POWDER, FOR SUSPENSION ORAL at 21:56

## 2022-03-05 RX ADMIN — VANCOMYCIN HYDROCHLORIDE 1000 MG: 1 INJECTION, SOLUTION INTRAVENOUS at 11:00

## 2022-03-05 RX ADMIN — DILTIAZEM HYDROCHLORIDE 30 MG: 30 TABLET, FILM COATED ORAL at 06:37

## 2022-03-05 RX ADMIN — VANCOMYCIN HYDROCHLORIDE 125 MG: 125 CAPSULE ORAL at 21:56

## 2022-03-05 RX ADMIN — CEFEPIME HYDROCHLORIDE 2 G: 2 INJECTION, POWDER, FOR SOLUTION INTRAVENOUS at 22:03

## 2022-03-05 RX ADMIN — COLLAGENASE SANTYL 1 APPLICATION: 250 OINTMENT TOPICAL at 21:57

## 2022-03-05 RX ADMIN — DILTIAZEM HYDROCHLORIDE 30 MG: 30 TABLET, FILM COATED ORAL at 21:56

## 2022-03-05 RX ADMIN — CHOLESTYRAMINE 1 PACKET: 4 POWDER, FOR SUSPENSION ORAL at 11:01

## 2022-03-05 RX ADMIN — VANCOMYCIN HYDROCHLORIDE 125 MG: 125 CAPSULE ORAL at 05:03

## 2022-03-05 RX ADMIN — METRONIDAZOLE 500 MG: 500 INJECTION, SOLUTION INTRAVENOUS at 21:56

## 2022-03-05 RX ADMIN — VANCOMYCIN HYDROCHLORIDE 125 MG: 125 CAPSULE ORAL at 15:08

## 2022-03-05 RX ADMIN — VANCOMYCIN HYDROCHLORIDE 125 MG: 125 CAPSULE ORAL at 10:26

## 2022-03-05 RX ADMIN — CEFEPIME HYDROCHLORIDE 2 G: 2 INJECTION, POWDER, FOR SOLUTION INTRAVENOUS at 10:21

## 2022-03-05 NOTE — PLAN OF CARE
Goal Outcome Evaluation:  Plan of Care Reviewed With: patient        Progress: no change  Outcome Summary: Pt transferred to  for palliative care. Pt A&Ox4, able to make needs known. PPS 30%. Pt denies pain, anxiety and SOB at this time. Pt's main concern upon entering new room was turning on the tv. Pt continues on contact spore isolation. Dressing to L foot clean, dry and intact. No needs at this time.

## 2022-03-05 NOTE — PLAN OF CARE
Goal Outcome Evaluation:      Pt had a flat affect throughout the day. He was cooperative in taking all his medication and called when he needed help. His sister came to visit and is trying to help him get his affairs in order. Pt. Is still not needing any pain meds but did request something for sleep tonight Restoril has been prescribed. After tray for lunch was set up the meal somehow landed on the floor. He said it slipped off tray. Gave him 2 yogurts and pt. was happy. Sister would like a consult for HSB and will ask  the  if can put one in will continue to monitor

## 2022-03-05 NOTE — PROGRESS NOTES
"[facility] Clinical Pharmacy Services: Vancomycin Pharmacokinetic Initial Consult Note    Jose Angel Us is a 71 y.o. male who is on day 1 of pharmacy to dose vancomycin.    Indication: bone/joint infxn   Consulting Provider: emile  Planned Duration of Therapy: 10d  Loading Dose Ordered or Given:   MRSA PCR performed:   Culture/Source: bone/joint infxn   Target: -600 mg/L.hr   Other Antimicrobials: cefepime, metronidazole, po vanc     Vitals/Labs  Ht: 172 cm (67.72\"); Wt: 50.9 kg (112 lb 3.4 oz)  Temp Readings from Last 1 Encounters:   03/05/22 97.2 °F (36.2 °C) (Oral)    Estimated Creatinine Clearance: 58.5 mL/min (by C-G formula based on SCr of 0.81 mg/dL).       Results from last 7 days   Lab Units 03/04/22  0524 03/03/22  0555 03/02/22  0635   CREATININE mg/dL 0.81 0.78 0.77   WBC 10*3/mm3 27.24* 24.15* 19.68*     Assessment/Plan:    1. Vancomycin Dose:   1000 mg IV every  18  hours   2. Dose want not administered yesterday. Therefore will retime dose today to early this AM   3. Predictive AUC level for the dose ordered is ~500-520 mg/L.hr, which is within the target of 400-600 mg/L.hr  4. Vanc Trough has been ordered for 3/7 @ 1830    Pharmacy will follow patient's kidney function and will adjust doses and obtain levels as necessary. Thank you for involving pharmacy in this patient's care. Please contact pharmacy with any questions or concerns.                           Farhad Davis, PharmD, [facility]  Clinical Infectious Diseases Pharmacy Specialist       "

## 2022-03-05 NOTE — PROGRESS NOTES
[facility]     LOS: 31 days     Subjective:    CC:Weakness - Generalized and Cough    DIET:  Diet Order   Procedures   • Diet Regular; Thin   No new complaints feeling well today no nausea vomiting diarrhea and appetite is good.    Objective:    Vital Signs:  Temp:  [97.2 °F (36.2 °C)-98.3 °F (36.8 °C)] 97.7 °F (36.5 °C)  Heart Rate:  [] 91  Resp:  [16-18] 18  BP: (120-136)/(66-77) 120/73    SpO2:  [96 %-98 %] 98 %  on   ;   Device (Oxygen Therapy): room air  Body mass index is 17.21 kg/m².    Physical Exam  Constitutional:       Appearance: Normal appearance.   HENT:      Head: Normocephalic and atraumatic.   Cardiovascular:      Rate and Rhythm: Normal rate and regular rhythm.      Heart sounds: No murmur heard.    No friction rub.   Pulmonary:      Effort: Pulmonary effort is normal.      Breath sounds: Normal breath sounds.   Abdominal:      General: Bowel sounds are normal. There is no distension.      Palpations: Abdomen is soft.      Tenderness: There is no abdominal tenderness.   Musculoskeletal:      Comments: Left foot dressed   Skin:     General: Skin is warm and dry.   Neurological:      Mental Status: He is alert.   Psychiatric:         Mood and Affect: Mood normal.         Behavior: Behavior normal.         Results Review:    Glucose   Date Value Ref Range Status   03/04/2022 96 65 - 99 mg/dL Final   03/03/2022 87 65 - 99 mg/dL Final     Results from last 7 days   Lab Units 03/04/22  0524   WBC 10*3/mm3 27.24*   HEMOGLOBIN g/dL 10.4*   HEMATOCRIT % 32.9*   PLATELETS 10*3/mm3 540*     Results from last 7 days   Lab Units 03/04/22  0524   SODIUM mmol/L 135*   POTASSIUM mmol/L 3.7   CHLORIDE mmol/L 102   CO2 mmol/L 25.0   BUN mg/dL 16   CREATININE mg/dL 0.81   CALCIUM mg/dL 8.1*   BILIRUBIN mg/dL 0.6   ALK PHOS U/L 101   ALT (SGPT) U/L 11   AST (SGOT) U/L 16   GLUCOSE mg/dL 96         Results from last 7 days   Lab Units 03/04/22  0524   MAGNESIUM mg/dL 1.8     Results  from last 7 days   Lab Units 02/28/22  0546 02/27/22  1535 02/27/22  0139   TROPONIN T ng/mL 0.136* 0.151* 0.131*     Cultures:  No results found for: BLOODCX, URINECX, WOUNDCX, MRSACX, RESPCX, STOOLCX    I have reviewed daily medications and changes in CPOE    Scheduled meds  cefepime, 2 g, Intravenous, Q12H  cholestyramine, 1 packet, Oral, BID  collagenase, 1 application, Topical, BID  dilTIAZem, 30 mg, Oral, Q8H  famotidine, 20 mg, Oral, BID AC  metroNIDAZOLE, 500 mg, Intravenous, Q8H  vancomycin, 125 mg, Oral, Q6H  vancomycin, 1,000 mg, Intravenous, Once  vancomycin, 1,000 mg, Intravenous, Q18H        lactated ringers, 100 mL/hr, Last Rate: 100 mL/hr (03/04/22 1007)  Pharmacy Consult,   Pharmacy to dose vancomycin,       PRN meds  •  acetaminophen **OR** acetaminophen **OR** acetaminophen  •  albuterol sulfate HFA  •  benzonatate  •  calcium carbonate  •  melatonin  •  muscle rub  •  nitroglycerin  •  ondansetron **OR** ondansetron  •  Pharmacy Consult  •  Pharmacy to dose vancomycin  •  potassium & sodium phosphates **OR** potassium & sodium phosphates  •  [COMPLETED] Insert peripheral IV **AND** sodium chloride  •  temazepam        Hypertension    Benign prostatic hyperplasia    Hypercholesterolemia    Acute hypoxemic respiratory failure (HCC)    Acute deep vein thrombosis (DVT) of left lower extremity     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     Left foot  gangrene/Tibial Occlusion: On presentation.  Vascular surgery input appreciated.  Amputation recommended.  Patient declines    Acute DVT left popliteal peroneal soleal: On presentation.  Status post IVC filter 2/8    Retroperitoneal hematoma/hemorrhagic shock: Initial event associated anticoagulation.  Status post IVC filter.     Anemia-acute blood loss anemia-iron deficiency-anemia chronic disease: Status post transfusion 6 units packed RBCs.  Status post ferric gluconate infusion.  Hemoglobin presently stable.    Thrombocytopenia: HIT antibody was negative.  Consumptive.  Resolved.    Transaminitis: Resolved.    Urinary retention/clot: Santana was replaced for retention. Urology saw during the hospitalization, f/u with urology outpatient    PAF:   Now in NSR. No A/C candidate.     CARI on CKD 3: Secondary to prerenal/ATN/sepsis/retention.  Resolved.  Nephrology input appreciated.      Palliative care to see no DVT prophylaxis at this point, continuing with IV antibiotics for now      Jacob Scanlon MD  03/05/22  16:52 EST

## 2022-03-06 PROCEDURE — 25010000002 CEFEPIME PER 500 MG: Performed by: HOSPITALIST

## 2022-03-06 PROCEDURE — 25010000002 VANCOMYCIN PER 500 MG: Performed by: HOSPITALIST

## 2022-03-06 RX ADMIN — METRONIDAZOLE 500 MG: 500 INJECTION, SOLUTION INTRAVENOUS at 05:55

## 2022-03-06 RX ADMIN — VANCOMYCIN HYDROCHLORIDE 125 MG: 125 CAPSULE ORAL at 03:19

## 2022-03-06 RX ADMIN — CEFEPIME HYDROCHLORIDE 2 G: 2 INJECTION, POWDER, FOR SOLUTION INTRAVENOUS at 21:17

## 2022-03-06 RX ADMIN — CEFEPIME HYDROCHLORIDE 2 G: 2 INJECTION, POWDER, FOR SOLUTION INTRAVENOUS at 09:54

## 2022-03-06 RX ADMIN — DILTIAZEM HYDROCHLORIDE 30 MG: 30 TABLET, FILM COATED ORAL at 21:18

## 2022-03-06 RX ADMIN — COLLAGENASE SANTYL 1 APPLICATION: 250 OINTMENT TOPICAL at 21:20

## 2022-03-06 RX ADMIN — DILTIAZEM HYDROCHLORIDE 30 MG: 30 TABLET, FILM COATED ORAL at 05:55

## 2022-03-06 RX ADMIN — VANCOMYCIN HYDROCHLORIDE 125 MG: 125 CAPSULE ORAL at 16:17

## 2022-03-06 RX ADMIN — METRONIDAZOLE 500 MG: 500 INJECTION, SOLUTION INTRAVENOUS at 14:05

## 2022-03-06 RX ADMIN — COLLAGENASE SANTYL 1 APPLICATION: 250 OINTMENT TOPICAL at 09:54

## 2022-03-06 RX ADMIN — FAMOTIDINE 20 MG: 20 TABLET ORAL at 21:18

## 2022-03-06 RX ADMIN — FAMOTIDINE 20 MG: 20 TABLET ORAL at 09:54

## 2022-03-06 RX ADMIN — VANCOMYCIN HYDROCHLORIDE 1000 MG: 1 INJECTION, SOLUTION INTRAVENOUS at 03:19

## 2022-03-06 RX ADMIN — METRONIDAZOLE 500 MG: 500 INJECTION, SOLUTION INTRAVENOUS at 22:15

## 2022-03-06 RX ADMIN — VANCOMYCIN HYDROCHLORIDE 125 MG: 125 CAPSULE ORAL at 09:54

## 2022-03-06 RX ADMIN — DILTIAZEM HYDROCHLORIDE 30 MG: 30 TABLET, FILM COATED ORAL at 16:17

## 2022-03-06 RX ADMIN — CHOLESTYRAMINE 1 PACKET: 4 POWDER, FOR SUSPENSION ORAL at 22:15

## 2022-03-06 RX ADMIN — CHOLESTYRAMINE 1 PACKET: 4 POWDER, FOR SUSPENSION ORAL at 09:54

## 2022-03-06 RX ADMIN — VANCOMYCIN HYDROCHLORIDE 125 MG: 125 CAPSULE ORAL at 21:17

## 2022-03-06 NOTE — PLAN OF CARE
Goal Outcome Evaluation:   Patient is alert and oriented with periods of confusion. Iv antibiotics given as ordered. No complaints of pain or discomfort. He is incontinent of bowel and bladder.Wet to dry dressing changes to left foot bid. Will monitor.

## 2022-03-06 NOTE — PROGRESS NOTES
"  Infectious Diseases Progress Note    Donald Hannah MD     [facility]  Los: 31 days  Patient Identification:  Name: Jose Angel Us  Age: 71 y.o.  Sex: male  :  1950  MRN: [id number]         Primary Care Physician: Tarik Willis APRN            Subjective: Comfortable.  Interval History: See consultation note.  Transferred to palliative care today  Objective:    Scheduled Meds:cefepime, 2 g, Intravenous, Q12H  cholestyramine, 1 packet, Oral, BID  collagenase, 1 application, Topical, BID  dilTIAZem, 30 mg, Oral, Q8H  famotidine, 20 mg, Oral, BID AC  metroNIDAZOLE, 500 mg, Intravenous, Q8H  vancomycin, 125 mg, Oral, Q6H  vancomycin, 1,000 mg, Intravenous, Once  vancomycin, 1,000 mg, Intravenous, Q18H      Continuous Infusions:lactated ringers, 100 mL/hr, Last Rate: 100 mL/hr (22 1007)  Pharmacy Consult,   Pharmacy to dose vancomycin,         Vital signs in last 24 hours:  Temp:  [97.2 °F (36.2 °C)-97.7 °F (36.5 °C)] 97.7 °F (36.5 °C)  Heart Rate:  [91-93] 91  Resp:  [16-18] 18  BP: (120-125)/(73-77) 120/73    Intake/Output:    Intake/Output Summary (Last 24 hours) at 3/5/2022 2120  Last data filed at 3/5/2022 1300  Gross per 24 hour   Intake 1340 ml   Output --   Net 1340 ml       Exam:  /73 (BP Location: Left arm, Patient Position: Lying)   Pulse 91   Temp 97.7 °F (36.5 °C) (Oral)   Resp 18   Ht 172 cm (67.72\")   Wt 50.9 kg (112 lb 3.4 oz)   SpO2 98% Comment: 9  BMI 17.21 kg/m²   Patient is examined using the personal protective equipment as per guidelines from infection control for this particular patient as enacted.  Hand washing was performed before and after patient interaction.  General Appearance:  Comfortable in no acute distress                          Head:    Normocephalic, without obvious abnormality, atraumatic                           Eyes:    PERRL, conjunctivae/corneas clear, EOM's intact, both eyes                         Throat:   Lips, tongue, gums normal; oral " mucosa pink and moist                           Neck:   Supple, symmetrical, trachea midline, no JVD                         Lungs:    Clear to auscultation bilaterally, respirations unlabored                 Chest Wall:    No tenderness or deformity                          Heart:    Regular rate and rhythm, S1 and S2 normal, no murmur, no rub                                         or gallop                  Abdomen:   Soft nontender                 Extremities:                                    Pulses:   Pulses palpable in all extremities                            Skin: Necrotic changes on the left foot noted.                  Neurologic: Awake and interactive       Data Review:    I reviewed the patient's new clinical results.  Results from last 7 days   Lab Units 03/04/22  0524 03/03/22  0555 03/02/22  0635 03/01/22  0516 02/28/22  0546 02/27/22  0956   WBC 10*3/mm3 27.24* 24.15* 19.68* 15.26* 11.46* 6.85   HEMOGLOBIN g/dL 10.4* 10.1* 9.3* 9.5* 10.4* 9.8*   PLATELETS 10*3/mm3 540* 589* 497* 516* 494* 452*     Results from last 7 days   Lab Units 03/04/22  0524 03/03/22  0555 03/02/22  0635 03/01/22  0516 02/28/22  0546 02/27/22  0139   SODIUM mmol/L 135* 134* 134* 135* 135* 134*   POTASSIUM mmol/L 3.7 4.0 4.0 3.8 4.2 4.1   CHLORIDE mmol/L 102 101 104 103 104 102   CO2 mmol/L 25.0 28.5 22.9 24.0 21.9* 24.0   BUN mg/dL 16 16 15 17 22 26*   CREATININE mg/dL 0.81 0.78 0.77 0.63* 0.80 0.85   CALCIUM mg/dL 8.1* 8.2* 8.1* 7.4* 7.8* 7.6*   GLUCOSE mg/dL 96 87 94 97 89 105*       Microbiology Results (last 10 days)     Procedure Component Value - Date/Time    Blood Culture - Blood, Hand, Left [[id number]]  (Normal) Collected: 03/04/22 1114    Lab Status: Preliminary result Specimen: Blood from Hand, Left Updated: 03/05/22 1133     Blood Culture No growth at 24 hours    Blood Culture - Blood, Arm, Right [[id number]]  (Normal) Collected: 03/04/22 1106    Lab Status: Preliminary result Specimen: Blood from Arm, Right  Updated: 03/05/22 1133     Blood Culture No growth at 24 hours    Clostridium Difficile Toxin - Stool, Per Rectum [[id number]]  (Abnormal) Collected: 02/25/22 0219    Lab Status: Final result Specimen: Stool from Per Rectum Updated: 02/25/22 0601    Narrative:      The following orders were created for panel order Clostridium Difficile Toxin - Stool, Per Rectum.  Procedure                               Abnormality         Status                     ---------                               -----------         ------                     Clostridium Difficile To...[[id number]]  Abnormal            Final result                 Please view results for these tests on the individual orders.    Clostridium Difficile Toxin, PCR - Stool, Per Rectum [[id number]]  (Abnormal) Collected: 02/25/22 0219    Lab Status: Final result Specimen: Stool from Per Rectum Updated: 02/25/22 0601     C. 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  7-other diagnosis per primary  team.     Recommendations/Discussions:  · Continue present care until definite and final palliative care structure is established  · If labs are cannot be done then would recommend discontinuation of IV antibiotics.  · We will discuss with Dr. Scanlon.    Donald Hannah MD  3/5/2022  21:20 EST    Much of this encounter note is an electronic transcription/translation of spoken language to printed text. The electronic translation of spoken language may permit erroneous, or at times, nonsensical words or phrases to be inadvertently transcribed; Although I have reviewed the note for such errors, some may still exist

## 2022-03-06 NOTE — CONSULTS
Patient's sister, Mary called on call  regarding notary services which were not available per [place] consult with Je Hayes. Family will seek legal advice from an .

## 2022-03-06 NOTE — PLAN OF CARE
Goal Outcome Evaluation:      Pt was pleasant and cooperative all day. Family came to visit which cheered pt up. Pt needs to be NPO until he has his MRI tonight. No pain medication needed He is up ad trisha and walks frequently. Will want to have dinner after his test and I told him we would keep it and heat up if needed.

## 2022-03-07 PROCEDURE — 25010000002 CEFEPIME PER 500 MG: Performed by: HOSPITALIST

## 2022-03-07 RX ADMIN — COLLAGENASE SANTYL 1 APPLICATION: 250 OINTMENT TOPICAL at 23:20

## 2022-03-07 RX ADMIN — FAMOTIDINE 20 MG: 20 TABLET ORAL at 08:30

## 2022-03-07 RX ADMIN — METRONIDAZOLE 500 MG: 500 INJECTION, SOLUTION INTRAVENOUS at 13:49

## 2022-03-07 RX ADMIN — CEFEPIME HYDROCHLORIDE 2 G: 2 INJECTION, POWDER, FOR SOLUTION INTRAVENOUS at 08:34

## 2022-03-07 RX ADMIN — DILTIAZEM HYDROCHLORIDE 30 MG: 30 TABLET, FILM COATED ORAL at 21:05

## 2022-03-07 RX ADMIN — DILTIAZEM HYDROCHLORIDE 30 MG: 30 TABLET, FILM COATED ORAL at 05:54

## 2022-03-07 RX ADMIN — CHOLESTYRAMINE 1 PACKET: 4 POWDER, FOR SUSPENSION ORAL at 23:19

## 2022-03-07 RX ADMIN — METRONIDAZOLE 500 MG: 500 INJECTION, SOLUTION INTRAVENOUS at 05:54

## 2022-03-07 RX ADMIN — VANCOMYCIN HYDROCHLORIDE 125 MG: 125 CAPSULE ORAL at 15:57

## 2022-03-07 RX ADMIN — VANCOMYCIN HYDROCHLORIDE 125 MG: 125 CAPSULE ORAL at 03:34

## 2022-03-07 RX ADMIN — TEMAZEPAM 15 MG: 15 CAPSULE ORAL at 00:08

## 2022-03-07 RX ADMIN — FAMOTIDINE 20 MG: 20 TABLET ORAL at 15:57

## 2022-03-07 RX ADMIN — CHOLESTYRAMINE 1 PACKET: 4 POWDER, FOR SUSPENSION ORAL at 09:35

## 2022-03-07 RX ADMIN — COLLAGENASE SANTYL 1 APPLICATION: 250 OINTMENT TOPICAL at 08:34

## 2022-03-07 RX ADMIN — TEMAZEPAM 15 MG: 15 CAPSULE ORAL at 21:04

## 2022-03-07 RX ADMIN — DILTIAZEM HYDROCHLORIDE 30 MG: 30 TABLET, FILM COATED ORAL at 13:49

## 2022-03-07 RX ADMIN — VANCOMYCIN HYDROCHLORIDE 125 MG: 125 CAPSULE ORAL at 21:05

## 2022-03-07 RX ADMIN — VANCOMYCIN HYDROCHLORIDE 125 MG: 125 CAPSULE ORAL at 08:30

## 2022-03-07 NOTE — PROGRESS NOTES
[facility]     LOS: 33 days     Subjective:    CC:Weakness - Generalized and Cough    DIET:  Diet Order   Procedures   • Diet Regular; Thin   No new complaints feeling well today, no nausea, no vomiting, no diarrhea and appetite is good.    Objective:    Vital Signs:  Temp:  [97.2 °F (36.2 °C)-98.3 °F (36.8 °C)] 97.2 °F (36.2 °C)  Heart Rate:  [] 96  Resp:  [20] 20  BP: (117-123)/(66-67) 123/66    SpO2:  [95 %-98 %] 96 %  on   ;   Device (Oxygen Therapy): room air  Body mass index is 17.21 kg/m².    Physical Exam  Constitutional:       Appearance: Normal appearance.   HENT:      Head: Normocephalic and atraumatic.   Cardiovascular:      Rate and Rhythm: Normal rate and regular rhythm.      Heart sounds: No murmur heard.    No friction rub.   Pulmonary:      Effort: Pulmonary effort is normal.      Breath sounds: Normal breath sounds.   Abdominal:      General: Bowel sounds are normal. There is no distension.      Palpations: Abdomen is soft.      Tenderness: There is no abdominal tenderness.   Musculoskeletal:      Comments: Left foot dressed   Skin:     General: Skin is warm and dry.   Neurological:      Mental Status: He is alert.   Psychiatric:         Mood and Affect: Mood normal.         Behavior: Behavior normal.         Results Review:    No results found for: GLUCOSE  Results from last 7 days   Lab Units 03/04/22  0524   WBC 10*3/mm3 27.24*   HEMOGLOBIN g/dL 10.4*   HEMATOCRIT % 32.9*   PLATELETS 10*3/mm3 540*     Results from last 7 days   Lab Units 03/04/22  0524   SODIUM mmol/L 135*   POTASSIUM mmol/L 3.7   CHLORIDE mmol/L 102   CO2 mmol/L 25.0   BUN mg/dL 16   CREATININE mg/dL 0.81   CALCIUM mg/dL 8.1*   BILIRUBIN mg/dL 0.6   ALK PHOS U/L 101   ALT (SGPT) U/L 11   AST (SGOT) U/L 16   GLUCOSE mg/dL 96         Results from last 7 days   Lab Units 03/04/22  0524   MAGNESIUM mg/dL 1.8         Cultures:  No results found for: BLOODCX, URINECX, WOUNDCX, MRSACX, RESPCX,  STOOLCX    I have reviewed daily medications and changes in CPOE    Scheduled meds  cholestyramine, 1 packet, Oral, BID  collagenase, 1 application, Topical, BID  dilTIAZem, 30 mg, Oral, Q8H  famotidine, 20 mg, Oral, BID AC  vancomycin, 125 mg, Oral, Q6H        Pharmacy Consult,       PRN meds  •  acetaminophen **OR** acetaminophen **OR** acetaminophen  •  albuterol sulfate HFA  •  benzonatate  •  calcium carbonate  •  melatonin  •  muscle rub  •  nitroglycerin  •  ondansetron **OR** ondansetron  •  Pharmacy Consult  •  potassium & sodium phosphates **OR** potassium & sodium phosphates  •  [COMPLETED] Insert peripheral IV **AND** sodium chloride  •  temazepam        Hypertension    Benign prostatic hyperplasia    Hypercholesterolemia    Acute hypoxemic respiratory failure (HCC)    Acute deep vein thrombosis (DVT) of left lower extremity     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     Left foot gangrene/Tibial Occlusion: On presentation.  Vascular surgery input appreciated.  Amputation recommended.  Patient declines    Acute DVT left popliteal peroneal soleal: On presentation.  Status post IVC filter 2/8    Retroperitoneal hematoma/hemorrhagic shock: Initial event associated anticoagulation.  Status post IVC filter.     Anemia-acute blood loss anemia-iron deficiency-anemia chronic disease: Status post transfusion 6 units packed RBCs.  Status post ferric  gluconate infusion.  Hemoglobin presently stable.    Thrombocytopenia: HIT antibody was negative.  Consumptive.  Resolved.    Transaminitis: Resolved.    Urinary retention/clot: Santana was replaced for retention. Urology saw during the hospitalization, f/u with urology outpatient    PAF:   Now in NSR. No A/C candidate.     CARI on CKD 3: Secondary to prerenal/ATN/sepsis/retention.  Resolved.  Nephrology input appreciated.      Palliative care, no DVT prophylaxis at this point    Given that we are not checking them.  Vancomycin.    Stop IV cefepime  Continue oral vanc for 10 more days  No more blood work  Hospice consult    Jacob Scanlon MD  03/07/22  17:57 EST

## 2022-03-07 NOTE — PROGRESS NOTES
"  Infectious Diseases Progress Note    Donald Hannah MD     [facility]  Los: 32 days  Patient Identification:  Name: Jose Angel Us  Age: 71 y.o.  Sex: male  :  1950  MRN: [id number]         Primary Care Physician: Tarik Willis APRN            Subjective: feeling better and denies any complaints  Interval History: See consultation note.  Transferred to palliative care today  Objective:    Scheduled Meds:cefepime, 2 g, Intravenous, Q12H  cholestyramine, 1 packet, Oral, BID  collagenase, 1 application, Topical, BID  dilTIAZem, 30 mg, Oral, Q8H  famotidine, 20 mg, Oral, BID AC  metroNIDAZOLE, 500 mg, Intravenous, Q8H  vancomycin, 125 mg, Oral, Q6H      Continuous Infusions:lactated ringers, 100 mL/hr, Last Rate: 100 mL/hr (22 1007)  Pharmacy Consult,   Pharmacy to dose vancomycin,         Vital signs in last 24 hours:  Temp:  [98.3 °F (36.8 °C)] 98.3 °F (36.8 °C)  Heart Rate:  [99] 99  BP: (122)/(66) 122/66    Intake/Output:    Intake/Output Summary (Last 24 hours) at 3/6/2022 2100  Last data filed at 3/6/2022 1843  Gross per 24 hour   Intake 300 ml   Output 353 ml   Net -53 ml       Exam:  /66 (BP Location: Left arm, Patient Position: Lying)   Pulse 99   Temp 98.3 °F (36.8 °C) (Oral)   Resp 18   Ht 172 cm (67.72\")   Wt 50.9 kg (112 lb 3.4 oz)   SpO2 98%   BMI 17.21 kg/m²   Patient is examined using the personal protective equipment as per guidelines from infection control for this particular patient as enacted.  Hand washing was performed before and after patient interaction.  General Appearance:  Comfortable in no acute distress                          Head:    Normocephalic, without obvious abnormality, atraumatic                           Eyes:    PERRL, conjunctivae/corneas clear, EOM's intact, both eyes                         Throat:   Lips, tongue, gums normal; oral mucosa pink and moist                           Neck:   Supple, symmetrical, trachea midline, no JVD         "                 Lungs:    Clear to auscultation bilaterally, respirations unlabored                 Chest Wall:    No tenderness or deformity                          Heart:    Regular rate and rhythm, S1 and S2 normal, no murmur, no rub                                         or gallop                  Abdomen:   Soft nontender                 Extremities:                                    Pulses:   Pulses palpable in all extremities                            Skin: Necrotic changes on the left foot noted.                  Neurologic: Awake and interactive       Data Review:    I reviewed the patient's new clinical results.  Results from last 7 days   Lab Units 03/04/22  0524 03/03/22  0555 03/02/22  0635 03/01/22  0516 02/28/22  0546   WBC 10*3/mm3 27.24* 24.15* 19.68* 15.26* 11.46*   HEMOGLOBIN g/dL 10.4* 10.1* 9.3* 9.5* 10.4*   PLATELETS 10*3/mm3 540* 589* 497* 516* 494*     Results from last 7 days   Lab Units 03/04/22  0524 03/03/22  0555 03/02/22  0635 03/01/22  0516 02/28/22  0546   SODIUM mmol/L 135* 134* 134* 135* 135*   POTASSIUM mmol/L 3.7 4.0 4.0 3.8 4.2   CHLORIDE mmol/L 102 101 104 103 104   CO2 mmol/L 25.0 28.5 22.9 24.0 21.9*   BUN mg/dL 16 16 15 17 22   CREATININE mg/dL 0.81 0.78 0.77 0.63* 0.80   CALCIUM mg/dL 8.1* 8.2* 8.1* 7.4* 7.8*   GLUCOSE mg/dL 96 87 94 97 89       Microbiology Results (last 10 days)     Procedure Component Value - Date/Time    Blood Culture - Blood, Hand, Left [[id number]]  (Normal) Collected: 03/04/22 1114    Lab Status: Preliminary result Specimen: Blood from Hand, Left Updated: 03/06/22 1130     Blood Culture No growth at 2 days    Blood Culture - Blood, Arm, Right [[id number]]  (Normal) Collected: 03/04/22 1106    Lab Status: Preliminary result Specimen: Blood from Arm, Right Updated: 03/06/22 1130     Blood Culture No growth at 2 days    Clostridium Difficile Toxin - Stool, Per Rectum [[id number]]  (Abnormal) Collected: 02/25/22 0219    Lab Status: Final result Specimen:  Stool from Per Rectum Updated: 02/25/22 0601    Narrative:      The following orders were created for panel order Clostridium Difficile Toxin - Stool, Per Rectum.  Procedure                               Abnormality         Status                     ---------                               -----------         ------                     Clostridium Difficile To...[[id number]]  Abnormal            Final result                 Please view results for these tests on the individual orders.    Clostridium Difficile Toxin, PCR - Stool, Per Rectum [[id number]]  (Abnormal) Collected: 02/25/22 0219    Lab Status: Final result Specimen: Stool from Per Rectum Updated: 02/25/22 0601     C. 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  7-other diagnosis per primary team.     Recommendations/Discussions:  · Continue present care until definite and final palliative care structure is established      Donald Hannah MD  3/6/2022  21:00 EST    Much of this encounter note is an electronic  transcription/translation of spoken language to printed text. The electronic translation of spoken language may permit erroneous, or at times, nonsensical words or phrases to be inadvertently transcribed; Although I have reviewed the note for such errors, some may still exist

## 2022-03-07 NOTE — PROGRESS NOTES
Palliative Care Sw met with patient at bedside. Patient voiced goal is to d/c to facility w/hospice. Patient stated that his sister has been visiting him and he has a niece as well for support.  He verbalized being uncomfortable d/c positioning in bed. Offered repositioning, however, patient declined. Patient denied psychosocial or spiritual needs at this time. Advised of availability if needed.

## 2022-03-07 NOTE — PLAN OF CARE
Goal Outcome Evaluation:  Plan of Care Reviewed With: patient        Progress: no change  Outcome Evaluation: No PRN meds. Oral and IV abx cont. BM x4. Threw breakfast and lunch trays on the floor. Dsng change to coccyx and Lt foot. Hospice consulted. Placement pending.

## 2022-03-07 NOTE — CASE MANAGEMENT/SOCIAL WORK
Continued Stay Note  [facility]     Patient Name: Jose Angel Us  MRN: [id number]  Today's Date: 3/7/2022    Admit Date: 2/2/2022     Discharge Plan     Row Name 03/07/22 0747       Plan    Plan Comments Received voicemail from Julián.  If patient is palliative and does not want rehab, they will not be able to accept as palliative because he would be considered LTC and would not have a payer source.  Ana is unable to skill palliative. Varsha WILD RN               Discharge Codes    No documentation.               Expected Discharge Date and Time     Expected Discharge Date Expected Discharge Time    Mar 9, 2022             Varsha Tilley RN

## 2022-03-07 NOTE — PROGRESS NOTES
[facility]     LOS: 32 days     Subjective:    CC:Weakness - Generalized and Cough    DIET:  Diet Order   Procedures   • Diet Regular; Thin   No new complaints feeling well today no nausea vomiting diarrhea and appetite is good.    Objective:    Vital Signs:  Temp:  [98.3 °F (36.8 °C)] 98.3 °F (36.8 °C)  Heart Rate:  [99] 99  BP: (122)/(66) 122/66    SpO2:  [98 %] 98 %  on   ;   Device (Oxygen Therapy): room air  Body mass index is 17.21 kg/m².    Physical Exam  Constitutional:       Appearance: Normal appearance.   HENT:      Head: Normocephalic and atraumatic.   Cardiovascular:      Rate and Rhythm: Normal rate and regular rhythm.      Heart sounds: No murmur heard.    No friction rub.   Pulmonary:      Effort: Pulmonary effort is normal.      Breath sounds: Normal breath sounds.   Abdominal:      General: Bowel sounds are normal. There is no distension.      Palpations: Abdomen is soft.      Tenderness: There is no abdominal tenderness.   Musculoskeletal:      Comments: Left foot dressed   Skin:     General: Skin is warm and dry.   Neurological:      Mental Status: He is alert.   Psychiatric:         Mood and Affect: Mood normal.         Behavior: Behavior normal.         Results Review:    Glucose   Date Value Ref Range Status   03/04/2022 96 65 - 99 mg/dL Final     Results from last 7 days   Lab Units 03/04/22  0524   WBC 10*3/mm3 27.24*   HEMOGLOBIN g/dL 10.4*   HEMATOCRIT % 32.9*   PLATELETS 10*3/mm3 540*     Results from last 7 days   Lab Units 03/04/22  0524   SODIUM mmol/L 135*   POTASSIUM mmol/L 3.7   CHLORIDE mmol/L 102   CO2 mmol/L 25.0   BUN mg/dL 16   CREATININE mg/dL 0.81   CALCIUM mg/dL 8.1*   BILIRUBIN mg/dL 0.6   ALK PHOS U/L 101   ALT (SGPT) U/L 11   AST (SGOT) U/L 16   GLUCOSE mg/dL 96         Results from last 7 days   Lab Units 03/04/22  0524   MAGNESIUM mg/dL 1.8     Results from last 7 days   Lab Units 02/28/22  0546   TROPONIN T ng/mL 0.136*     Cultures:  No  results found for: BLOODCX, URINECX, WOUNDCX, MRSACX, RESPCX, STOOLCX    I have reviewed daily medications and changes in CPOE    Scheduled meds  cefepime, 2 g, Intravenous, Q12H  cholestyramine, 1 packet, Oral, BID  collagenase, 1 application, Topical, BID  dilTIAZem, 30 mg, Oral, Q8H  famotidine, 20 mg, Oral, BID AC  metroNIDAZOLE, 500 mg, Intravenous, Q8H  vancomycin, 125 mg, Oral, Q6H  vancomycin, 1,000 mg, Intravenous, Once  vancomycin, 1,000 mg, Intravenous, Q18H        lactated ringers, 100 mL/hr, Last Rate: 100 mL/hr (03/04/22 1007)  Pharmacy Consult,   Pharmacy to dose vancomycin,       PRN meds  •  acetaminophen **OR** acetaminophen **OR** acetaminophen  •  albuterol sulfate HFA  •  benzonatate  •  calcium carbonate  •  melatonin  •  muscle rub  •  nitroglycerin  •  ondansetron **OR** ondansetron  •  Pharmacy Consult  •  Pharmacy to dose vancomycin  •  potassium & sodium phosphates **OR** potassium & sodium phosphates  •  [COMPLETED] Insert peripheral IV **AND** sodium chloride  •  temazepam        Hypertension    Benign prostatic hyperplasia    Hypercholesterolemia    Acute hypoxemic respiratory failure (HCC)    Acute deep vein thrombosis (DVT) of left lower extremity     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     Left foot gangrene/Tibial Occlusion: On presentation.  Vascular surgery input appreciated.  Amputation recommended.  Patient declines    Acute DVT  left popliteal peroneal soleal: On presentation.  Status post IVC filter 2/8    Retroperitoneal hematoma/hemorrhagic shock: Initial event associated anticoagulation.  Status post IVC filter.     Anemia-acute blood loss anemia-iron deficiency-anemia chronic disease: Status post transfusion 6 units packed RBCs.  Status post ferric gluconate infusion.  Hemoglobin presently stable.    Thrombocytopenia: HIT antibody was negative.  Consumptive.  Resolved.    Transaminitis: Resolved.    Urinary retention/clot: Santana was replaced for retention. Urology saw during the hospitalization, f/u with urology outpatient    PAF:   Now in NSR. No A/C candidate.     CARI on CKD 3: Secondary to prerenal/ATN/sepsis/retention.  Resolved.  Nephrology input appreciated.      Palliative care to see no DVT prophylaxis at this point    Given that we are not checking them.  Vancomycin.  Patient okay with continuing cefepime for now but likely stop shortly.  Continue with oral medication for C. difficile colitis    Jacob Scanlon MD  03/06/22  19:24 EST

## 2022-03-07 NOTE — PLAN OF CARE
Goal Outcome Evaluation:   Patient is alert and oriented times four. Dressing changes to right foot bid. No complaints of pain or discomfort. VSS

## 2022-03-07 NOTE — PROGRESS NOTES
"  Infectious Diseases Progress Note    Donald Hannah MD     [facility]  Los: 33 days  Patient Identification:  Name: Jose Angel Us  Age: 71 y.o.  Sex: male  :  1950  MRN: [id number]         Primary Care Physician: Tarik Willis APRN            Subjective: no new complaints  Interval History: See consultation note.  Transferred to palliative care today  Objective:    Scheduled Meds:cefepime, 2 g, Intravenous, Q12H  cholestyramine, 1 packet, Oral, BID  collagenase, 1 application, Topical, BID  dilTIAZem, 30 mg, Oral, Q8H  famotidine, 20 mg, Oral, BID AC  metroNIDAZOLE, 500 mg, Intravenous, Q8H  vancomycin, 125 mg, Oral, Q6H      Continuous Infusions:lactated ringers, 100 mL/hr, Last Rate: 100 mL/hr (22 1007)  Pharmacy Consult,         Vital signs in last 24 hours:  Temp:  [97.2 °F (36.2 °C)-98.3 °F (36.8 °C)] 97.2 °F (36.2 °C)  Heart Rate:  [] 96  Resp:  [20] 20  BP: (117-123)/(66-67) 123/66    Intake/Output:    Intake/Output Summary (Last 24 hours) at 3/7/2022 1734  Last data filed at 3/7/2022 1728  Gross per 24 hour   Intake 440 ml   Output 1861 ml   Net -1421 ml       Exam:  /66 (BP Location: Left arm, Patient Position: Lying)   Pulse 96   Temp 97.2 °F (36.2 °C) (Oral)   Resp 20   Ht 172 cm (67.72\")   Wt 50.9 kg (112 lb 3.4 oz)   SpO2 96%   BMI 17.21 kg/m²   Patient is examined using the personal protective equipment as per guidelines from infection control for this particular patient as enacted.  Hand washing was performed before and after patient interaction.  General Appearance:  Comfortable in no acute distress                          Head:    Normocephalic, without obvious abnormality, atraumatic                           Eyes:    PERRL, conjunctivae/corneas clear, EOM's intact, both eyes                         Throat:   Lips, tongue, gums normal; oral mucosa pink and moist                           Neck:   Supple, symmetrical, trachea midline, no JVD               "           Lungs:    Clear to auscultation bilaterally, respirations unlabored                 Chest Wall:    No tenderness or deformity                          Heart:    Regular rate and rhythm, S1 and S2 normal, no murmur, no rub                                         or gallop                  Abdomen:   Soft nontender                 Extremities:                                    Pulses:   Pulses palpable in all extremities                            Skin: Necrotic changes on the left foot noted.                  Neurologic: Awake and interactive       Data Review:    I reviewed the patient's new clinical results.  Results from last 7 days   Lab Units 03/04/22 0524 03/03/22  0555 03/02/22  0635 03/01/22  0516   WBC 10*3/mm3 27.24* 24.15* 19.68* 15.26*   HEMOGLOBIN g/dL 10.4* 10.1* 9.3* 9.5*   PLATELETS 10*3/mm3 540* 589* 497* 516*     Results from last 7 days   Lab Units 03/04/22 0524 03/03/22 0555 03/02/22  0635 03/01/22  0516   SODIUM mmol/L 135* 134* 134* 135*   POTASSIUM mmol/L 3.7 4.0 4.0 3.8   CHLORIDE mmol/L 102 101 104 103   CO2 mmol/L 25.0 28.5 22.9 24.0   BUN mg/dL 16 16 15 17   CREATININE mg/dL 0.81 0.78 0.77 0.63*   CALCIUM mg/dL 8.1* 8.2* 8.1* 7.4*   GLUCOSE mg/dL 96 87 94 97       Microbiology Results (last 10 days)     Procedure Component Value - Date/Time    Blood Culture - Blood, Hand, Left [[id number]]  (Normal) Collected: 03/04/22 1114    Lab Status: Preliminary result Specimen: Blood from Hand, Left Updated: 03/07/22 1132     Blood Culture No growth at 3 days    Blood Culture - Blood, Arm, Right [[id number]]  (Normal) Collected: 03/04/22 1106    Lab Status: Preliminary result Specimen: Blood from Arm, Right Updated: 03/07/22 1132     Blood Culture No growth at 3 days          Assessment:    Hypertension    Benign prostatic hyperplasia    Hypercholesterolemia    Acute hypoxemic respiratory failure (HCC)    Acute deep vein thrombosis (DVT) of left lower extremity     Thrombosis of artery of  left lower extremity     Pneumonia due to COVID-19 virus    CARI (acute kidney injury) (HCC)    History of Raynaud's syndrome    Weight loss    Severe malnutrition (CMS/HCC)    Nontraumatic retroperitoneal hematoma  1-possible clinical radiological evolving ileus contributing to ineffectiveness of oral vancomycin and hence progression of underlying C. 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  7-other diagnosis per primary team.     Recommendations/Discussions:  · Continue present care until definite and final palliative care structure is established  · Discussed with Dr. Capo Hannah MD  3/7/2022  17:34 EST    Much of this encounter note is an electronic transcription/translation of spoken language to printed text. The electronic translation of spoken language may permit erroneous, or at times, nonsensical words or phrases to be inadvertently transcribed; Although I have reviewed the note for such errors, some may still exist

## 2022-03-08 RX ADMIN — FAMOTIDINE 20 MG: 20 TABLET ORAL at 17:40

## 2022-03-08 RX ADMIN — VANCOMYCIN HYDROCHLORIDE 125 MG: 125 CAPSULE ORAL at 21:49

## 2022-03-08 RX ADMIN — VANCOMYCIN HYDROCHLORIDE 125 MG: 125 CAPSULE ORAL at 09:55

## 2022-03-08 RX ADMIN — DILTIAZEM HYDROCHLORIDE 30 MG: 30 TABLET, FILM COATED ORAL at 21:49

## 2022-03-08 RX ADMIN — VANCOMYCIN HYDROCHLORIDE 125 MG: 125 CAPSULE ORAL at 02:51

## 2022-03-08 RX ADMIN — DILTIAZEM HYDROCHLORIDE 30 MG: 30 TABLET, FILM COATED ORAL at 15:00

## 2022-03-08 RX ADMIN — CHOLESTYRAMINE 1 PACKET: 4 POWDER, FOR SUSPENSION ORAL at 21:49

## 2022-03-08 RX ADMIN — VANCOMYCIN HYDROCHLORIDE 125 MG: 125 CAPSULE ORAL at 15:00

## 2022-03-08 RX ADMIN — CHOLESTYRAMINE 1 PACKET: 4 POWDER, FOR SUSPENSION ORAL at 09:55

## 2022-03-08 RX ADMIN — COLLAGENASE SANTYL 1 APPLICATION: 250 OINTMENT TOPICAL at 09:55

## 2022-03-08 RX ADMIN — FAMOTIDINE 20 MG: 20 TABLET ORAL at 09:55

## 2022-03-08 RX ADMIN — DILTIAZEM HYDROCHLORIDE 30 MG: 30 TABLET, FILM COATED ORAL at 06:11

## 2022-03-08 RX ADMIN — TEMAZEPAM 15 MG: 15 CAPSULE ORAL at 23:42

## 2022-03-08 NOTE — PROGRESS NOTES
Name: Jose Angel Us ADMIT: 2022   : 1950  PCP: WillisTarik diaz APRN    MRN: [id number] LOS: 34 days   AGE/SEX: 71 y.o. male  ROOM: [facility]     Subjective   Subjective   CC: generalized weakness  No acute events. Patient has no new complaints. Denies pain. No dyspnea/f/c/n/v/d. Taking PO.     Objective   Objective   Vital Signs  Temp:  [97 °F (36.1 °C)-97.2 °F (36.2 °C)] 97 °F (36.1 °C)  Heart Rate:  [89-96] 89  Resp:  [18-20] 18  BP: (123-136)/(66-81) 136/81  SpO2:  [96 %-98 %] 98 %  on   ;   Device (Oxygen Therapy): room air  Body mass index is 17.21 kg/m².  Physical Exam  Vitals and nursing note reviewed.   Constitutional:       General: He is not in acute distress.     Appearance: He is not toxic-appearing.   HENT:      Head: Normocephalic and atraumatic.      Nose: Nose normal.      Mouth/Throat:      Mouth: Mucous membranes are moist.      Pharynx: Oropharynx is clear.   Eyes:      Extraocular Movements: Extraocular movements intact.      Conjunctiva/sclera: Conjunctivae normal.      Pupils: Pupils are equal, round, and reactive to light.   Cardiovascular:      Rate and Rhythm: Normal rate and regular rhythm.      Pulses: Normal pulses.   Abdominal:      General: Bowel sounds are normal. There is no distension.      Palpations: Abdomen is soft.      Tenderness: There is no abdominal tenderness.   Musculoskeletal:         General: No swelling or tenderness.      Cervical back: Normal range of motion and neck supple.      Comments: Left foot dressed   Skin:     General: Skin is warm and dry.      Capillary Refill: Capillary refill takes less than 2 seconds.   Neurological:      General: No focal deficit present.      Mental Status: He is alert and oriented to person, place, and time.   Psychiatric:         Mood and Affect: Mood normal.         Behavior: Behavior normal.       Results Review     I reviewed the patient's new clinical results.  Results from last 7 days   Lab Units 22  3650  03/03/22  0555 03/02/22  0635   WBC 10*3/mm3 27.24* 24.15* 19.68*   HEMOGLOBIN g/dL 10.4* 10.1* 9.3*   PLATELETS 10*3/mm3 540* 589* 497*     Results from last 7 days   Lab Units 03/04/22 0524 03/03/22  0555 03/02/22  0635   SODIUM mmol/L 135* 134* 134*   POTASSIUM mmol/L 3.7 4.0 4.0   CHLORIDE mmol/L 102 101 104   CO2 mmol/L 25.0 28.5 22.9   BUN mg/dL 16 16 15   CREATININE mg/dL 0.81 0.78 0.77   GLUCOSE mg/dL 96 87 94     Results from last 7 days   Lab Units 03/04/22 0524 03/03/22  0555 03/02/22  0635   ALBUMIN g/dL 2.20* 1.70* 1.90*   BILIRUBIN mg/dL 0.6 0.6 0.5   ALK PHOS U/L 101 100 94   AST (SGOT) U/L 16 18 17   ALT (SGPT) U/L 11 11 12     Results from last 7 days   Lab Units 03/04/22 0524 03/03/22  0555 03/02/22  0635   CALCIUM mg/dL 8.1* 8.2* 8.1*   ALBUMIN g/dL 2.20* 1.70* 1.90*   MAGNESIUM mg/dL 1.8 1.8 1.9   PHOSPHORUS mg/dL 3.2 3.1 2.8     Results from last 7 days   Lab Units 03/03/22 1953 03/03/22  0555   PROCALCITONIN ng/mL  --  0.23   LACTATE mmol/L 2.2*  --      COVID19   Date Value Ref Range Status   02/02/2022 Detected (C) Not Detected - Ref. Range Final     No results found for: HGBA1C, POCGLU    CT Abdomen Pelvis Without Contrast  Narrative: CT ABDOMEN PELVIS WO CONTRAST-     CLINICAL HISTORY: Follow-up hematoma     TECHNIQUE: Viral CT images were acquired through the abdomen and pelvis  with no oral or IV contrast and were reconstructed in 3 mm thick slices.     Radiation dose reduction techniques were utilized, including automated  exposure control and exposure modulation based on body size.     COMPARISON: CT scan of the abdomen and pelvis dated 02/13/2022.     FINDINGS: Again seen is a complex retroperitoneal hematoma within the  left flank region of the abdomen extending to the left hemipelvis that  shows overall decrease in size. The hematoma has intramuscular and  external muscular components. The extra muscular component measures 10.6  x 8.7 x 6.8 cm. Previously it measured 13.4 x 11.4  x 7.1 cm. The  intramuscular portion of the hematoma within the left iliac is muscle  and left psoas muscle has also diminished in overall size and density in  the interval. No new hemorrhage is evident. No intraperitoneal blood is  identified. The liver, spleen, pancreas, and adrenal glands appear  within normal limits. The left kidney is anteriorly displaced by the  hematoma but is otherwise unremarkable. The degree of renal displacement  has diminished along with the size of the hematoma. The right kidney is  unremarkable. There is no hydronephrosis. An inferior vena cava filter  remains in satisfactory position without change. The stomach and small  bowel appear normal. There is no bowel distention. There are multiple  diverticuli in the sigmoid colon. There is no CT evidence of acute  diverticulitis. Images through the lung bases demonstrate small  bilateral pleural effusions that have diminished in size slightly. There  is peribronchial thickening and patchy reticulonodular opacities in the  inferior aspect of the right lung that are unchanged, and are quite  likely sequela of chronic bronchitis.     Impression: Large retroperitoneal hematoma within the left side of the  abdomen and left hemipelvis with intra and extra muscular components  showing overall decrease in size and density since the preceding CT  dated 02/13/2020.  No new hemorrhage is identified. No other acute process is identified in  the abdomen or pelvis. There is moderate sigmoid diverticulosis without  evidence of diverticulitis. Small bilateral pleural effusions have  diminished in size.     This report was finalized on 3/3/2022 5:48 PM by Dr. Jose Angel Whitaker M.D.       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   Necrotic left foot with sepsis  - due to arterial occlusion  - treated with cefepime and vancomycin but this has recurred  - patient refuses amputation    C Diff Diarrhea  - much improved  - continue on oral vancomycin and cholestyramine     COVID-19 pneumonia with acute hypoxic respiratory failure  - hypoxemia resolved  - treated with decadron     Left foot gangrene/Tibial Occlusion  - present on admission  - was placed on anticoagulation but he developed retroperitoneal hematoma so this had to be discontinued  - appreciate vascular surgery recs, they recommended amputation but he declined this     Acute DVT left popliteal peroneal soleal  - present on admission  - status post IVC filter 2/8     Retroperitoneal hematoma/hemorrhagic shock  - Initial event associated with anticoagulation which is now stopped  - Status post IVC filter.      Anemia  - due to acute blood loss superimposed on anemia of inflammation/chronic disease  - Status post transfusion 6 units packed RBCs and ferric gluconate infusion  - no evidence of ongoing bleeding     Thrombocytopenia  - consumptive due to hemorrhage, now resolved  - HIT antibody negative     Transaminitis: Resolved.     Urinary retention/clot  - Santana was replaced for  retention. Urology saw during the hospitalization  - hematuria resolved     PAF  - now in NSR  - not an A/C candidate      CARI on CKD 3  - Secondary to prerenal/ATN/sepsis/retention  - resolved, appreciate nephrology recs    Continue palliative measures    DNR.  Discussed with patient and nursing staff.  Anticipate discharge TBD once arrangements have been made.      Indio Herrera MD  [facility]  03/08/22  11:07 EST

## 2022-03-08 NOTE — PROGRESS NOTES
"  Infectious Diseases Progress Note    Donald Hannah MD     [facility]  Los: 34 days  Patient Identification:  Name: Jose Angel Us  Age: 71 y.o.  Sex: male  :  1950  MRN: [id number]         Primary Care Physician: Tarik Willis APRN            Subjective: No new complaints.  Denies any fever and chills.  Diarrhea is better.  Interval History: See consultation note.    Objective:    Scheduled Meds:cholestyramine, 1 packet, Oral, BID  [START ON 3/9/2022] collagenase, 1 application, Topical, Q24H  dilTIAZem, 30 mg, Oral, Q8H  famotidine, 20 mg, Oral, BID AC  vancomycin, 125 mg, Oral, Q6H      Continuous Infusions:Pharmacy Consult,         Vital signs in last 24 hours:  Temp:  [97 °F (36.1 °C)-97.2 °F (36.2 °C)] 97 °F (36.1 °C)  Heart Rate:  [89-96] 89  Resp:  [18-20] 18  BP: (123-136)/(66-81) 136/81    Intake/Output:    Intake/Output Summary (Last 24 hours) at 3/8/2022 1437  Last data filed at 3/8/2022 0628  Gross per 24 hour   Intake 800 ml   Output 1550 ml   Net -750 ml       Exam:  /81 (BP Location: Left arm, Patient Position: Sitting)   Pulse 89   Temp 97 °F (36.1 °C) (Oral)   Resp 18   Ht 172 cm (67.72\")   Wt 50.9 kg (112 lb 3.4 oz)   SpO2 98%   BMI 17.21 kg/m²   Patient is examined using the personal protective equipment as per guidelines from infection control for this particular patient as enacted.  Hand washing was performed before and after patient interaction.  General Appearance:  Comfortable in no acute distress                          Head:    Normocephalic, without obvious abnormality, atraumatic                           Eyes:    PERRL, conjunctivae/corneas clear, EOM's intact, both eyes                         Throat:   Lips, tongue, gums normal; oral mucosa pink and moist                           Neck:   Supple, symmetrical, trachea midline, no JVD                         Lungs:    Clear to auscultation bilaterally, respirations unlabored                 Chest Wall:   "  No tenderness or deformity                          Heart:    Regular rate and rhythm, S1 and S2 normal, no murmur, no rub                                         or gallop                  Abdomen:   Soft nontender                 Extremities:                                    Pulses:   Pulses palpable in all extremities                            Skin: Necrotic changes on the left foot noted.                  Neurologic: Awake and interactive       Data Review:    I reviewed the patient's new clinical results.  Results from last 7 days   Lab Units 03/04/22 0524 03/03/22  0555 03/02/22  0635   WBC 10*3/mm3 27.24* 24.15* 19.68*   HEMOGLOBIN g/dL 10.4* 10.1* 9.3*   PLATELETS 10*3/mm3 540* 589* 497*     Results from last 7 days   Lab Units 03/04/22 0524 03/03/22  0555 03/02/22  0635   SODIUM mmol/L 135* 134* 134*   POTASSIUM mmol/L 3.7 4.0 4.0   CHLORIDE mmol/L 102 101 104   CO2 mmol/L 25.0 28.5 22.9   BUN mg/dL 16 16 15   CREATININE mg/dL 0.81 0.78 0.77   CALCIUM mg/dL 8.1* 8.2* 8.1*   GLUCOSE mg/dL 96 87 94       Microbiology Results (last 10 days)     Procedure Component Value - Date/Time    Blood Culture - Blood, Hand, Left [[id number]]  (Normal) Collected: 03/04/22 1114    Lab Status: Preliminary result Specimen: Blood from Hand, Left Updated: 03/08/22 1132     Blood Culture No growth at 4 days    Blood Culture - Blood, Arm, Right [[id number]]  (Normal) Collected: 03/04/22 1106    Lab Status: Preliminary result Specimen: Blood from Arm, Right Updated: 03/08/22 1132     Blood Culture No growth at 4 days          Assessment:    Hypertension    Benign prostatic hyperplasia    Hypercholesterolemia    Acute hypoxemic respiratory failure (HCC)    Acute deep vein thrombosis (DVT) of left lower extremity     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 difficile infection versus  2-evolving superimposed secondary bacterial infection at another venue including possibility of line sepsis, versus evolving left groin wound infection versus aspiration pneumonia or UTI  3-reactive leukocytosis due to blood loss  4-recent COVID-19 pneumonia  5-ischemic thrombosis of the left lower extremity  6-ischemic changes involving the left foot toes.  7-other diagnosis per primary team.     Recommendations/Discussions:  · Continue with oral vancomycin  · Continue with comfort and palliative care and symptom management program.  · Nothing further to add as he is off of systemic antibiotic therapy while being managed symptomatically for his gangrenous left lower extremity.      Donald Hannah MD  3/8/2022  14:37 EST    Much of this encounter note is an electronic transcription/translation of spoken language to printed text. The electronic translation of spoken language may permit erroneous, or at times, nonsensical words or phrases to be inadvertently transcribed; Although I have reviewed the note for such errors, some may still exist

## 2022-03-09 LAB
BACTERIA SPEC AEROBE CULT: NORMAL
BACTERIA SPEC AEROBE CULT: NORMAL

## 2022-03-09 RX ADMIN — FAMOTIDINE 20 MG: 20 TABLET ORAL at 16:41

## 2022-03-09 RX ADMIN — Medication 1 MG: at 00:57

## 2022-03-09 RX ADMIN — VANCOMYCIN HYDROCHLORIDE 125 MG: 125 CAPSULE ORAL at 15:41

## 2022-03-09 RX ADMIN — VANCOMYCIN HYDROCHLORIDE 125 MG: 125 CAPSULE ORAL at 21:33

## 2022-03-09 RX ADMIN — VANCOMYCIN HYDROCHLORIDE 125 MG: 125 CAPSULE ORAL at 09:09

## 2022-03-09 RX ADMIN — VANCOMYCIN HYDROCHLORIDE 125 MG: 125 CAPSULE ORAL at 02:55

## 2022-03-09 RX ADMIN — FAMOTIDINE 20 MG: 20 TABLET ORAL at 09:09

## 2022-03-09 RX ADMIN — DILTIAZEM HYDROCHLORIDE 30 MG: 30 TABLET, FILM COATED ORAL at 21:33

## 2022-03-09 RX ADMIN — CHOLESTYRAMINE 1 PACKET: 4 POWDER, FOR SUSPENSION ORAL at 12:01

## 2022-03-09 RX ADMIN — DILTIAZEM HYDROCHLORIDE 30 MG: 30 TABLET, FILM COATED ORAL at 15:41

## 2022-03-09 RX ADMIN — DILTIAZEM HYDROCHLORIDE 30 MG: 30 TABLET, FILM COATED ORAL at 09:09

## 2022-03-09 RX ADMIN — COLLAGENASE SANTYL 1 APPLICATION: 250 OINTMENT TOPICAL at 09:10

## 2022-03-09 RX ADMIN — CHOLESTYRAMINE 1 PACKET: 4 POWDER, FOR SUSPENSION ORAL at 21:33

## 2022-03-09 NOTE — PROGRESS NOTES
Discharge Planning Assessment  [facility]     Patient Name: Jose Angel Us  MRN: [id number]  Today's Date: 3/9/2022    Admit Date: 2/2/2022     Discharge Needs Assessment    No documentation.                Discharge Plan     Row Name 03/09/22 1221       Plan    Plan Comments Called Jenny with [facility] and she has no medicaid beds pending available. Called Terrance with [facility] and she will evaluate for her facilities and will call CCP back regarding if they will have any medicaid beds available. Spoke with the patient's sister by phone and confirmed he will need a medicaid bed pending at discharge and for [facility] to follow at the facility. EDWIN Smalls RN, CCP.    Row Name 03/09/22 1113       Plan    Plan Comments Placed call to patient's sister/Daphne and left her a message regarding discharge planning. EDWIN Smalls RN, CCP.              Continued Care and Services - Admitted Since 2/2/2022     Destination Coordination complete.    Service Provider Request Status Selected Services Address Phone Fax Patient Preferred    [facility]   Selected Skilled Nursing [address] [facility] [address] [phone] --    [facility]  Pending - Request Sent N/A [address] [facility] [address] [phone] --    [facility]  Pending - Request Sent N/A [address] [facility] [address] [phone] --    [facility]  Pending - Request Sent N/A [address] [facility] [address] [phone] --    [facility]  Pending - Request Sent N/A [address] [facility] [address] [phone] --    [facility]  Pending - Request Sent N/A [address] [facility] [address] [phone] --    [facility]ILITATION  Pending - Request Sent N/A [address] [facility] [address] [phone] --     [facility]  Pending - Request Sent N/A [address] [facility] [address] [phone] --    [facility]  Pending - Request Sent N/A [address] [facility] [address] [phone] --    [facility]  Pending - Request Sent N/A [address] [facility] [address] [phone] --    [facility]  Pending - Request Sent N/A [address] [facility] [id number] [phone] --    [facility]  Pending - Request Sent N/A [address] [facility] [address] [phone] --    [facility]  Declined  No Medicaid Bed Available N/A [address] [facility] [address] [phone] --    [facility]  Declined  No Bed Available N/A [address] [facility] [address] [phone] --    [facility]  Declined  unable to accept due to no covid-19 vaccine N/A [address] [facility] [facility] [address] [phone] --    [facility]  Declined  No Bed Available N/A [address] [facility] [address] [phone] --    [facility]  Declined  Admissions HOLD N/A [address] [facility] [address] [phone] --              Expected Discharge Date and Time     Expected Discharge Date Expected Discharge Time    Mar 9, 2022          Demographic Summary    No documentation.                Functional Status    No documentation.                Psychosocial    No documentation.                Abuse/Neglect    No documentation.                Legal    No documentation.                Substance Abuse    No documentation.                Patient Forms    No documentation.                   Gia Smalls, RN

## 2022-03-09 NOTE — PROGRESS NOTES
Discharge Planning Assessment  [facility]     Patient Name: Jose Angel Us  MRN: [id number]  Today's Date: 3/9/2022    Admit Date: 2/2/2022     Discharge Needs Assessment    No documentation.                Discharge Plan     Row Name 03/09/22 1113       Plan    Plan Comments Placed call to patient's sister/Daphne and left her a message regarding discharge planning. EDWIN Smalls RN, CCP.              Continued Care and Services - Admitted Since 2/2/2022     Destination Coordination complete.    Service Provider Request Status Selected Services Address Phone Fax Patient Preferred    [facility]   Selected Skilled Nursing [address] [facility] [address] [phone] --    [facility]  Accepted N/A [address] [facility] [address] [phone] --    [facility] RE[facility]IL[facility]  Pending - Request Sent N/A [address] [facility] [id number] [phone] --    [facility]  Pending - Request Sent N/A [address] [facility] [address] [phone] --    [facility]  Pending - Request Sent N/A [address] [facility] [address] [phone] --    [facility]  Pending - Request Sent N/A [address] [facility] [address] [phone] --    [facility]  Pending - Request Sent N/A [address] [facility] [address] [phone] --    [facility] REHABILITATION  Pending - Request Sent N/A [address] [facility] [address] [phone] --    [facility]  Pending - Request Sent N/A [address] [facility] [address] [phone] --    [facility]  Pending - Request Sent N/A [address] [facility] [address] [phone] --    [facility]  Pending - Request Sent N/A [address] [facility] [facility]  [address] [phone] --    [facility]  Pending - Request Sent N/A [address] [facility] [id number] [phone] --    [facility]  Pending - Request Sent N/A [address] [facility] [facility] [address] [phone] --    [facility]  Declined  No Bed Available N/A [address] [facility] [facility] [address] [phone] --    [facility]  Declined  unable to accept due to no covid-19 vaccine N/A [address] [facility] [address] [phone] --    [facility]  Declined  No Bed Available N/A [address] [facility] [address] [phone] --    [facility]  Declined  Admissions HOLD N/A [address] [facility] [address] [phone] --              Expected Discharge Date and Time     Expected Discharge Date Expected Discharge Time    Mar 9, 2022          Demographic Summary    No documentation.                Functional Status    No documentation.                Psychosocial    No documentation.                Abuse/Neglect    No documentation.                Legal    No documentation.                Substance Abuse    No documentation.                Patient Forms    No documentation.                   Gia Smalls, RN

## 2022-03-09 NOTE — PROGRESS NOTES
Name: Jose Angel Us ADMIT: 2022   : 1950  PCP: Lazaro TALIA Montanez    MRN: [id number] LOS: 35 days   AGE/SEX: 71 y.o. male  ROOM: [facility]     Subjective   Subjective   CC: generalized weakness  No acute events. Patient has no new complaints. Denies pain. No dyspnea/f/c/n/v/d.    Objective   Objective   Vital Signs  Temp:  [97.1 °F (36.2 °C)-97.5 °F (36.4 °C)] 97.1 °F (36.2 °C)  Heart Rate:  [85-94] 94  Resp:  [16-20] 16  BP: (120-130)/(64-69) 120/64  SpO2:  [95 %-96 %] 96 %  on   ;   Device (Oxygen Therapy): room air  Body mass index is 17.21 kg/m².  Physical Exam  Vitals and nursing note reviewed.   Constitutional:       General: He is not in acute distress.     Appearance: He is not toxic-appearing.   HENT:      Head: Normocephalic and atraumatic.      Nose: Nose normal.      Mouth/Throat:      Mouth: Mucous membranes are moist.      Pharynx: Oropharynx is clear.   Eyes:      Extraocular Movements: Extraocular movements intact.      Conjunctiva/sclera: Conjunctivae normal.      Pupils: Pupils are equal, round, and reactive to light.   Cardiovascular:      Rate and Rhythm: Normal rate and regular rhythm.      Pulses: Normal pulses.   Abdominal:      General: Bowel sounds are normal. There is no distension.      Palpations: Abdomen is soft.      Tenderness: There is no abdominal tenderness.   Musculoskeletal:         General: No swelling or tenderness.      Cervical back: Normal range of motion and neck supple.      Comments: Left foot dressed   Skin:     General: Skin is warm and dry.      Capillary Refill: Capillary refill takes less than 2 seconds.   Neurological:      General: No focal deficit present.      Mental Status: He is alert and oriented to person, place, and time.   Psychiatric:         Mood and Affect: Mood normal.         Behavior: Behavior normal.       Results Review     I reviewed the patient's new clinical results.  Results from last 7 days   Lab Units 22  0524 22  0555    WBC 10*3/mm3 27.24* 24.15*   HEMOGLOBIN g/dL 10.4* 10.1*   PLATELETS 10*3/mm3 540* 589*     Results from last 7 days   Lab Units 03/04/22  0524 03/03/22  0555   SODIUM mmol/L 135* 134*   POTASSIUM mmol/L 3.7 4.0   CHLORIDE mmol/L 102 101   CO2 mmol/L 25.0 28.5   BUN mg/dL 16 16   CREATININE mg/dL 0.81 0.78   GLUCOSE mg/dL 96 87     Results from last 7 days   Lab Units 03/04/22  0524 03/03/22  0555   ALBUMIN g/dL 2.20* 1.70*   BILIRUBIN mg/dL 0.6 0.6   ALK PHOS U/L 101 100   AST (SGOT) U/L 16 18   ALT (SGPT) U/L 11 11     Results from last 7 days   Lab Units 03/04/22  0524 03/03/22  0555   CALCIUM mg/dL 8.1* 8.2*   ALBUMIN g/dL 2.20* 1.70*   MAGNESIUM mg/dL 1.8 1.8   PHOSPHORUS mg/dL 3.2 3.1     Results from last 7 days   Lab Units 03/03/22 1953 03/03/22  0555   PROCALCITONIN ng/mL  --  0.23   LACTATE mmol/L 2.2*  --      COVID19   Date Value Ref Range Status   02/02/2022 Detected (C) Not Detected - Ref. Range Final     No results found for: HGBA1C, POCGLU    CT Abdomen Pelvis Without Contrast  Narrative: CT ABDOMEN PELVIS WO CONTRAST-     CLINICAL HISTORY: Follow-up hematoma     TECHNIQUE: Viral CT images were acquired through the abdomen and pelvis  with no oral or IV contrast and were reconstructed in 3 mm thick slices.     Radiation dose reduction techniques were utilized, including automated  exposure control and exposure modulation based on body size.     COMPARISON: CT scan of the abdomen and pelvis dated 02/13/2022.     FINDINGS: Again seen is a complex retroperitoneal hematoma within the  left flank region of the abdomen extending to the left hemipelvis that  shows overall decrease in size. The hematoma has intramuscular and  external muscular components. The extra muscular component measures 10.6  x 8.7 x 6.8 cm. Previously it measured 13.4 x 11.4 x 7.1 cm. The  intramuscular portion of the hematoma within the left iliac is muscle  and left psoas muscle has also diminished in overall size and density  in  the interval. No new hemorrhage is evident. No intraperitoneal blood is  identified. The liver, spleen, pancreas, and adrenal glands appear  within normal limits. The left kidney is anteriorly displaced by the  hematoma but is otherwise unremarkable. The degree of renal displacement  has diminished along with the size of the hematoma. The right kidney is  unremarkable. There is no hydronephrosis. An inferior vena cava filter  remains in satisfactory position without change. The stomach and small  bowel appear normal. There is no bowel distention. There are multiple  diverticuli in the sigmoid colon. There is no CT evidence of acute  diverticulitis. Images through the lung bases demonstrate small  bilateral pleural effusions that have diminished in size slightly. There  is peribronchial thickening and patchy reticulonodular opacities in the  inferior aspect of the right lung that are unchanged, and are quite  likely sequela of chronic bronchitis.     Impression: Large retroperitoneal hematoma within the left side of the  abdomen and left hemipelvis with intra and extra muscular components  showing overall decrease in size and density since the preceding CT  dated 02/13/2020.  No new hemorrhage is identified. No other acute process is identified in  the abdomen or pelvis. There is moderate sigmoid diverticulosis without  evidence of diverticulitis. Small bilateral pleural effusions have  diminished in size.     This report was finalized on 3/3/2022 5:48 PM by Dr. Jose Angel Whitaker M.D.       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   Necrotic left foot with sepsis  - due to arterial occlusion  - treated with cefepime and vancomycin but this has recurred  - patient refuses amputation    C Diff Diarrhea  - much improved  - continue on oral vancomycin and cholestyramine     COVID-19 pneumonia with acute hypoxic respiratory failure  - hypoxemia resolved  - treated with decadron     Left foot gangrene/Tibial Occlusion  - present on admission  - was placed on anticoagulation but he developed retroperitoneal hematoma so this had to be discontinued  - appreciate vascular surgery recs, they recommended amputation but he declined this     Acute DVT left popliteal peroneal soleal  - present on admission  - status post IVC filter 2/8     Retroperitoneal hematoma/hemorrhagic shock  - Initial event associated with anticoagulation which is now stopped  - Status post IVC filter.      Anemia  - due to acute blood loss superimposed on anemia of inflammation/chronic disease  - Status post transfusion 6 units packed RBCs and ferric gluconate infusion  - no evidence of ongoing bleeding     Thrombocytopenia  - consumptive due to hemorrhage, now resolved  - HIT antibody negative     Transaminitis: Resolved.     Urinary retention/clot  - Santana was replaced for retention. Urology saw during the hospitalization  - hematuria resolved     PAF  - now in NSR  - not an A/C candidate      CARI on CKD 3  - Secondary to  prerenal/ATN/sepsis/retention  - resolved, appreciate nephrology recs    Continue palliative measures.     DNR.  Discussed with patient, nursing staff and CCP.  Anticipate discharge long term care once arrangements have been made.      Indio Herrera MD  [facility]  03/09/22  16:12 EST

## 2022-03-09 NOTE — PLAN OF CARE
Goal Outcome Evaluation:  Plan of Care Reviewed With: patient        Progress: no change   Patient is alert and oriented times four. He can be forgetful at times. No complaints of pain or discomfort. Long term care placement pending. Will continue to monitor.

## 2022-03-09 NOTE — PROGRESS NOTES
Discharge Planning Assessment  [facility]     Patient Name: Jose Angel Us  MRN: [id number]  Today's Date: 3/9/2022    Admit Date: 2/2/2022     Discharge Needs Assessment    No documentation.                Discharge Plan     Row Name 03/09/22 1225       Plan    Plan Comments Resubmitted to facilities to see if they will have a long term medicaid pending bed available this week. EDWIN Smalls RN, CCP.    Row Name 03/09/22 1221       Plan    Plan Comments Called Jenny with [facility] and she has no medicaid beds pending available. Called Terrance with [facility] and she will evaluate for her facilities and will call CCP back regarding if they will have any medicaid beds available. Spoke with the patient's sister by phone and confirmed he will need a medicaid bed pending at discharge and for [facility] to follow at the facility. EDWIN Smalls RN, CCP.    Row Name 03/09/22 1114       Plan    Plan Comments Placed call to patient's sister/Daphne and left her a message regarding discharge planning. EDWIN Smalls RN, CCP.              Continued Care and Services - Admitted Since 2/2/2022     Destination Coordination complete.    Service Provider Request Status Selected Services Address Phone Fax Patient Preferred    [facility]   Selected Skilled Nursing [address] [facility] [address] [phone] --    [facility]  Pending - Request Sent N/A [address] [facility] [address] [phone] --    [facility]  Pending - Request Sent N/A [address] [facility] [address] [phone] --    [facility]  Pending - Request Sent N/A [address] [facility] [address] [phone] --    [facility]  Pending - Request Sent N/A [address] [facility] [id number] [phone] --    [facility]  Pending - Request Sent N/A [address] ,  [facility] [address] [phone] --    [facility]  Pending - Request Sent N/A [address] [facility] [address] [phone] --    [facility]  Pending - [facility] Sent N/A [address] [address] [phone] --    [facility]  Pending - Request Sent N/A [address] [facility] [address] [phone] --    [facility]  Pending - Request Sent N/A [address] [address] [phone] --    [facility]  Pending - Request Sent N/A [address] [facility] [id number] [phone] --    [facility]  Pending - Request Sent N/A [address] [facility] [address] [facility] [id number] [phone] --    [facility]  Declined  No Medicaid Bed Available N/A [address] [address] [phone] --    [facility]  Declined  No [facility] Available N/A [address] [facility] [id number] [phone] --    [facility]  Declined  unable to accept due to no covid-19 vaccine N/A [address] [facility] [address] [phone] --    [facility]  Declined  No Bed Available N/A [address] [facility] [facility] [id number] [phone] --    [facility]  Declined  Admissions HOLD N/A [address] [facility] [phone] [phone] --              Expected Discharge Date and Time     Expected Discharge Date Expected Discharge Time    Mar 9, 2022          Demographic Summary    No documentation.                Functional Status    No documentation.                Psychosocial    No documentation.                Abuse/Neglect    No documentation.                Legal    No documentation.                Substance Abuse    No  documentation.                Patient Forms    No documentation.                   Gia Smalls RN

## 2022-03-09 NOTE — PROGRESS NOTES
Palliative Care [facility] followed up with patient for psychosocial support. Patient resting calm and comfortably in bed. Patient had complaints of drowsiness today, reports he has been sleeping well at night, denied pain or anxiety. Patient expressed that it is still his goal to go to facility with hospice care involvement. Assessed his support system. He reported that his sister is involved and he she will call him to talk. He denied any spiritual needs at this time. Continued to provide support and advised him of availability if needed.       Social Work Assessment Tool (SWAT) for Palliative Care Patients and Caregivers          How well are patient and primary            caregiver doing?       1                2            3                4                   5  Not well   Not too   Neutral   Reasonably  Extremely     at all        well                          well               well                                     ISSUE:               Patient          Primary Caregiver    1.End of life decisions consistent with their religous and cultural norms.    5 Extremely well    YOLA   2. Patient thoughts of suicide or wanting to hasten death.    5 Extremely well YOLA   3. Anxiety about death    4 Reasonably well YOLA   4. Preferences about environment (e.g., pets, own bed etc.)   4 Reasonably well YOLA   5. Social support    3 Neutral YOLA   6. Financial resources   3 Neutral YOLA   7. Safety issues   5 Extremely well YOLA     8. Comfort issues   5 Extremely well YOLA   9. Complicated anticipatory grief (e.g., guilt, depression, etc.)   5 Extremely well YOLA   10. Awareness of prognosis    4 Reasonably well YOLA   11. Spirituality  (e.g., higher purpose in life, sense of connection with all)    4 Reasonably well YOLA    Total Score Patient Score   46 Caregiver Score

## 2022-03-10 PROBLEM — Z51.5 COMFORT MEASURES ONLY STATUS: Status: ACTIVE | Noted: 2022-03-10

## 2022-03-10 RX ADMIN — VANCOMYCIN HYDROCHLORIDE 125 MG: 125 CAPSULE ORAL at 21:43

## 2022-03-10 RX ADMIN — COLLAGENASE SANTYL 1 APPLICATION: 250 OINTMENT TOPICAL at 09:53

## 2022-03-10 RX ADMIN — VANCOMYCIN HYDROCHLORIDE 125 MG: 125 CAPSULE ORAL at 09:52

## 2022-03-10 RX ADMIN — FAMOTIDINE 20 MG: 20 TABLET ORAL at 06:26

## 2022-03-10 RX ADMIN — FAMOTIDINE 20 MG: 20 TABLET ORAL at 18:12

## 2022-03-10 RX ADMIN — DILTIAZEM HYDROCHLORIDE 30 MG: 30 TABLET, FILM COATED ORAL at 21:43

## 2022-03-10 RX ADMIN — DILTIAZEM HYDROCHLORIDE 30 MG: 30 TABLET, FILM COATED ORAL at 06:26

## 2022-03-10 RX ADMIN — VANCOMYCIN HYDROCHLORIDE 125 MG: 125 CAPSULE ORAL at 02:39

## 2022-03-10 RX ADMIN — CHOLESTYRAMINE 1 PACKET: 4 POWDER, FOR SUSPENSION ORAL at 09:52

## 2022-03-10 RX ADMIN — DILTIAZEM HYDROCHLORIDE 30 MG: 30 TABLET, FILM COATED ORAL at 15:47

## 2022-03-10 RX ADMIN — VANCOMYCIN HYDROCHLORIDE 125 MG: 125 CAPSULE ORAL at 15:47

## 2022-03-10 NOTE — PLAN OF CARE
Goal Outcome Evaluation:  Plan of Care Reviewed With: patient           Outcome Evaluation: Denies pain, L foot dressing dry/intact.  Not eating much.  Flat affect, continue to monitor.

## 2022-03-10 NOTE — PROGRESS NOTES
[facility] Medicine Services  PROGRESS NOTE    Patient Name: Jose Angel Us  : 1950  MRN: [id number]    Date of Admission: 2022  Primary Care Physician: Tarik Willis APRN    Subjective   Subjective     CC:  Follow-up General weakness    HPI:  Patient denies any leg pain.  He is eager to go to a facility for palliative care.  His stools are improving.  He says he does not want to escalate the medical care he is receiving right now and is pleased with his care.  No other new complaints.    Review of Systems  No current fevers or chills  No current shortness of breath or cough  No current chest pain or palpitations       Objective   Objective     Vital Signs:   Temp:  [97.1 °F (36.2 °C)-97.5 °F (36.4 °C)] 97.3 °F (36.3 °C)  Heart Rate:  [94-98] 98  Resp:  [14-16] 16  BP: (120-139)/(64-76) 139/76        Physical Exam:  Constitutional:Awake, alert  HENT: NCAT, mucous membranes moist, neck supple  Respiratory: Clear to auscultation bilaterally, respiratory effort normal, nonlabored breathing   Cardiovascular: RRR, normal radial pulses  Gastrointestinal: Positive bowel sounds, soft, nontender, nondistended  Musculoskeletal: Thin frail chronically debilitated appearance, left foot with dressing intact and no active drainage,muscle wasting and BMI only 17, no lower extremity edema  Psychiatric: Appropriate affect, cooperative, conversational  Neurologic: No slurred speech or facial droop, follows commands  Skin: No rashes or jaundice, warm      Results Reviewed:  Results from last 7 days   Lab Units 22  0524   WBC 10*3/mm3 27.24*   HEMOGLOBIN g/dL 10.4*   HEMATOCRIT % 32.9*   PLATELETS 10*3/mm3 540*     Results from last 7 days   Lab Units 22  0524   SODIUM mmol/L 135*   POTASSIUM mmol/L 3.7   CHLORIDE mmol/L 102   CO2 mmol/L 25.0   BUN mg/dL 16   CREATININE mg/dL 0.81   GLUCOSE mg/dL 96   CALCIUM mg/dL 8.1*   ALT (SGPT) U/L 11   AST (SGOT) U/L 16     Estimated Creatinine Clearance: 58.5 mL/min (by  C-G formula based on SCr of 0.81 mg/dL).    Microbiology Results Abnormal     Procedure Component Value - Date/Time    Blood Culture - Blood, Hand, Left [[id number]]  (Normal) Collected: 03/04/22 1114    Lab Status: Final result Specimen: Blood from Hand, Left Updated: 03/09/22 1132     Blood Culture No growth at 5 days    Blood Culture - Blood, Arm, Right [[id number]]  (Normal) Collected: 03/04/22 1106    Lab Status: Final result Specimen: Blood from Arm, Right Updated: 03/09/22 1132     Blood Culture No growth at 5 days    Blood Culture - Blood, Arm, Right [[id number]]  (Normal) Collected: 02/13/22 1235    Lab Status: Final result Specimen: Blood from Arm, Right Updated: 02/18/22 1315     Blood Culture No growth at 5 days    Blood Culture - Blood, Arm, Right [[id number]]  (Normal) Collected: 02/13/22 0655    Lab Status: Final result Specimen: Blood from Arm, Right Updated: 02/18/22 0730     Blood Culture No growth at 5 days    Blood Culture - Blood, Arm, Left [[id number]]  (Normal) Collected: 02/02/22 1316    Lab Status: Final result Specimen: Blood from Arm, Left Updated: 02/07/22 1330     Blood Culture No growth at 5 days    Blood Culture - Blood, Arm, Left [[id number]]  (Normal) Collected: 02/02/22 1316    Lab Status: Final result Specimen: Blood from Arm, Left Updated: 02/07/22 1330     Blood Culture No growth at 5 days    MRSA Screen, PCR (Inpatient) - Swab, Nares [[id number]]  (Normal) Collected: 02/07/22 1132    Lab Status: Final result Specimen: Swab from Nares Updated: 02/07/22 1317     MRSA PCR No MRSA Detected          Imaging Results (Last 24 Hours)     ** No results found for the last 24 hours. **          Results for orders placed during the hospital encounter of 02/02/22    Adult Transthoracic Echo Complete W/ Cont if Necessary Per Protocol    Interpretation Summary  · Left ventricular ejection fraction appears to be 56 - 60%.  · Left ventricular diastolic function was normal.  · There is moderate,  bileaflet mitral valve thickening present.  · Mild mitral valve regurgitation is present.  · Mild tricuspid valve regurgitation is present.  · Mild dilation of the ascending aorta is present.      I have reviewed the medications:  Scheduled Meds:cholestyramine, 1 packet, Oral, BID  collagenase, 1 application, Topical, Q24H  dilTIAZem, 30 mg, Oral, Q8H  famotidine, 20 mg, Oral, BID AC  vancomycin, 125 mg, Oral, Q6H      Continuous Infusions:Pharmacy Consult,       PRN Meds:.•  acetaminophen **OR** acetaminophen **OR** acetaminophen  •  albuterol sulfate HFA  •  benzonatate  •  calcium carbonate  •  melatonin  •  muscle rub  •  nitroglycerin  •  ondansetron **OR** ondansetron  •  Pharmacy Consult  •  potassium & sodium phosphates **OR** potassium & sodium phosphates  •  [COMPLETED] Insert peripheral IV **AND** sodium chloride  •  temazepam    Assessment/Plan   Assessment & Plan     Active Hospital Problems    Diagnosis  POA   • Nontraumatic retroperitoneal hematoma [K66.1]  No   • Severe malnutrition (CMS/HCC) [E43]  Yes   • Acute hypoxemic respiratory failure (HCC) [J96.01]  Yes   • Acute deep vein thrombosis (DVT) of left lower extremity  [I82.402]  Yes   • 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        Brief Hospital Course to date:  Jose Angel Us is a 71 y.o. male presents to the hospital with necrotic left ischemic foot due to arterial occlusion and has refused recommended amputation.   additionally he has C. difficile colitis and COVID-19 infection.  He has opted to transition to comfort focused care and is wanting  placement.    Discussion/plan for today:  Continue overall treatment plan and supportive care and symptom treatment as outlined per below.  Patient appears comfortable and reasonably stable to transfer to nursing home for the continued palliative care he is requesting.  I have spoken with nursing who will notify me if patient has any acute issues today or needs any further symptomatic medication adjustments.    Necrotic left foot with sepsis  - due to arterial occlusion  - treated with cefepime and vancomycin but this has recurred  - patient refuses amputation     C Diff Diarrhea  - much improved  - continue on oral vancomycin and cholestyramine     COVID-19 pneumonia with acute hypoxic respiratory failure  - hypoxemia resolved  - treated with decadron     Left foot gangrene/Tibial Occlusion  - present on admission  - was placed on anticoagulation but he developed retroperitoneal hematoma so this had to be discontinued  - appreciate vascular surgery recs, they recommended amputation but he declined this     Acute DVT left popliteal peroneal soleal  - present on admission  - status post IVC filter 2/8     Retroperitoneal hematoma/hemorrhagic shock  - Initial event associated with anticoagulation which is now stopped  - Status post IVC filter.      Anemia  - due to acute blood loss superimposed on anemia of inflammation/chronic disease  - Status post transfusion 6 units packed RBCs and ferric gluconate infusion  - no evidence of ongoing bleeding     Thrombocytopenia  - consumptive due to hemorrhage, now resolved  - HIT antibody negative     Transaminitis: Resolved.     Urinary retention/clot  - Santana was replaced for retention. Urology saw during the hospitalization  - hematuria resolved     PAF  - now in NSR  - not an A/C candidate      CARI on CKD 3  - Secondary to prerenal/ATN/sepsis/retention  - resolved, appreciate nephrology recs     Continue palliative measures.      DNR.  Discussed with patient, nursing staff  and CCP.  Anticipate discharge long term care once arrangements have been made.    CODE STATUS:   Code Status and Medical Interventions:   Ordered at: 03/04/22 5105     Level Of Support Discussed With:    Patient     Code Status (Patient has no pulse and is not breathing):    No CPR (Do Not Attempt to Resuscitate)     Medical Interventions (Patient has pulse or is breathing):    Comfort Measures       Jacob Baires MD  03/10/22

## 2022-03-10 NOTE — CONSULTS
Met with patient and provided explanation of services. Written material provided. He requests sister be called for explanation as well. Call made to sister, no answer, left voice mail. Plan is LTC with [facility]. [facility] to follow up for plans and at LTC. If plans change or any questions please call.    Thank you for the referral.    Kalyn Loco RN  [facility]  [phone]

## 2022-03-10 NOTE — PROGRESS NOTES
Discharge Planning Assessment  [facility]     Patient Name: Jose Angel Us  MRN: [id number]  Today's Date: 3/10/2022    Admit Date: 2/2/2022     Discharge Needs Assessment    No documentation.                Discharge Plan     Row Name 03/10/22 1652       Plan    Plan Comments According to Sawyer the patient hs [place] medicaid and #[id number]. Sent to Insurance verification to please verify. Called Padilla Mckee/Suzanne and updated her also. EDWIN Smalls RN, CCP.    Row Name 03/10/22 0812       Plan    Plan Comments Suzanne with [facility] Mckee will evaluate and give CCP a call in the AM on bed availability. EDWIN Smalls RN, CCP    Row Name 03/10/22 1445       Plan    Plan Comments Per Soniya/Collin no medicaid beds in [place] available. EDWIN Smalls RN, CCP.              Continued Care and Services - Admitted Since 2/2/2022     Destination     Service Provider Request Status Selected Services Address Phone Fax Patient Preferred    [facility]  Pending - Request Sent N/A [address] [facility] [address] [phone] --    [facility]  Pending - Request Sent N/A 6415 [facility] [address] [phone] --    [facility]  Pending - Request Sent N/A 3526 [facility] [address] [phone] --    [facility]  Pending - Request Sent N/A 227 [facility] [address] [phone] --    [facility]  Pending - Request Sent N/A [address] [facility] [address] [phone] --    [facility]  Pending - Request Sent N/A 211 [facility] [address] [phone] --    [facility]  Pending - Request Sent N/A 1705 [facility] [facility] [address] [phone] --    SHERI AKHTAR  Pending - Request Sent N/A [address] SHER[address]OMEZ, [facility] [address] [phone] --     SREE ANDRADE  Pending - Request Sent N/A [address] [facility] [address] [phone] --    [facility]  Pending - Request Sent N/A 900 [facility] [address] [phone] --    [facility]  Pending - Request Sent N/A 1015 [facility] [address] [phone] --    [facility]  Pending - Request Sent N/A 1550 TIFF[address] [facility] [address] [phone] --    [facility] HEALTH AND REHABILITATION  Pending - Request Sent N/A 4[address] [facility] [address] [phone] --    [facility]  Pending - Request Sent N/A 225 [facility] [address] [phone] --    [facility] OF [facility]  Pending - Request Sent N/A 2529 [facility] [address] [phone] --    [facility]  Pending - Request Sent N/A 310 [facility] [address] [phone] --    [facility]  Pending - No Request Sent N/A 543 [facility] [address] [phone] -- --    [facility]  Declined  No Medicaid Bed Available N/A 1801 [facility] [address] [phone] --    [facility] REHABILITATION  Declined  No Bed Available N/A 2141 [facility] [address] [phone] --    [facility]  Declined  No Medicaid Bed Available N/A 3[address] DAY[address] [facility] [address] [phone] --    [facility]  Declined  No Bed Available N/A [address] COLLINS, [facility] [address] [phone] --    [facility]  Declined  unable to accept due to no covid-19 vaccine N/A [address], [facility] [address] [phone] --    [facility]  Declined  No Bed Available N/A  [address] [facility] [address] [phone] --    [facility]  Declined  No Bed Available N/A 3701 [facility] [address] [phone] --    [facility]IV[facility]  Declined  Admissions HOLD N/A 2116 [facility] [address] [phone] --    [facility]  Declined  No Medicaid Bed Available N/A 711 [facility] [address] [phone] --    [facility]  Declined  No Medicaid Bed Available N/A 300 MEL[address] REY[facility]LD[address] [facility]LESDe[facility] [address] [phone] --    [facility]  Declined  No Bed Available N/A 5[address] M[address] [facility] [address] [phone] --    [facility]  Declined  Bed not available N/A [address] [facility] [address] [phone] --              Expected Discharge Date and Time     Expected Discharge Date Expected Discharge Time    Mar 15, 2022          Demographic Summary    No documentation.                Functional Status    No documentation.                Psychosocial    No documentation.                Abuse/Neglect    No documentation.                Legal    No documentation.                Substance Abuse    No documentation.                Patient Forms    No documentation.                   Gia Smalls, RN

## 2022-03-10 NOTE — PROGRESS NOTES
Discharge Planning Assessment  [facility]     Patient Name: Jose Angel Us  MRN: [id number]  Today's Date: 3/10/2022    Admit Date: 2/2/2022     Discharge Needs Assessment    No documentation.                Discharge Plan     Row Name 03/10/22 1637       Plan    Plan Comments Suzanne with [facility] Mckee will evaluate and give CCP a call in the AM on bed availability. EDWIN Smalls RN, CCP    Row Name 03/10/22 1443       Plan    Plan Comments Per Soniya/Collin no medicaid beds in [place] available. EDWIN Smalls RN, CCP.              Continued Care and Services - Admitted Since 2/2/2022     Destination     Service Provider Request Status Selected Services Address Phone Fax Patient Preferred    [facility]  Pending - Request Sent N/A 1705 [facility] [address] [phone] --    [facility]  Pending - Request Sent N/A 6415 [facility] [address] [phone] --    [facility]  Pending - Request Sent N/A 3526 [facility] [address] [phone] --    [facility]  Pending - Request Sent N/A 227 [facility] [address] [phone] --    [facility]  Pending - Request Sent N/A 1155 [facility] [address] [phone] --    [facility] [facility]  Pending - Request Sent N/A 211 [facility] [address] [phone] --    [facility] AND REHAB  Pending - Request Sent N/A 1705 [facility] [address] [phone] --    SHERI AKHTAR  Pending - Request Sent N/A 3802 SHERI [facility] [address] [phone] --    SREE ANDRADE  Pending - Request Sent N/A [address] PRADEEP [facility] [address] [phone] --    [facility]  Pending - Request Sent N/A 900 MARYJOJENNIFER [facility] [address] [phone] --     [facility]  Pending - Request Sent N/A 1015 MAGAZ[address] [facility] [address] [phone] --    [facility]  Pending - Request Sent N/A 1550 TIFF[address] [facility]LESLivingston [facility] [address] [phone] --    [facility] HEALTH AND REHABILITATION  Pending - Request Sent N/A 4604 ROSAL[address] [facility] [address] [phone] --    [facility] AND REHABILITATION [place]  Pending - Request Sent N/A 225 [facility] [address] [phone] --    [facility]  Pending - Request Sent N/A 2529 [facility] [address] [phone] --    [place] [facility]  Pending - Request Sent N/A 310 [facility] [address] [phone] --    [facility]  Pending - No Request Sent N/A 543 [facility] [place] [address] [phone] -- --    [facility]  Declined  No Medicaid Bed Available N/A 1801 JS [facility]ANLivingston Hospital [facility] [address] [phone] --    [facility] REHABILITATION  Declined  No Bed Available N/A 2141 [facility] [address] [phone] --    [facility] RE[facility]AB  Declined  No Medicaid Bed Available N/A 3116 DAYO [facility] [address] [phone] --    [facility]  Declined  No Bed Available N/A 1120 PURNIM[address] [facility] [address] [phone] --    [facility]  Declined  unable to accept due to no covid-19 vaccine N/A 4200 GORDO [facility] [address] [phone] --    [facility]HAB[facility]ITATION  Declined  No Bed Available N/A 2100 KETTY [facility] [address] [phone] --    [facility]  Declined  No Bed Available N/A 3701 B[facility] OSORIOLivingston [facility] [address] [phone] --    [facility]  [place] [facility]  Declined  Admissions HOLD N/A 2116 [facility] [place] [address] [phone] --    [facility]  Declined  No Medicaid Bed Available N/A [address] RD[facility] [address] [phone] --    [facility]  Declined  No Medicaid Bed Available N/A 300 MELVA [facility]AC[facility]BS [facility] [address] [phone] --    [facility]  Declined  No Bed Available N/A 5012 MERYL [facility] [address] [phone] --    [facility]  Declined  Bed not available N/A [address] [place] [facility] [address] [phone] --              Expected Discharge Date and Time     Expected Discharge Date Expected Discharge Time    Mar 15, 2022          Demographic Summary    No documentation.                Functional Status    No documentation.                Psychosocial    No documentation.                Abuse/Neglect    No documentation.                Legal    No documentation.                Substance Abuse    No documentation.                Patient Forms    No documentation.                   Gia Smalls, RN

## 2022-03-10 NOTE — PLAN OF CARE
Problem: Adult Inpatient Plan of Care  Goal: Patient-Specific Goal (Individualized)  Recent Flowsheet Documentation  Taken 3/10/2022 0346 by Kari Pickett RN  Patient-Specific Goals (Include Timeframe): NA  Individualized Care Needs: Patient likes cranberry juice  Anxieties, Fears or Concerns: NA   Goal Outcome Evaluation:           Progress: no change  Outcome Evaluation: Patient alert and oriented. Incontinent. No c/o pain. Pt resting well this night shift. VSS. Will continue to monitor for discomfort.

## 2022-03-10 NOTE — PROGRESS NOTES
Continued Stay Note  [facility]     Patient Name: Jose Angel Us  MRN: [id number]  Today's Date: 3/10/2022    Admit Date: 2/2/2022     Discharge Plan     Row Name 03/10/22 1444       Plan    Plan Comments Per Soniya/Collin no medicaid beds in [place] available. EDWIN Smalls RN, CCP.    Row Name 03/10/22 1255       Plan    Plan Comments Called Padmini/Beena Velazquez to see if any of their facilities can accept and left her a message. EDWIN Smalls RN, CCP.               Discharge Codes    No documentation.               Expected Discharge Date and Time     Expected Discharge Date Expected Discharge Time    Mar 10, 2022             Gia Smalls RN

## 2022-03-10 NOTE — PLAN OF CARE
Goal Outcome Evaluation:           Progress: no change  Outcome Evaluation: Pt rested today. Wound care per order. Refused turns. Denies pain. Bladder scanned performed; showed >999, F/C placed for comfort. Waiting for placement, hospice following, will cont to monitor

## 2022-03-10 NOTE — PROGRESS NOTES
Discharge Planning Assessment  [facility]     Patient Name: Jose Angel Us  MRN: [id number]  Today's Date: 3/10/2022    Admit Date: 2/2/2022     Discharge Needs Assessment    No documentation.                Discharge Plan     Row Name 03/10/22 1021       Plan    Plan Comments [facility] with Terrance at [facility] and they can not take him at any of their facilities. Called Soniya/Collin to evaluate for any of her facilities for a medicaid pending bed, palliative and with [facility] to follow. EDWIN Smalls RN, CCP.              Continued Care and Services - Admitted Since 2/2/2022     Destination Coordination complete.    Service Provider Request Status Selected Services Address Phone Fax Patient Preferred    [facility] REHAB   Selected Skilled Nursing [address] [facility] [address] [phone] --    [facility]  Pending - Request Sent N/A 1705 [facility] [address] [phone] --    [facility]  Pending - Request Sent N/A 6415 [facility] [address] [phone] --    [facility]  Pending - Request Sent N/A 3526 [facility] [address] [phone] --    [facility]  Pending - Request Sent N/A 227 [facility] [address] [phone] --    [facility]  Pending - Request Sent N/A 1155 [facility] [address] [phone] --    [place]  Pending - Request Sent N/A 211 [facility] [address] [phone] --    [facility] REHAB  Pending - Request Sent N/A 1705 FAUZIA OSORI[facility] [address] [phone] --    SHERI AKHTAR  Pending - Request Sent N/A 3802 [facility] [facility]Jackson [facility] [address] [phone] --    SREE ANDRADE  Pending - Request Sent N/A 446 Y[address]ESH OS[facility] [address]  [phone] --    [facility] HEALTHCARE AT [facility]  Declined  No Medicaid Bed Available N/A 1801 JS G[facility]VANJackson [facility] Medical [facility] [address] [phone] --    [facility]  Declined  No Bed Available N/A 2141 [facility] [address] [phone] --    [facility] OF [facility]  Declined  No Bed Available N/A 1120 [facility] [address] [phone] --    [facility]  Declined  unable to accept due to no covid-19 vaccine N/A 4200 GORD[address] [facility] [address] [phone] --    [facility]  Declined  No Bed Available N/A 2100 M[address] [facility] [address] [phone] --    [facility]  Declined  No Bed Available N/A 3701 [facility]NE [facility] [address] [phone] --    [facility]  Declined  Admissions HOLD N/A 2116 [facility] [address] [phone] --              Expected Discharge Date and Time     Expected Discharge Date Expected Discharge Time    Mar 10, 2022          Demographic Summary    No documentation.                Functional Status    No documentation.                Psychosocial    No documentation.                Abuse/Neglect    No documentation.                Legal    No documentation.                Substance Abuse    No documentation.                Patient Forms    No documentation.                   Gia Smalls, MARIE

## 2022-03-10 NOTE — PROGRESS NOTES
Discharge Planning Assessment  [facility]     Patient Name: Jose Angel Us  MRN: [id number]  Today's Date: 3/10/2022    Admit Date: 2/2/2022     Discharge Needs Assessment    No documentation.                Discharge Plan     Row Name 03/10/22 1258       Plan    Plan Comments Sent out more referrals to [facility], [facility], [facility], and [facility] to see if they have a medicaid bed. Called [facility]omi [facility] and left her a message to see if any of her faciities can accept. Called Padmini/Beena Velazquez to see if any of their facilities can accept and left her a message. EDWIN Smalls RN, CCP.    Row Name 03/10/22 1027       Plan    Plan Comments Spoke with Terrance at [facility] and they can not take him at any of their facilities. Called Soniya/Collin to evaluate for any of her facilities for a medicaid pending bed, palliative and with [facility] to follow. EDWIN Smalls RN, CCP.              Continued Care and Services - Admitted Since 2/2/2022     Destination     Service Provider Request Status Selected Services Address Phone Fax Patient Preferred    [facility]  Pending - Request Sent N/A [address] [facility] [address] [phone] --    [facility]  Pending - Request Sent N/A 6415 [facility] [address] [phone] --    [facility]  Pending - Request Sent N/A 3526 [facility] [address] [phone] --    [facility]  Pending - Request Sent N/A 227 [facility] [address] [phone] --    [facility]  Pending - Request Sent N/A [address] [facility] [address] [phone] --    [place]  Pending - Request Sent N/A 211 [facility] [address] [phone] --    [facility] AND REHAB  Pending - Request Sent N/A [address],  [facility] [address] [phone] --    [facility]  Pending - Request Sent N/A [address], [facility] [address] [phone] --    SREE ANDRADE  Pending - Request Sent N/A [address] PR[address] [facility] [address] [phone] --    [facility]  Pending - Request Sent N/A 900 SIN[address] [facility] [address] [phone] --    [facility]  Pending - Request Sent N/A 1015 [facility] [address] [phone] --    [facility]  Pending - Request Sent N/A 1550 TIFFANY [facility]ESS[facility] [address] [phone] --    [facility] HEALTH AND REHABILITATION  Pending - Request Sent N/A 4604 ROSALBA [facility] [address] [phone] --    [facility] AND REHABILITATION [place]  Pending - Request Sent N/A 225 [facility] [address] [phone] --    [facility]ALTHCARE AT [facility]  Declined  No Medicaid Bed Available N/A 1801 JS GALVANSaint [facility] Hospital [address] [phone] --    [facility] REHABILITATION  Declined  No Bed Available N/A 2141 [facility] [address] [phone] --    [facility] REHAB  Declined  No Medicaid Bed Available N/A 3[address] DAY[address] L[facility] [address] [phone] --    [facility]  Declined  No Bed Available N/A 1120 PURNIM[address] [facility] [address] [phone] --    [facility]  Declined  unable to accept due to no covid-19 vaccine N/A [address] , [facility] [address] [phone] --    [facility]  Declined  No Bed Available N/A 2100 KETTY [facility] [address] [phone] --    [facility]  Declined  No Bed Available N/A 3701 ABRT OSORIOSaint [facility] [address] [phone] --     [facility] LIVING  Declined  Admissions HOLD N/A 2116 [facility] [address] [phone] --              Expected Discharge Date and Time     Expected Discharge Date Expected Discharge Time    Mar 10, 2022          Demographic Summary    No documentation.                Functional Status    No documentation.                Psychosocial    No documentation.                Abuse/Neglect    No documentation.                Legal    No documentation.                Substance Abuse    No documentation.                Patient Forms    No documentation.                   Gia Smalls RN

## 2022-03-11 VITALS
DIASTOLIC BLOOD PRESSURE: 73 MMHG | HEART RATE: 103 BPM | TEMPERATURE: 97.4 F | SYSTOLIC BLOOD PRESSURE: 120 MMHG | WEIGHT: 112.21 LBS | BODY MASS INDEX: 17.01 KG/M2 | OXYGEN SATURATION: 97 % | RESPIRATION RATE: 16 BRPM | HEIGHT: 68 IN

## 2022-03-11 PROBLEM — D89.831 CYTOKINE RELEASE SYNDROME, GRADE 1: Status: ACTIVE | Noted: 2022-03-11

## 2022-03-11 PROBLEM — J96.01 ACUTE HYPOXEMIC RESPIRATORY FAILURE (HCC): Status: RESOLVED | Noted: 2022-02-02 | Resolved: 2022-03-11

## 2022-03-11 LAB — SARS-COV-2 RNA PNL SPEC NAA+PROBE: NOT DETECTED

## 2022-03-11 PROCEDURE — 87635 SARS-COV-2 COVID-19 AMP PRB: CPT | Performed by: INTERNAL MEDICINE

## 2022-03-11 RX ORDER — ALBUTEROL SULFATE 90 UG/1
2 AEROSOL, METERED RESPIRATORY (INHALATION) EVERY 6 HOURS PRN
Start: 2022-03-11

## 2022-03-11 RX ORDER — CHOLESTYRAMINE 4 G/9G
1 POWDER, FOR SUSPENSION ORAL 2 TIMES DAILY
Start: 2022-03-11

## 2022-03-11 RX ORDER — ONDANSETRON 4 MG/1
4 TABLET, FILM COATED ORAL EVERY 6 HOURS PRN
Start: 2022-03-11

## 2022-03-11 RX ORDER — UREA 10 %
1 LOTION (ML) TOPICAL NIGHTLY PRN
Qty: 30 TABLET
Start: 2022-03-11

## 2022-03-11 RX ORDER — ACETAMINOPHEN 325 MG/1
650 TABLET ORAL EVERY 6 HOURS PRN
Start: 2022-03-11

## 2022-03-11 RX ORDER — CALCIUM CARBONATE 200(500)MG
2 TABLET,CHEWABLE ORAL 2 TIMES DAILY PRN
Start: 2022-03-11

## 2022-03-11 RX ORDER — MUSCLE RUB CREAM 100; 150 MG/G; MG/G
CREAM TOPICAL
Start: 2022-03-11

## 2022-03-11 RX ORDER — VANCOMYCIN HYDROCHLORIDE 125 MG/1
125 CAPSULE ORAL EVERY 6 HOURS SCHEDULED
Qty: 22 CAPSULE | Refills: 0
Start: 2022-03-11 | End: 2022-03-17

## 2022-03-11 RX ORDER — BENZONATATE 100 MG/1
100 CAPSULE ORAL 3 TIMES DAILY PRN
Start: 2022-03-11

## 2022-03-11 RX ORDER — FAMOTIDINE 20 MG/1
20 TABLET, FILM COATED ORAL
Start: 2022-03-11

## 2022-03-11 RX ADMIN — VANCOMYCIN HYDROCHLORIDE 125 MG: 125 CAPSULE ORAL at 02:05

## 2022-03-11 RX ADMIN — COLLAGENASE SANTYL 1 APPLICATION: 250 OINTMENT TOPICAL at 10:25

## 2022-03-11 RX ADMIN — DILTIAZEM HYDROCHLORIDE 30 MG: 30 TABLET, FILM COATED ORAL at 06:16

## 2022-03-11 RX ADMIN — DILTIAZEM HYDROCHLORIDE 30 MG: 30 TABLET, FILM COATED ORAL at 15:33

## 2022-03-11 RX ADMIN — FAMOTIDINE 20 MG: 20 TABLET ORAL at 18:57

## 2022-03-11 RX ADMIN — FAMOTIDINE 20 MG: 20 TABLET ORAL at 06:16

## 2022-03-11 RX ADMIN — VANCOMYCIN HYDROCHLORIDE 125 MG: 125 CAPSULE ORAL at 10:25

## 2022-03-11 RX ADMIN — CHOLESTYRAMINE 1 PACKET: 4 POWDER, FOR SUSPENSION ORAL at 10:25

## 2022-03-11 RX ADMIN — VANCOMYCIN HYDROCHLORIDE 125 MG: 125 CAPSULE ORAL at 15:33

## 2022-03-11 NOTE — PROGRESS NOTES
Case Management Discharge Note      Final Note: Discharged to [facility] medicaid bed, palliative bed with [facility] to admit and follow once patient arrives at nursing home and a [facility] order is placed on the patient's chart at [facility]. Update [facility]/Kalyn/RN of discharge and to follow. EDWIN Smalls RN, CCP.         Selected Continued Care - Admitted Since 2/2/2022     Destination Coordination complete.    Service Provider Selected Services Address Phone Fax Patient Preferred    [facility] [address] [facility] [address] [phone] --          Durable Medical Equipment    No services have been selected for the patient.              Dialysis/Infusion    No services have been selected for the patient.              Home Medical Care    No services have been selected for the patient.              Therapy    No services have been selected for the patient.              Community Resources    No services have been selected for the patient.              Community & DME    No services have been selected for the patient.                  Transportation Services  Ambulance: [facility]    Final Discharge Disposition Code: 04 - [facility]

## 2022-03-11 NOTE — PROGRESS NOTES
Discharge Planning Assessment  [facility]     Patient Name: Jose Angel Us  MRN: [id number]  Today's Date: 3/11/2022    Admit Date: 2/2/2022     Discharge Needs Assessment    No documentation.                Discharge Plan     Row Name 03/11/22 1006       Plan    Plan Comments Placed call to the patient's sister to review discharge plans per patient's request. Left a secure message. EDWIN Smalls RN, CCP.    Row Name 03/11/22 0980       Plan    Plan Comments [facility] can accept to a palliative bed, medicaid bed with [facility] to follow. Spoke with the patient and he is agreeable to the discharge plans. EDWIN Smalls RN, CCP.              Continued Care and Services - Admitted Since 2/2/2022     Destination Coordination complete.    Service Provider Request Status Selected Services Address Phone Fax Patient Preferred    MATEO [facility]CKEE   Selected Intermediate Care [address] [facility] [address] [phone] --    [facility]  Pending - Request Sent N/A [address] [facility] [address] [phone] --    [facility]  Pending - Request Sent N/A [address] [facility] [address] [phone] --    [facility]  Pending - Request Sent N/A [address] [facility] [address] [phone] --    [facility]  Pending - Request Sent N/A [address] [facility] [address] [phone] --    [facility]  Pending - Request Sent N/A [address] [facility] [facility] [address] [phone] --    [facility]  Pending - Request Sent N/A [address] [facility] [address] [phone] --    [facility]  Pending - Request Sent N/A [address] [facility] [facility] [address] [phone] --    [facility]  Pending - Request Sent N/A [address], [facility] [address] [phone]  [phone] --    [facility]  Pending - Request Sent N/A [address] [facility] [address] [phone] --    [facility] [place]  Pending - Request Sent N/A [address] [facility] [address] [phone] --    [facility]  Pending - Request Sent N/A [address] [facility] [address] [phone] --    [facility]  Pending - Request Sent N/A [address] [facility] [address] [phone] --    [facility]  Pending - Request Sent N/A [address] [facility] [address] [phone] --    [facility]  Pending - Request Sent N/A [address] [facility] [address] [phone] --    [facility]  Pending - No Request Sent N/A [address] [facility] [address] [phone] -- --    [facility]  Declined  No Medicaid Bed Available N/A [address] [facility] [address] [phone] --    [facility] REH[facility]ITATION  Declined  No Bed Available N/A [address] [facility] [address] [phone] --    [facility]  Declined  No Medicaid Bed Available N/A [address] [facility] [address] [phone] --    [facility]  Declined  No Bed Available N/A [address] [facility] [address] [phone] --    [facility]  Declined  unable to accept due to no covid-19 vaccine N/A [address] [facility] [address] [phone] --    [facility]  Declined  No Bed Available N/A [address] [facility] [address] [phone] --    [facility]  Declined  No Bed Available N/A [address] [facility] [address] [phone] --    [facility]  Declined  Admissions HOLD N/A [address] [facility] [address] [phone] --    [facility] [place]  Declined  No Medicaid Bed Available N/A [address], [facility] [address] [phone] --    [facility]  Declined  No Medicaid Bed Available N/A [address] [facility] [facility] [address] [phone] --    [facility]  Declined  No Bed Available N/A [address] [facility] [address] [phone] --    [facility]  Declined  No Medicaid Bed Available N/A [address] [facility] [address] [phone] --    [facility]  Declined  Bed not available N/A [address] [facility] [address] [phone] --              Expected Discharge Date and Time     Expected Discharge Date Expected Discharge Time    Mar 11, 2022          Demographic Summary    No documentation.                Functional Status    No documentation.                Psychosocial    No documentation.                Abuse/Neglect    No documentation.                Legal    No documentation.                Substance Abuse    No documentation.                Patient Forms    No documentation.                   Gia Smalls, RN

## 2022-03-11 NOTE — PROGRESS NOTES
Discharge Planning Assessment  [facility]     Patient Name: Jose Angel Us  MRN: [id number]  Today's Date: 3/11/2022    Admit Date: 2/2/2022     Discharge Needs Assessment    No documentation.                Discharge Plan     Row Name 03/11/22 0955       Plan    Plan Comments [facility] can accept to a palliative bed, medicaid bed with [facility] to follow. Spoke with the patient and he is agreeable to the discharge plans. EDWIN Smalls, RN, CCP.              Continued Care and Services - Admitted Since 2/2/2022     Destination Coordination complete.    Service Provider Request Status Selected Services Address Phone Fax Patient Preferred    [facility]ATE[facility] MCKEE   Selected Intermediate Care [address] [facility] [address] [phone] --    [facility]  Pending - Request Sent N/A [address] [facility] [address] [phone] --    [facility]  Pending - Request Sent N/A [address] [facility] [address] [phone] --    [facility]  Pending - Request Sent N/A [address] [facility] [address] [phone] --    [facility]  Pending - Request Sent N/A [address] [facility] [address] [phone] --    [facility]  Pending - Request Sent N/A [address] [facility] [address] [phone] --    [facility]  Pending - Request Sent N/A [address] [facility] [address] [phone] --    [facility]  Pending - Request Sent N/A [address] [facility] [facility] [address] [phone] --    [facility]  Pending - Request Sent N/A [address] [facility] [address] [phone] --    [facility]  Pending - Request Sent N/A [address] [facility] [address] [phone] --    [facility]  Pending - Request Sent N/A  [address] [facility] [facility] [address] [phone] --    [facility]  Pending - Request Sent N/A [address] [facility] [address] [phone] --    [facility]  Pending - Request Sent N/A [address] [facility] [address] [phone] --    [facility] HEALTH AND REHABILITATION  Pending - Request Sent N/A [address] [facility] [address] [phone] --    [facility]  Pending - Request Sent N/A [address] [facility] [address] [phone] --    [facility]  Pending - No Request Sent N/A [address] [facility] [address] [phone] -- --    [facility]  Declined  No Medicaid Bed Available N/A [address] [facility] [address] [phone] --    [facility] REHABILITATION  Declined  No Bed Available N/A [address] [facility] [address] [phone] --    [facility]  Declined  No Medicaid Bed Available N/A [address] [facility] [address] [phone] --    [facility]  Declined  No Bed Available N/A [address] [facility] [address] [phone] --    [facility]  Declined  unable to accept due to no covid-19 vaccine N/A [address] [facility] [facility] [address] [phone] --    [facility] REHABILITATION  Declined  No Bed Available N/A [address] [facility] [address] [phone] --    [facility]  Declined  No Bed Available N/A [address] [facility] [address] [phone] --    [facility]  Declined  Admissions HOLD N/A 2[address] [facility] [address] [phone] --    [facility]  Declined  No Medicaid Bed Available N/A [address], [facility] [address] [phone] --    [facility]  Declined  No Medicaid Bed Available N/A [address] [facility] [facility] [address] [phone] --    [facility]  Declined  No Bed Available N/A [address] [facility] [facility] [address] [phone] --    [facility]  Declined  No Medicaid Bed Available N/A [address] [facility] [address] [phone] --    [facility]  Declined  Bed not available N/A [address] [facility] [address] [phone] --              Expected Discharge Date and Time     Expected Discharge Date Expected Discharge Time    Mar 11, 2022          Demographic Summary    No documentation.                Functional Status    No documentation.                Psychosocial    No documentation.                Abuse/Neglect    No documentation.                Legal    No documentation.                Substance Abuse    No documentation.                Patient Forms    No documentation.                   Gia Smalls, RN

## 2022-03-11 NOTE — PLAN OF CARE
Goal Outcome Evaluation:           Progress: no change  Outcome Evaluation: Pt pleasant and rested today. Wound changed per order. [facility]. Support provided to patient. Will cont to monitor

## 2022-03-11 NOTE — PLAN OF CARE
Problem: Adult Inpatient Plan of Care  Goal: Patient-Specific Goal (Individualized)  Outcome: Ongoing, Progressing  Flowsheets (Taken 3/10/2022 1816)  Patient-Specific Goals (Include Timeframe): NA  Individualized Care Needs: Patient likes cranberry juice  Anxieties, Fears or Concerns: NA   Goal Outcome Evaluation:           Progress: no change  Outcome Evaluation: Patient alert. No complaints this night shift. Resting well. VSS. Will continue to monitor for discomfort. Patient refusing turns at times. RN educated.

## 2022-03-11 NOTE — PROGRESS NOTES
"Physicians Statement of Medical Necessity for  Ambulance Transportation    GENERAL INFORMATION     Name: Jose Angel Us  YOB: 1950  Medicare #: [employer] Medicare replacement #[id number] and [place] Medicaid #[id number]  Transport Date: 3/11/22(Valid for round trips this date, or for scheduled repetitive trips for 60 days from the date signed below.)  Origin: [address]   [facility] [address]  Is the Patient's stay covered under Medicare Part A (PPS/DRG?)Yes  Closest appropriate facility? Yes  If this a hosp-hosp transfer? No  Is this a hospice patient? No    MEDICAL NECESSITY QUESTIONAIRE    Ambulance Transportation is medically necessary only if other means of transportation are contraindicated or would be potentially harmful to the patient.  To meet this requirement, the patient must be either \"bed confined\" or suffer from a condition such that transport by means other than an ambulance is contraindicated by the patient's condition.  The following questions must be answered by the healthcare professional signing below for this form to be valid:     1) Describe the MEDICAL CONDITION (physical and/or mental) of this patient AT THE TIME OF AMBULANCE TRANSPORT that requires the patient to be transported in an ambulance, and why transport by other means is contraindicated by the patient's condition:   Past Medical History:   Diagnosis Date   • Anemia    • BPH (benign prostatic hyperplasia)    • Cancer (HCC)    • Hyperlipidemia    • Hypertension       Past Surgical History:   Procedure Laterality Date   • HERNIA REPAIR  1950   • INSERT CENTRAL LINE AT BEDSIDE  2/7/2022        • VENA CAVA FILTER INSERTION N/A 2/8/2022    Procedure: VENA CAVA FILTER INSERTION;  Surgeon: Ligia Méndez MD;  Location: [facility] 18/19;  Service: Vascular;  Laterality: N/A;      2) Is this patient \"bed confined\" as defined below?Yes   To be \"bed " "confined\" the patient must satisfy all three of the following criteria:  (1) unable to get up from bed without assistance; AND (2) unable to ambulate;  AND (3) unable to sit in a chair or wheelchair.  3) Can this patient safely be transported by car or wheelchair van (I.e., may safely sit during transport, without an attendant or monitoring?)No   4. In addition to completing questions 1-3 above, please check any of the following conditions that apply*:          *Note: supporting documentation for any boxes checked must be maintained in the patient's medical records Unable to tolerate seated position for time needed to transport      SIGNATURE OF PHYSICIAN OR OTHER AUTHORIZED HEALTHCARE PROFESSIONAL    I certify that the above information is true and correct based on my evaluation of this patient, and represent that the patient requires transport by ambulance and that other forms of transport are contraindicated.  I understand that this information will be used by the Centers for Medicare and Medicaid Services (CMS) to support the determiniation of medical necessity for ambulance services, and I represent that I have personal knowledge of the patient's condition at the time of transport.     X   If this box is checked, I also certify that the patient is physically or mentally incapable of signing the ambulance service's claim form and that the institution with which I am affiliated has furnished care, services or assistance to the patient.  My signature below is made on behalf of the patient pursuant to 42 .36(b)(4). In accordance with 42 .37, the specific reason(s) that the patient is physically or mentally incapable of signing the claim for is as follows:     Signature of Physician or Healthcare Professional  Date/Time:  3/11/22     (For Scheduled repetitive transport, this form is not valid for transports performed more than 60 days after this date).                                                    "                                                                                         --------------------------------------------------------------------------------------------  Printed Name and Credentials of Physician or Authorized Healthcare Professional     *Form must be signed by patient's attending physician for scheduled, repetitive transports,.  For non-repetitive ambulance transports, if unable to obtain the signature of the attending physician, any of the following may sign (please select below):     Physician  Clinical Nurse Specialist  X Registered Nurse     Physician Assistant  Discharge Planner  Licensed Practical Nurse     Nurse Practitioner

## 2022-03-11 NOTE — DISCHARGE SUMMARY
[facility] Medicine Services  DISCHARGE SUMMARY    Patient Name: Jose Angel Us  : 1950  MRN: [id number]    Date of Admission: 2022  9:59 AM  Date of Discharge: 3/11/2022  Primary Care Physician: Tarik Willis APRN    Consults     Date and Time Order Name Status Description    3/1/2022 11:18 AM Inpatient Infectious Diseases Consult Completed     2022  9:40 AM Inpatient Cardiology Consult      2022 10:33 AM Inpatient Pulmonology Consult      2022  7:37 AM Inpatient General Surgery Consult Completed     2022  4:01 PM Inpatient Urology Consult      2022  7:39 AM Hematology & Oncology Inpatient Consult      2022  9:10 AM Inpatient Cardiology Consult Completed     2022  9:09 AM Inpatient Nephrology Consult Completed     2022  9:09 AM Inpatient Gastroenterology Consult      2022  9:55 PM Inpatient Pulmonology Consult      2022  3:50 PM Inpatient Vascular Surgery Consult Completed           Hospital Course       Active Hospital Problems    Diagnosis  POA   • Cytokine release syndrome, grade 1 [D89.831]  No   • Comfort measures only status [Z51.5]  Not Applicable   • Nontraumatic retroperitoneal hematoma [K66.1]  No   • Severe malnutrition (CMS/HCC) [E43]  Yes   • Acute deep vein thrombosis (DVT) of left lower extremity  [I82.402]  Yes   • 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 male presents to the hospital with necrotic left ischemic foot due to arterial occlusion  and has refused recommended amputation.   additionally he has C. difficile colitis and COVID-19 infection.  He has opted to transition to comfort focused care and is being discharged to a skilled facility with [facility] following.  Currently is doing well with supportive care and symptomatic treatment.  Aside from comfort care as he does wish to continue on treatment with his oral vancomycin for the C. difficile diarrhea as this provide some comfort from the colitis.  Please see the number of doses remaining as per medicine list below.  Patient is very comfortable today and eager to transfer to facility.    Necrotic left foot with sepsis  - due to arterial occlusion  - treated with cefepime and vancomycin but this has recurred  - patient refuses amputation, he has gangrene in the foot.  He has requested transition to comfort measures only and wants hospice at a facility.     C Diff Diarrhea  - much improved  - continue on oral vancomycin and cholestyramine     COVID-19 pneumonia with acute hypoxic respiratory failure  - hypoxemia resolved  - treated with decadron     Left foot gangrene/Tibial Occlusion  - present on admission  - was placed on anticoagulation but he developed retroperitoneal hematoma so this had to be discontinued  - appreciate vascular surgery recs, they recommended amputation but he declined this as per above     Acute DVT left popliteal peroneal soleal  - present on admission  - status post IVC filter 2/8     Retroperitoneal hematoma/hemorrhagic shock  - Initial event associated with anticoagulation which is now stopped  - Status post IVC filter.      Anemia  - due to acute blood loss superimposed on anemia of inflammation/chronic disease  - Status post transfusion 6 units packed RBCs and ferric gluconate infusion  - no evidence of ongoing bleeding, patient does not want further work-up     Thrombocytopenia  - consumptive due to hemorrhage, now resolved  - HIT antibody negative, patient does not  want further work-up     Transaminitis: Resolved.     Urinary retention/clot  - Santana was replaced for retention. Urology saw during the hospitalization  - hematuria resolved, patient does not want further work-up     PAF  - now in NSR  - not an A/C candidate and does not want anticoagulation     CARI on CKD 3  - Secondary to prerenal/ATN/sepsis/retention, patient does not want further work-up- resolved, appreciate nephrology recs     Continue palliative measures.      DNR.    Please note that this note was made using Dragon voice recognition software        Day of Discharge     HPI:   Wants to transfer to continue comfort measures and would like hospice care in the facility.  No other new complaints.  Patient says his symptoms are well controlled.    No current fevers or chills  No current shortness of breath or cough  No current chest pain or palpitations      Vital Signs:   Temp:  [97.5 °F (36.4 °C)-99.2 °F (37.3 °C)] 99.2 °F (37.3 °C)  Heart Rate:  [96-99] 99  Resp:  [16] 16  BP: (126-139)/(72-78) 139/78     Physical Exam:    Constitutional:Awake, alert  HENT: NCAT, mucous membranes moist, neck supple  Respiratory: Clear to auscultation bilaterally, respiratory effort normal, nonlabored breathing   Cardiovascular: RRR, normal radial pulses  Gastrointestinal: Positive bowel sounds, soft, nontender, nondistended  Musculoskeletal: Thin frail chronically debilitated appearance, left foot with dressing intact and no active drainage,muscle wasting and BMI only 17, no lower extremity edema  Psychiatric: Appropriate affect, cooperative, conversational  Neurologic: No slurred speech or facial droop, follows commands  Skin: No rashes or jaundice, warm    Pertinent  and/or Most Recent Results               Invalid input(s): PROT, LABALBU        Invalid input(s): TG, LDLCALC, LDLREALC        Brief Urine Lab Results  (Last result in the past 365 days)      Color   Clarity   Blood   Leuk Est   Nitrite   Protein   CREAT   Urine  HCG        03/03/22 0636 Yellow   Clear   Negative   Negative   Negative   Negative                 Microbiology Results Abnormal     Procedure Component Value - Date/Time    Blood Culture - Blood, Hand, Left [[id number]]  (Normal) Collected: 03/04/22 1114    Lab Status: Final result Specimen: Blood from Hand, Left Updated: 03/09/22 1132     Blood Culture No growth at 5 days    Blood Culture - Blood, Arm, Right [[id number]]  (Normal) Collected: 03/04/22 1106    Lab Status: Final result Specimen: Blood from Arm, Right Updated: 03/09/22 1132     Blood Culture No growth at 5 days    Blood Culture - Blood, Arm, Right [[id number]]  (Normal) Collected: 02/13/22 1235    Lab Status: Final result Specimen: Blood from Arm, Right Updated: 02/18/22 1315     Blood Culture No growth at 5 days    Blood Culture - Blood, Arm, Right [[id number]]  (Normal) Collected: 02/13/22 0655    Lab Status: Final result Specimen: Blood from Arm, Right Updated: 02/18/22 0730     Blood Culture No growth at 5 days    Blood Culture - Blood, Arm, Left [[id number]]  (Normal) Collected: 02/02/22 1316    Lab Status: Final result Specimen: Blood from Arm, Left Updated: 02/07/22 1330     Blood Culture No growth at 5 days    Blood Culture - Blood, Arm, Left [[id number]]  (Normal) Collected: 02/02/22 1316    Lab Status: Final result Specimen: Blood from Arm, Left Updated: 02/07/22 1330     Blood Culture No growth at 5 days    MRSA Screen, PCR (Inpatient) - Swab, Nares [[id number]]  (Normal) Collected: 02/07/22 1132    Lab Status: Final result Specimen: Swab from Nares Updated: 02/07/22 1317     MRSA PCR No MRSA Detected          Imaging Results (All)     Procedure Component Value Units Date/Time    CT Abdomen Pelvis Without Contrast [[id number]] Collected: 03/03/22 1738     Updated: 03/03/22 1751    Narrative:      CT ABDOMEN PELVIS WO CONTRAST-     CLINICAL HISTORY: Follow-up hematoma     TECHNIQUE: Viral CT images were acquired through the abdomen and pelvis  with  no oral or IV contrast and were reconstructed in 3 mm thick slices.     Radiation dose reduction techniques were utilized, including automated  exposure control and exposure modulation based on body size.     COMPARISON: CT scan of the abdomen and pelvis dated 02/13/2022.     FINDINGS: Again seen is a complex retroperitoneal hematoma within the  left flank region of the abdomen extending to the left hemipelvis that  shows overall decrease in size. The hematoma has intramuscular and  external muscular components. The extra muscular component measures 10.6  x 8.7 x 6.8 cm. Previously it measured 13.4 x 11.4 x 7.1 cm. The  intramuscular portion of the hematoma within the left iliac is muscle  and left psoas muscle has also diminished in overall size and density in  the interval. No new hemorrhage is evident. No intraperitoneal blood is  identified. The liver, spleen, pancreas, and adrenal glands appear  within normal limits. The left kidney is anteriorly displaced by the  hematoma but is otherwise unremarkable. The degree of renal displacement  has diminished along with the size of the hematoma. The right kidney is  unremarkable. There is no hydronephrosis. An inferior vena cava filter  remains in satisfactory position without change. The stomach and small  bowel appear normal. There is no bowel distention. There are multiple  diverticuli in the sigmoid colon. There is no CT evidence of acute  diverticulitis. Images through the lung bases demonstrate small  bilateral pleural effusions that have diminished in size slightly. There  is peribronchial thickening and patchy reticulonodular opacities in the  inferior aspect of the right lung that are unchanged, and are quite  likely sequela of chronic bronchitis.       Impression:      Large retroperitoneal hematoma within the left side of the  abdomen and left hemipelvis with intra and extra muscular components  showing overall decrease in size and density since the  preceding CT  dated 02/13/2020.  No new hemorrhage is identified. No other acute process is identified in  the abdomen or pelvis. There is moderate sigmoid diverticulosis without  evidence of diverticulitis. Small bilateral pleural effusions have  diminished in size.     This report was finalized on 3/3/2022 5:48 PM by Dr. Jose Angel Whitaker M.D.       XR Abdomen KUB [[id number]] Collected: 03/02/22 1110     Updated: 03/02/22 1119    Narrative:      XR ABDOMEN KUB-     Clinical: Ileus     COMPARISON CT of the abdomen and pelvis 2/13/2022     FINDINGS: The colon caliber is normal. Typical formed fecal material  within. Few mildly prominent loops of small bowel suggested within the  mid to right abdomen. There is an IVC filter in position. Lateral  abdominal wall soft tissue planes are preserved. There is a small  left-sided pleural effusion, airspace disease at the right lung base.  The remainder of examination is unremarkable.     This report was finalized on 3/2/2022 11:16 AM by Dr. Cheikh Stapleton M.D.       XR Chest Post CVA Port [[id number]] Collected: 02/13/22 0959     Updated: 02/13/22 1003    Narrative:      XR CHEST POST CVA PORT-     INDICATIONS: Central line placement     TECHNIQUE: Frontal view of the chest     COMPARISON: 02/07/2022     FINDINGS:     Previous right IJ catheter is no longer seen. Left IJ catheter extends  to the superior vena cava. The heart size is normal. Aorta appears  tortuous. Pulmonary vasculature is unremarkable. Bilateral basilar  infiltrates/atelectasis are apparent, as well as mild bilateral pleural  effusions. No pneumothorax. Otherwise stable.       Impression:         Central line placement. No pneumothorax. Persistent bilateral basilar  infiltrates/atelectasis, mild pleural effusions.     This report was finalized on 2/13/2022 10:00 AM by Dr. Melvin Vela M.D.       CT Abdomen Pelvis Without Contrast [[id number]] Collected: 02/13/22 0541     Updated: 02/13/22 0619     Addenda:        ADDENDUM:  02 13 22 06:18 Verify Receipt Verified receipt with MARIE Williamson on 02 13 06:  18 (-05:00)       Signed: 02/13/22 0540 by Liset Shi MD    Narrative:      CR  Patient: CRISTÓBAL CORTEZ  Time Out: 05:40  Exam(s): CT ABDOMEN + PELVIS Without Contrast     EXAM:    CT Abdomen and Pelvis Without Intravenous Contrast    CLINICAL HISTORY:    Abdominal pain.    TECHNIQUE:    Axial computed tomography images of the abdomen and pelvis without   intravenous contrast.  CTDI is 15.1 mGy and DLP is 804.9 mGy-cm.  This CT   exam was performed according to the principle of ALARA (As Low As   Reasonably Achievable) by using one or more of the following dose   reduction techniques: automated exposure control, adjustment of the mA   and or kV according to patient size, and or use of iterative   reconstruction technique.    COMPARISON:    CT abdomen pelvis 2 8 2022    FINDINGS:    Lung bases:  Unremarkable.  No mass.  No consolidation.    Pleural space:  Moderate bilateral pleural effusions.  Airspace   opacities of the right lower and middle lobes and to a lesser degree the   left lower lobe are most concerning for atypical infection.     ABDOMEN:    Liver:  Unremarkable.    Gallbladder and bile ducts:  Question gallbladder sludge.  No calcified   stones.  No ductal dilation.    Pancreas:  Unremarkable.  No ductal dilation.    Spleen:  Unremarkable.  No splenomegaly.    Adrenals:  Unremarkable.  No mass.    Kidneys and ureters:  Nonobstructing 4 mm left renal calculus.  No   hydronephrosis of either kidney.    Stomach and bowel:  Diverticulosis.  No obstruction.  No mucosal   thickening.     PELVIS:    Appendix:  No findings to suggest acute appendicitis.    Bladder:  The urinary bladder is decompressed secondary to a Santana   catheter.  No stones.    Reproductive:  Unremarkable as visualized.     ABDOMEN and PELVIS:    Intraperitoneal space:  Unremarkable.  No free air.  No significant   fluid collection.     Bones joints:  There are degenerative changes spine.  No fracture.    Soft tissues:  Hematomas of the left iliopsoas muscle measures 4.8 x 5.  7 x 21.2 cm.  An additional hemorrhage of the iliac is muscle measured 8.  0 x 4.5 x 17.5 cm.  There is a moderate amount of blood in the left   abdomen extending from these hematomas.  There is diffuse body wall edema.      Vasculature:  There is an IVC filter.  Mild atherosclerosis.  No   aneurysm.    Lymph nodes:  Unremarkable.  No enlarged lymph nodes.    IMPRESSION:       1.  Hematomas of the left iliopsoas muscle measures 4.8 x 5.7 x 21.2 cm.    An additional hemorrhage of the iliac is muscle measured 8.0 x 4.5 x 17.5   cm.  There is a moderate amount of blood in the left abdomen extending   from these hematomas.  2.  Moderate bilateral pleural effusions.  Airspace opacities of the   right lower and middle lobes and to a lesser degree the left lower lobe   are most concerning for atypical infection.  3.  Diverticulosis.  4.  There is diffuse body wall edema.  5.  Question gallbladder sludge.  6.  Nonobstructing 4 mm left renal calculus.  No hydronephrosis of either   kidney.      Impression:            Communications:     Verify Receipt     Electronically signed by Liset Shi MD on 02-13-22 at 0540    CT Abdomen Pelvis Without Contrast [[id number]] Collected: 02/08/22 0948     Updated: 02/09/22 1254    Narrative:      CT ABDOMEN AND PELVIS WITHOUT CONTRAST     HISTORY: Suspected retroperitoneal hematoma.     TECHNIQUE: Axial CT images of the abdomen and pelvis were obtained  without administration of intravenous contrast. The patient was not  given oral contrast. Coronal and sagittal reformats were obtained.     COMPARISON: None.     FINDINGS: There is a large left-sided retroperitoneal hematoma. This  demonstrates dependent hyperdensity and extends approximately 16 cm in  craniocaudal dimension. It demonstrates an AP dimension of approximately  11.3 cm and extends  to involve medially the psoas muscle as well. The  left kidney is displaced anteriorly and medially by this large hematoma.  There is also fluid within the pelvis demonstrating a dependent  hyperdensity consistent with hematoma.     There is diffuse anasarca present. There are bilateral small to  moderate-sized partly imaged layering pleural effusions with  subsegmental dense consolidation within the bilateral lower lobes and  right middle lobe. The aerated lung fields demonstrate diffuse patchy  ground glass infiltrates as well. These findings are consistent with  pneumonia.     The liver, spleen, pancreas are normal. Gallbladder appears to be  unremarkable. There is a nonobstructing left renal stone. The urinary  bladder is decompressed with a Santana catheter. No evidence of bowel  obstruction. Marked colonic diverticulosis is present. There is moderate  calcified atherosclerotic plaque seen within the abdominal aorta and its  branches. Distal abdominal aortic ectasia measuring up to 2.1 cm. There  is L5-S1 degenerative disc disease with vacuum disc phenomenon.       Impression:      Large left-sided retroperitoneal hematoma as described  above. Small pelvic fluid with dependent hyperdensity suggestive of  blood products as well. There is diffuse anasarca. Bilateral  small-to-moderate partly imaged pleural effusions consistent with  bilateral pneumonia.     These findings were discussed at the time of dictation with the  patient's nurse Salma by telephone.     Radiation dose reduction techniques were utilized, including automated  exposure control and exposure modulation based on body size.     This report was finalized on 2/9/2022 12:50 PM by Dr. Rohini Ferrer M.D.       Arteriogram (Autofinalize) [[id number]] Resulted: 02/08/22 1743     Updated: 02/08/22 1743    Narrative:      This procedure was auto-finalized with no dictation required.    XR Chest Post CVA Port [[id number]] Collected: 02/07/22 0536      Updated: 02/07/22 0540    Narrative:      SINGLE VIEW OF THE CHEST     HISTORY: Central line placement     COMPARISON: 02/02/2022     FINDINGS:  Right internal jugular vein central venous line terminates within the  superior vena cava. Cardiac silhouette is unchanged. No pneumothorax is  seen. There is increasing bibasilar consolidation. There are bilateral  pleural effusions.       Impression:         1. Right internal jugular vein central venous line appears to terminate  in satisfactory position. No pneumothorax seen.  2. Dense bibasilar infiltrates, worsened when compared to prior exam.     This report was finalized on 2/7/2022 5:37 AM by Dr. Ritu Johnson M.D.       US Sonosite Portable [[id number]] Resulted: 02/07/22 0437     Updated: 02/07/22 0437    Narrative:      This procedure was auto-finalized with no dictation required.    XR Chest 1 View [[id number]] Collected: 02/02/22 1120     Updated: 02/02/22 1526    Narrative:      XR CHEST 1 VW-     HISTORY: 71-year-old male with a cough.     FINDINGS: There are extensive airspace consolidations at both lower  lobes, greater on the right. There are also likely airspace  consolidations at the right middle lobe and lingula. A small airspace  opacity is also noted at the inferior aspect of the right upper lobe.  Followup of the multifocal pneumonia is recommended. There is no  evidence for CHF.     This report was finalized on 2/2/2022 3:22 PM by Dr. Margaret Jackson M.D.             Results for orders placed during the hospital encounter of 02/02/22    Duplex Lower Extremity Art / Grafts - Left CAR    Interpretation Summary  · The external iliac and femoral-popliteal systems are all widely patent. The peroneal is patent to the ankle. The proximal anterior tibial and posterior tibial joellen apperies are patent but appear to be occluded at the ankle. This includes the dorsalis pedis artery.      Results for orders placed during the hospital encounter of  02/02/22    Duplex Lower Extremity Art / Grafts - Left CAR    Interpretation Summary  · The external iliac and femoral-popliteal systems are all widely patent. The peroneal is patent to the ankle. The proximal anterior tibial and posterior tibial joellen apperies are patent but appear to be occluded at the ankle. This includes the dorsalis pedis artery.      Results for orders placed during the hospital encounter of 02/02/22    Adult Transthoracic Echo Complete W/ Cont if Necessary Per Protocol    Interpretation Summary  · Left ventricular ejection fraction appears to be 56 - 60%.  · Left ventricular diastolic function was normal.  · There is moderate, bileaflet mitral valve thickening present.  · Mild mitral valve regurgitation is present.  · Mild tricuspid valve regurgitation is present.  · Mild dilation of the ascending aorta is present.      Discharge Details        Discharge Medications      New Medications      Instructions Start Date   acetaminophen 325 MG tablet  Commonly known as: TYLENOL   650 mg, Oral, Every 6 Hours PRN      albuterol sulfate  (90 Base) MCG/ACT inhaler  Commonly known as: PROVENTIL HFA;VENTOLIN HFA;PROAIR HFA   2 puffs, Inhalation, Every 6 Hours PRN      benzonatate 100 MG capsule  Commonly known as: TESSALON   100 mg, Oral, 3 Times Daily PRN      calcium carbonate 500 MG chewable tablet  Commonly known as: TUMS   2 tablets, Oral, 2 Times Daily PRN      cholestyramine 4 g packet  Commonly known as: QUESTRAN   1 packet, Oral, 2 Times Daily      collagenase 250 UNIT/GM ointment   1 application, Topical, Every 24 Hours Scheduled   Start Date: March 12, 2022     dilTIAZem 30 MG tablet  Commonly known as: CARDIZEM   30 mg, Oral, Every 8 Hours Scheduled      famotidine 20 MG tablet  Commonly known as: PEPCID   20 mg, Oral, 2 Times Daily Before Meals      melatonin 1 MG tablet   1 mg, Oral, Nightly PRN      muscle rub 10-15 % cream cream   Topical, Every 1 Hour PRN      ondansetron 4 MG  tablet  Commonly known as: ZOFRAN   4 mg, Oral, Every 6 Hours PRN      vancomycin 125 MG capsule  Commonly known as: VANCOCIN   125 mg, Oral, Every 6 Hours Scheduled, 22 more doses needed then stop         Stop These Medications    amLODIPine 10 MG tablet  Commonly known as: NORVASC     metoprolol tartrate 50 MG tablet  Commonly known as: LOPRESSOR            Allergies   Allergen Reactions   • Ciprofloxacin Unknown (See Comments)     Patient is unsure what reaction he had, severe sinus pressure   • Grass    • Pollen Extract    • Sulfa Antibiotics Rash         Discharge Disposition:  Skilled Nursing Facility (DC - External)    Diet:  Hospital:  Diet Order   Procedures   • Diet Regular; Thin       Activity:  Activity Instructions     Activity as Tolerated      Up WIth Assist                 CODE STATUS:    Code Status and Medical Interventions:   Ordered at: 03/04/22 3191     Level Of Support Discussed With:    Patient     Code Status (Patient has no pulse and is not breathing):    No CPR (Do Not Attempt to Resuscitate)     Medical Interventions (Patient has pulse or is breathing):    Comfort Measures       Future Appointments   Date Time Provider Department Center   5/16/2022 11:30 AM Tarik Willis APRN [facility]  [facility]       Additional Instructions for the Follow-ups that You Need to Schedule     Discharge Follow-up with PCP   As directed       Currently Documented PCP:    Tarik Willis APRN    PCP Phone Number:    [phone]     Follow Up Details: as needed         Discharge Follow-up with Specified Provider: Follow-up with facility provider for palliative needs; 2 Days   As directed      To: Follow-up with facility provider for palliative needs    Follow Up: 2 Days                     Jacob Baires MD  03/11/22      Time Spent on Discharge:  I spent greater than 30 minutes on this discharge activity which included: face-to-face encounter with the patient, reviewing the data in the system, coordination  of the care with the nursing staff as well as consultants, documentation, and entering orders.

## 2022-03-12 NOTE — NURSING NOTE
Per charting, patient came in with clothes, shoe, and wallet. Clothes and shoes were present in room but no wallet. Patient stated he thinks his sister has his wallet. RN called sister regarding wallet and there was no answer.

## 2023-05-02 ENCOUNTER — TELEPHONE (OUTPATIENT)
Dept: INTERNAL MEDICINE | Age: 73
End: 2023-05-02

## 2023-05-02 ENCOUNTER — OFFICE VISIT (OUTPATIENT)
Dept: INTERNAL MEDICINE | Age: 73
End: 2023-05-02
Payer: MEDICARE

## 2023-05-02 VITALS
SYSTOLIC BLOOD PRESSURE: 124 MMHG | HEART RATE: 62 BPM | DIASTOLIC BLOOD PRESSURE: 78 MMHG | BODY MASS INDEX: 18.52 KG/M2 | WEIGHT: 122.2 LBS | OXYGEN SATURATION: 95 % | TEMPERATURE: 98.4 F | HEIGHT: 68 IN

## 2023-05-02 DIAGNOSIS — I10 PRIMARY HYPERTENSION: ICD-10-CM

## 2023-05-02 DIAGNOSIS — I70.203 ATHEROSCLEROSIS OF NATIVE ARTERY OF BOTH LOWER EXTREMITIES, WITH UNSPECIFIED PRESENCE OF CLINICAL MANIFESTATION: ICD-10-CM

## 2023-05-02 DIAGNOSIS — I96 GANGRENE OF LEFT FOOT: ICD-10-CM

## 2023-05-02 DIAGNOSIS — I05.9 MITRAL VALVE DISORDER: ICD-10-CM

## 2023-05-02 DIAGNOSIS — Z59.82 TRANSPORTATION INSECURITY: ICD-10-CM

## 2023-05-02 DIAGNOSIS — E78.00 HYPERCHOLESTEROLEMIA: ICD-10-CM

## 2023-05-02 DIAGNOSIS — D50.9 IRON DEFICIENCY ANEMIA, UNSPECIFIED IRON DEFICIENCY ANEMIA TYPE: ICD-10-CM

## 2023-05-02 DIAGNOSIS — Z89.512 HX OF BKA, LEFT: ICD-10-CM

## 2023-05-02 DIAGNOSIS — Z59.86 FINANCIAL INSECURITY: ICD-10-CM

## 2023-05-02 DIAGNOSIS — I82.452 DEEP VENOUS THROMBOSIS (DVT) OF LEFT PERONEAL VEIN, UNSPECIFIED CHRONICITY: ICD-10-CM

## 2023-05-02 DIAGNOSIS — Z91.09 ENVIRONMENTAL ALLERGIES: ICD-10-CM

## 2023-05-02 DIAGNOSIS — Z12.5 SCREENING PSA (PROSTATE SPECIFIC ANTIGEN): ICD-10-CM

## 2023-05-02 DIAGNOSIS — Z86.79 HISTORY OF ATRIAL FIBRILLATION: Primary | ICD-10-CM

## 2023-05-02 RX ORDER — CETIRIZINE HYDROCHLORIDE 10 MG/1
10 TABLET ORAL DAILY
Qty: 90 TABLET | Refills: 3 | Status: SHIPPED | OUTPATIENT
Start: 2023-05-02

## 2023-05-02 RX ORDER — GARLIC EXTRACT 500 MG
1 CAPSULE ORAL DAILY
Qty: 90 CAPSULE | Refills: 3 | Status: SHIPPED | OUTPATIENT
Start: 2023-05-02

## 2023-05-02 RX ORDER — FLUTICASONE PROPIONATE 50 MCG
1 SPRAY, SUSPENSION (ML) NASAL DAILY
COMMUNITY
Start: 2023-04-30 | End: 2023-05-02 | Stop reason: SDUPTHER

## 2023-05-02 RX ORDER — FLUTICASONE PROPIONATE 50 MCG
1 SPRAY, SUSPENSION (ML) NASAL DAILY
Qty: 9.9 ML | Refills: 1 | Status: SHIPPED | OUTPATIENT
Start: 2023-05-02

## 2023-05-02 RX ORDER — CETIRIZINE HYDROCHLORIDE 10 MG/1
10 TABLET ORAL DAILY
COMMUNITY
Start: 2023-04-30 | End: 2023-05-02 | Stop reason: SDUPTHER

## 2023-05-02 RX ORDER — FERROUS SULFATE 325(65) MG
325 TABLET ORAL
COMMUNITY
Start: 2023-04-30 | End: 2023-05-02 | Stop reason: SDUPTHER

## 2023-05-02 RX ORDER — FERROUS SULFATE 325(65) MG
325 TABLET ORAL
Qty: 90 TABLET | Refills: 1 | Status: SHIPPED | OUTPATIENT
Start: 2023-05-02 | End: 2024-05-01

## 2023-05-02 RX ORDER — HYDROCODONE BITARTRATE AND ACETAMINOPHEN 10; 325 MG/1; MG/1
1 TABLET ORAL
COMMUNITY

## 2023-05-02 SDOH — ECONOMIC STABILITY - TRANSPORTATION SECURITY: TRANSPORTATION INSECURITY: Z59.82

## 2023-05-02 SDOH — ECONOMIC STABILITY - INCOME SECURITY: FINANCIAL INSECURITY: Z59.86

## 2023-05-02 NOTE — TELEPHONE ENCOUNTER
I was unable to obtain PA .     It say  The requested drug is only covered under Part D Medicare benefit.

## 2023-05-02 NOTE — PROGRESS NOTES
I N T E R N A L  M E D I C I N E  TALIA SILVA      ENCOUNTER DATE:  05/02/2023    Jose Angel [place] / 72 y.o. / male      CHIEF COMPLAINT / REASON FOR OFFICE VISIT     Hypertension, Anemia, and arterial disease      ASSESSMENT & PLAN     Problem List Items Addressed This Visit        Cardiac and Vasculature    Hypertension    Relevant Medications    dilTIAZem (CARDIZEM) 30 MG tablet    metoprolol tartrate (LOPRESSOR) 25 MG tablet    Other Relevant Orders    Comprehensive Metabolic Panel    TSH+Free T4    Urinalysis With Microscopic If Indicated (No Culture) - Urine, Clean Catch    Hypercholesterolemia    Relevant Orders    Lipid Panel With / Chol / HDL Ratio   Other Visit Diagnoses     History of atrial fibrillation    -  Primary    Relevant Medications    rivaroxaban (XARELTO) 20 MG tablet    Other Relevant Orders    Ambulatory Referral to Cardiology    CBC w AUTO Differential    Mitral valve disorder        Relevant Medications    dilTIAZem (CARDIZEM) 30 MG tablet    metoprolol tartrate (LOPRESSOR) 25 MG tablet    Other Relevant Orders    Ambulatory Referral to Cardiology    CBC w AUTO Differential    Atherosclerosis of native artery of both lower extremities, with unspecified presence of clinical manifestation        Relevant Orders    CBC w AUTO Differential    Ambulatory Referral to Vascular Surgery (Completed)    Gangrene of left foot        Relevant Orders    Ambulatory Referral to Vascular Surgery (Completed)    Iron deficiency anemia, unspecified iron deficiency anemia type        Relevant Medications    B Complex-C (B Complex-Vitamin C) capsule    ferrous sulfate 325 (65 FE) MG tablet    Other Relevant Orders    CBC w AUTO Differential    Iron and TIBC    Ferritin    Deep venous thrombosis (DVT) of left peroneal vein, unspecified chronicity        Screening PSA (prostate specific antigen)        Relevant Orders    PSA Screen    Hx of BKA, left        Relevant Orders    Ambulatory Referral to Vascular  Surgery (Completed)    Ambulatory Referral to Home Health    Environmental allergies        Relevant Medications    cetirizine (zyrTEC) 10 MG tablet    fluticasone (FLONASE) 50 MCG/ACT nasal spray    Transportation insecurity        Relevant Orders    Ambulatory Referral to Home Health    Financial insecurity        Relevant Orders    Ambulatory Referral to Home Health        Orders Placed This Encounter   Procedures   • Comprehensive Metabolic Panel   • Lipid Panel With / Chol / HDL Ratio   • Iron and TIBC   • Ferritin   • TSH+Free T4   • Urinalysis With Microscopic If Indicated (No Culture) - Urine, Clean Catch   • PSA Screen   • Ambulatory Referral to Cardiology   • Ambulatory Referral to Vascular Surgery   • Ambulatory Referral to Home Health   • CBC w AUTO Differential     New Medications Ordered This Visit   Medications   • collagenase 250 UNIT/GM ointment     Sig: Apply 1 application topically to the appropriate area as directed Daily.   • B Complex-C (B Complex-Vitamin C) capsule     Sig: Take 1 each by mouth Daily.     Dispense:  90 capsule     Refill:  3   • cetirizine (zyrTEC) 10 MG tablet     Sig: Take 1 tablet by mouth Daily.     Dispense:  90 tablet     Refill:  3   • dilTIAZem (CARDIZEM) 30 MG tablet     Sig: Take 1 tablet by mouth Every 8 (Eight) Hours.     Dispense:  90 tablet     Refill:  3   • ferrous sulfate 325 (65 FE) MG tablet     Sig: Take 1 tablet by mouth Daily With Breakfast.     Dispense:  90 tablet     Refill:  1   • fluticasone (FLONASE) 50 MCG/ACT nasal spray     Si spray into the nostril(s) as directed by provider Daily.     Dispense:  9.9 mL     Refill:  1   • metoprolol tartrate (LOPRESSOR) 25 MG tablet     Sig: Take 0.5 tablets by mouth 2 (Two) Times a Day.     Dispense:  45 tablet     Refill:  3   • rivaroxaban (XARELTO) 20 MG tablet     Sig: Take 1 tablet by mouth Daily.     Dispense:  90 tablet     Refill:  3       SUMMARY/DISCUSSION  • Patient was given 2 weeks of samples  "of Xarelto today.  We were able to get him in with cardiology tomorrow at 3 PM from [place].      Next Appointment with me: Visit date not found    Return in about 1 month (around 6/2/2023) for Next scheduled follow up.      VITAL SIGNS     Visit Vitals  /78 (BP Location: Left arm, Patient Position: Sitting, Cuff Size: Small Adult)   Pulse 62   Temp 98.4 °F (36.9 °C) (Temporal)   Ht 172 cm (67.72\")   Wt 55.4 kg (122 lb 3.2 oz)   SpO2 95%   BMI 18.73 kg/m²     Wt Readings from Last 3 Encounters:   05/02/23 55.4 kg (122 lb 3.2 oz)   03/04/22 50.9 kg (112 lb 3.4 oz)   11/15/21 58.5 kg (129 lb)     Body mass index is 18.73 kg/m².      MEDICATIONS AT THE TIME OF OFFICE VISIT     Current Outpatient Medications on File Prior to Visit   Medication Sig   • acetaminophen (TYLENOL) 325 MG tablet Take 2 tablets by mouth Every 6 (Six) Hours As Needed for Mild Pain , Moderate Pain , Headache or Fever.   • HYDROcodone-acetaminophen (NORCO)  MG per tablet Take 1 tablet by mouth.   • [DISCONTINUED] B Complex-C (B COMPLEX-VITAMIN C PO) Take 1 capsule by mouth Daily.   • [DISCONTINUED] cetirizine (zyrTEC) 10 MG tablet Take 1 tablet by mouth Daily.   • [DISCONTINUED] collagenase 250 UNIT/GM ointment Apply 1 application topically to the appropriate area as directed Daily.   • [DISCONTINUED] dilTIAZem (CARDIZEM) 30 MG tablet Take 1 tablet by mouth Every 8 (Eight) Hours.   • [DISCONTINUED] ferrous sulfate 325 (65 FE) MG tablet Take 1 tablet by mouth.   • [DISCONTINUED] fluticasone (FLONASE) 50 MCG/ACT nasal spray 1 spray into the nostril(s) as directed by provider Daily.   • [DISCONTINUED] metoprolol tartrate (LOPRESSOR) 25 MG tablet Take 12.5 mg by mouth.   • [DISCONTINUED] rivaroxaban (XARELTO) 20 MG tablet Take 1 tablet by mouth Daily.   • [DISCONTINUED] albuterol sulfate  (90 Base) MCG/ACT inhaler Inhale 2 puffs Every 6 (Six) Hours As Needed for Shortness of Air.   • [DISCONTINUED] benzonatate (TESSALON) 100 MG " capsule Take 1 capsule by mouth 3 (Three) Times a Day As Needed for Cough. (Patient not taking: Reported on 5/2/2023)   • [DISCONTINUED] calcium carbonate (TUMS) 500 MG chewable tablet Chew 2 tablets 2 (Two) Times a Day As Needed for Indigestion or Heartburn. (Patient not taking: Reported on 5/2/2023)   • [DISCONTINUED] cholestyramine (QUESTRAN) 4 g packet Take 1 packet by mouth 2 (Two) Times a Day. (Patient not taking: Reported on 5/2/2023)   • [DISCONTINUED] famotidine (PEPCID) 20 MG tablet Take 1 tablet by mouth 2 (Two) Times a Day Before Meals. (Patient not taking: Reported on 5/2/2023)   • [DISCONTINUED] melatonin 1 MG tablet Take 1 tablet by mouth At Night As Needed for Sleep. (Patient not taking: Reported on 5/2/2023)   • [DISCONTINUED] muscle rub (BenGay) 10-15 % cream cream Apply  topically to the appropriate area as directed Every 1 (One) Hour As Needed (sore muscles). (Patient not taking: Reported on 5/2/2023)   • [DISCONTINUED] ondansetron (ZOFRAN) 4 MG tablet Take 1 tablet by mouth Every 6 (Six) Hours As Needed for Nausea or Vomiting. (Patient not taking: Reported on 5/2/2023)     No current facility-administered medications on file prior to visit.          HISTORY OF PRESENT ILLNESS     Patient has not been seen in our office since 2021, presents with hospital follow-up and subsequent rehabilitation stay for necrotic left foot with sepsis due to arterial occlusion requiring below the knee left amputation, he is now on a prosthetic, and has been discharged home, he lives alone.  Home health referral was placed but according to BRIELLE Clark who is the patient's niece [facility] does not take his insurance.  He has however moving around in his home well and able to take care of his ADLs within the home.  He is in need of [facility] for transportation.  He also had an acute DVT left popliteal peroneal soleal, patient states most of this was provoked by COVID-19.  He is now on Xarelto 20 mg daily but has taken  his last tablet today and his POA and patient unfortunately do not know if he has prescription medication coverage.  Anemia now on ferrous sulfate 65 mg daily.  Temporary A-fib during hospitalization, was in sinus rhythm upon discharge.  Echocardiogram was done during hospitalization with the following findings: (02/02/2022)    • Left ventricular ejection fraction appears to be 56 - 60%.  • Left ventricular diastolic function was normal.  • There is moderate, bileaflet mitral valve thickening present.  • Mild mitral valve regurgitation is present.  • Mild tricuspid valve regurgitation is present.  • Mild dilation of the ascending aorta is present.    His blood pressure and heart rate have been controlled with metoprolol and diltiazem and he denies any shortness of breath, chest pain.     No urrent phantom pain.    43 minutes were spent in reviewing history, assessment and plan    REVIEW OF SYSTEMS     Constitutional neg except per HPI   Resp neg  CV neg    PHYSICAL EXAMINATION     Physical Exam  Constitutional  No distress  Cardiovascular Rate  normal . Rhythm: regular . Heart sounds:  normal  Pulmonary/Chest  Effort normal. Breath sounds:  normal  Musc BKA left amputation   Psychiatric  Alert. Judgment and thought content normal. Mood normal     REVIEWED DATA     Labs:   Lab Results   Component Value Date    GLUCOSE 96 03/04/2022    BUN 16 03/04/2022    CREATININE 0.81 03/04/2022    EGFR 93.1 03/04/2022    BCR 19.8 03/04/2022    K 3.7 03/04/2022    CO2 25.0 03/04/2022    CALCIUM 8.1 (L) 03/04/2022    PROTENTOTREF 6.5 11/15/2021    ALBUMIN 2.7 (L) 05/22/2022    BILITOT 0.6 03/04/2022    AST 16 03/04/2022    ALT 11 03/04/2022     Lab Results   Component Value Date    WBC 12.49 (H) 05/24/2022    HGB 8.5 (L) 05/24/2022    HCT 27.3 (L) 05/24/2022    MCV 84.8 05/24/2022     05/24/2022     Lab Results   Component Value Date    CHOL 118 02/03/2022    CHLPL 197 11/15/2021    TRIG 148 02/03/2022    HDL 27 (L)  02/03/2022    LDL 65 02/03/2022   No results found for: HGBA1C   Lab Results   Component Value Date    IRON 28 (L) 05/19/2022    TIBC 142 (L) 05/19/2022    FERRITIN 1,536.2 (H) 05/19/2022     Imaging:           Medical Tests:           Summary of old records / correspondence / consultant report:           Request outside records:           **Dragon dictation used for documentation.

## 2023-05-02 NOTE — TELEPHONE ENCOUNTER
Caller: LETA    Relationship: Niece/Nephew    Best call back number: [phone]    What was the call regarding: THE PATIENT HAS AN APPOINTMENT TODAY AND GEOVANY WOULD LIKE FOR DAVID GRAMAJO TO BE AWARE OF A FEW THINGS.     LETA IS CALLING TO LET DAVID GRAMAJO KNOW THAT THE PATIENT HAS BEEN IN THE NURSING HOME FOR A YEAR AND HE GOT OUT 2 WEEKS AGO. PATIENT RECENTLY HAD HIS LEFT LEG AMPUTATED AND HE CAME HOME WITH 2 WEEKS WORTH OF MEDICATIONS. THE PATIENT RAN HIM OUT OF XARELTO MEDICATION AND LETA IS UNABLE TO FIND ANY MEDICATION INSURANCE FOR HIM SO SHE PAID OUT OF POCKET FOR A 3 DAY SUPPLY OF MEDICATION. LETA STATES SHE IS NOT SURE IF THE OFFICE HAS ANY INSURANCE ON FILE FOR MEDICATIONS BUT IF NOT SHE WOULD LIKE TO KNOW IF THE PATIENT IS ABLE TO GET SAMPLES OF THE MEDICATION.    LETA ALSO STATES THE PATIENT HAD A HOME HEALTH REFERRAL TO [facility] PUT IN BY THE NURSING HOME AND SHE CALLED THEM BUT THEY STATE THEY ARE NOT IN NETWORK WITH THE PATIENT'S INSURANCE. LETA WOULD LIKE TO KNOW IF DAVID GRAMAJO IS ABLE TO PUT IN A REFERRAL FOR HOME HEALTH.    LETA STATES THE PATIENT WILL BE HAVING A WHEELCHAIR TRANSPORTATION SERVICE BRING HIM TO HIS APPOINTMENT AND SHE WOULD LIKE FOR SOMEONE TO GIVE THEM A CALL [phone] WHEN HE IS DONE AT HS APPOINTMENT SO THEY CAN COME PICK HIM UP    PLEASE ADVISE

## 2023-05-02 NOTE — TELEPHONE ENCOUNTER
Caller: ADE    Relationship:     Best call back number: [phone]    What was the call regarding: PATIENTS JALEESA STATED SHE NEEDS DAVID GRAMAJO TO COMPLETE MEDICAL FORM FOR [facility] 3 FOR PATIENT.    SHE STATED THIS IS AVAILABLE ONLINE OR IF SHE NEEDS TO SEND IT IN SOMEHOW PLEASE CALL TO ADVISE.

## 2023-05-02 NOTE — TELEPHONE ENCOUNTER
Caller: ADE    Relationship:     Best call back number: [phone]    What was the call regarding: PATIENTS JALEESA STATED THE PHARMACY STATED PATIENTS INSURANCE NEEDS PRIOR AUTHORIZATION FOR ZARELTO.    SHE WOULD LIKE TO HAVE THIS COMPLETED TODAY IF POSSIBLE.

## 2023-05-03 ENCOUNTER — OFFICE VISIT (OUTPATIENT)
Dept: CARDIOLOGY | Facility: CLINIC | Age: 73
End: 2023-05-03
Payer: MEDICARE

## 2023-05-03 ENCOUNTER — TELEPHONE (OUTPATIENT)
Dept: INTERNAL MEDICINE | Age: 73
End: 2023-05-03
Payer: MEDICARE

## 2023-05-03 ENCOUNTER — TELEPHONE (OUTPATIENT)
Dept: CARDIOLOGY | Facility: CLINIC | Age: 73
End: 2023-05-03
Payer: MEDICARE

## 2023-05-03 VITALS
HEIGHT: 68 IN | DIASTOLIC BLOOD PRESSURE: 82 MMHG | BODY MASS INDEX: 18.34 KG/M2 | WEIGHT: 121 LBS | SYSTOLIC BLOOD PRESSURE: 130 MMHG | HEART RATE: 63 BPM

## 2023-05-03 DIAGNOSIS — Z86.718 HISTORY OF DVT OF LOWER EXTREMITY: ICD-10-CM

## 2023-05-03 DIAGNOSIS — I48.0 PAROXYSMAL ATRIAL FIBRILLATION: Primary | ICD-10-CM

## 2023-05-03 DIAGNOSIS — Z95.828 PRESENCE OF IVC FILTER: ICD-10-CM

## 2023-05-03 LAB
ALBUMIN SERPL-MCNC: 4.3 G/DL (ref 3.5–5.2)
ALBUMIN/GLOB SERPL: 2 G/DL
ALP SERPL-CCNC: 92 U/L (ref 39–117)
ALT SERPL-CCNC: 17 U/L (ref 1–41)
APPEARANCE UR: CLEAR
AST SERPL-CCNC: 22 U/L (ref 1–40)
BACTERIA #/AREA URNS HPF: ABNORMAL /HPF
BASOPHILS # BLD AUTO: 0.04 10*3/MM3 (ref 0–0.2)
BASOPHILS NFR BLD AUTO: 0.6 % (ref 0–1.5)
BILIRUB SERPL-MCNC: 0.3 MG/DL (ref 0–1.2)
BILIRUB UR QL STRIP: NEGATIVE
BUN SERPL-MCNC: 20 MG/DL (ref 8–23)
BUN/CREAT SERPL: 20.6 (ref 7–25)
CALCIUM SERPL-MCNC: 9.7 MG/DL (ref 8.6–10.5)
CASTS URNS MICRO: ABNORMAL
CHLORIDE SERPL-SCNC: 104 MMOL/L (ref 98–107)
CHOLEST SERPL-MCNC: 185 MG/DL (ref 0–200)
CHOLEST/HDLC SERPL: 5.14 {RATIO}
CO2 SERPL-SCNC: 29.4 MMOL/L (ref 22–29)
COLOR UR: YELLOW
CREAT SERPL-MCNC: 0.97 MG/DL (ref 0.76–1.27)
EGFRCR SERPLBLD CKD-EPI 2021: 81.9 ML/MIN/1.73
EOSINOPHIL # BLD AUTO: 0.21 10*3/MM3 (ref 0–0.4)
EOSINOPHIL NFR BLD AUTO: 3.4 % (ref 0.3–6.2)
EPI CELLS #/AREA URNS HPF: ABNORMAL /HPF
ERYTHROCYTE [DISTWIDTH] IN BLOOD BY AUTOMATED COUNT: 12.6 % (ref 12.3–15.4)
FERRITIN SERPL-MCNC: 812 NG/ML (ref 30–400)
GLOBULIN SER CALC-MCNC: 2.2 GM/DL
GLUCOSE SERPL-MCNC: 86 MG/DL (ref 65–99)
GLUCOSE UR QL STRIP: NEGATIVE
HCT VFR BLD AUTO: 42.9 % (ref 37.5–51)
HDLC SERPL-MCNC: 36 MG/DL (ref 40–60)
HGB BLD-MCNC: 14.1 G/DL (ref 13–17.7)
HGB UR QL STRIP: ABNORMAL
IMM GRANULOCYTES # BLD AUTO: 0.02 10*3/MM3 (ref 0–0.05)
IMM GRANULOCYTES NFR BLD AUTO: 0.3 % (ref 0–0.5)
IRON SATN MFR SERPL: 23 % (ref 20–50)
IRON SERPL-MCNC: 59 MCG/DL (ref 59–158)
KETONES UR QL STRIP: NEGATIVE
LDLC SERPL CALC-MCNC: 115 MG/DL (ref 0–100)
LEUKOCYTE ESTERASE UR QL STRIP: ABNORMAL
LYMPHOCYTES # BLD AUTO: 0.94 10*3/MM3 (ref 0.7–3.1)
LYMPHOCYTES NFR BLD AUTO: 15 % (ref 19.6–45.3)
MCH RBC QN AUTO: 31.4 PG (ref 26.6–33)
MCHC RBC AUTO-ENTMCNC: 32.9 G/DL (ref 31.5–35.7)
MCV RBC AUTO: 95.5 FL (ref 79–97)
MONOCYTES # BLD AUTO: 0.58 10*3/MM3 (ref 0.1–0.9)
MONOCYTES NFR BLD AUTO: 9.3 % (ref 5–12)
NEUTROPHILS # BLD AUTO: 4.46 10*3/MM3 (ref 1.7–7)
NEUTROPHILS NFR BLD AUTO: 71.4 % (ref 42.7–76)
NITRITE UR QL STRIP: NEGATIVE
NRBC BLD AUTO-RTO: 0 /100 WBC (ref 0–0.2)
PH UR STRIP: 6 [PH] (ref 5–8)
PLATELET # BLD AUTO: 302 10*3/MM3 (ref 140–450)
POTASSIUM SERPL-SCNC: 4.9 MMOL/L (ref 3.5–5.2)
PROT SERPL-MCNC: 6.5 G/DL (ref 6–8.5)
PROT UR QL STRIP: NEGATIVE
PSA SERPL-MCNC: 2.13 NG/ML (ref 0–4)
RBC # BLD AUTO: 4.49 10*6/MM3 (ref 4.14–5.8)
RBC #/AREA URNS HPF: ABNORMAL /HPF
SODIUM SERPL-SCNC: 141 MMOL/L (ref 136–145)
SP GR UR STRIP: 1.02 (ref 1–1.03)
T4 FREE SERPL-MCNC: 1.07 NG/DL (ref 0.93–1.7)
TIBC SERPL-MCNC: 261 MCG/DL
TRIGL SERPL-MCNC: 190 MG/DL (ref 0–150)
TSH SERPL DL<=0.005 MIU/L-ACNC: 1.63 UIU/ML (ref 0.27–4.2)
UIBC SERPL-MCNC: 202 MCG/DL (ref 112–346)
UROBILINOGEN UR STRIP-MCNC: ABNORMAL MG/DL
VLDLC SERPL CALC-MCNC: 34 MG/DL (ref 5–40)
WBC # BLD AUTO: 6.25 10*3/MM3 (ref 3.4–10.8)
WBC #/AREA URNS HPF: ABNORMAL /HPF

## 2023-05-03 NOTE — TELEPHONE ENCOUNTER
Pt niece (Amber) LVM saying his uncle is coming in today for an appt and he's needing some Xarelto samples for him.     Thanks, Monica. MA

## 2023-05-03 NOTE — PROGRESS NOTES
Subjective:     Encounter Date:05/03/2023      Patient ID: Jose Angel Us is a 72 y.o. male.    Chief Complaint:  Establish care    HPI:   72 y.o. male with hypertension, hyperlipidemia, BPH, paroxysmal atrial fibrillation, COVID related left lower extremity DVT and arterial thrombosis complicated by large RP bleed while on heparin drip in Feb 2022 with subsequent discharge to hospice.  Unfortunately patient's clinical course was complicated by left foot gangrene requiring emergent foot amputation and subsequent BKA in May 2022.  He was then discharged to a nursing home and improved with rehab.  He is now home.  He presents today for establishing care.  Of note, during the February admission given his massive RP bleed requiring ICU stay and 6 units PRBCs, it was sided that he was not an ideal anticoagulation candidate.  He underwent IVC filter placement and was discharged home without any anticoagulation.  It appears that over the last couple of months, he was started on Xarelto.  For now, he has tolerated this well.  He currently has no complaints other than the cost of the Xarelto.    The following portions of the patient's history were reviewed and updated as appropriate: allergies, current medications, past family history, past medical history, past social history, past surgical history and problem list.     REVIEW OF SYSTEMS:   All systems reviewed.  Pertinent positives identified in HPI.  All other systems are negative.    Past Medical History:   Diagnosis Date   • Anemia    • BPH (benign prostatic hyperplasia)    • Cancer    • Hyperlipidemia    • Hypertension        Family History   Problem Relation Age of Onset   • Dementia Mother    • Angina Father    • Heart disease Father    • Heart attack Father    • Diabetes Maternal Uncle    • Prostate cancer Neg Hx    • Colon cancer Neg Hx        Social History     Socioeconomic History   • Marital status: Unknown   Tobacco Use   • Smoking status: Never   • Smokeless  tobacco: Never   Vaping Use   • Vaping Use: Never used   Substance and Sexual Activity   • Alcohol use: No     Comment: quit a couple years ago   • Drug use: No   • Sexual activity: Defer       Allergies   Allergen Reactions   • Ciprofloxacin Unknown (See Comments)     Patient is unsure what reaction he had, severe sinus pressure   • Grass    • Pollen Extract    • Sulfa Antibiotics Rash       Past Surgical History:   Procedure Laterality Date   • HERNIA REPAIR  1952   • INSERT CENTRAL LINE AT BEDSIDE  2/7/2022        • VENA CAVA FILTER INSERTION N/A 2/8/2022    Procedure: VENA CAVA FILTER INSERTION;  Surgeon: Ligia Méndez MD;  Location: [facility] 18/19;  Service: Vascular;  Laterality: N/A;         ECG 12 Lead    Date/Time: 5/3/2023 3:26 PM  Performed by: Agustin Loza MD  Authorized by: Agustin Loza MD   Comparison: compared with previous ECG from 5/12/2022  Similar to previous ECG  Rhythm: sinus rhythm  Rate: normal  QRS axis: normal    Clinical impression: non-specific ECG               Objective:         Vitals:    05/03/23 1508   BP: 130/82   Pulse: 63       PHYSICAL EXAM:  GEN: well appearing, in NAD   HEENT: NCAT, EOMI, moist mucus membranes   Respiratory: CTAB, no wheezes, rales or rhonchi  CV: normal rate, regular rhythm, normal S1, S2, no murmurs, rubs, gallops, +2 radial pulses b/l  GI: soft, nontender, nondistended  MSK: no edema  Skin: no rash, warm, dry  Heme/Lymph: no bruising or bleeding  Psych: organized thought, normal behavior and affect  Neuro: Alert and Oriented x 3, grossly normal motor function        Assessment:         (I48.0) Paroxysmal atrial fibrillation - Plan: ECG 12 Lead    (Z95.828) Presence of IVC filter - Plan: Duplex Venous Lower Extremity - Bilateral CAR, Ambulatory Referral to Vascular Surgery    (Z86.718) History of DVT of lower extremity - Plan: Duplex Venous Lower Extremity - Bilateral CAR    72 y.o. male with hypertension, hyperlipidemia, BPH, paroxysmal atrial  fibrillation, COVID related left lower extremity DVT and arterial thrombosis complicated by large RP bleed while on heparin drip in Feb 2022 with subsequent BKA who presents to establish care.       Plan:       #pAF  CHADSVASC 2.  Has-Bled 2. He had a massive RP bleed requiring 6 units PRBC in February 2022 on heparin drip.  At the time, it was decided that he was not an ideal candidate for anticoagulation so an IVC filter was placed.  He has since been started on Xarelto and notes that he has been on it for a couple of months.  He has not had any further documented episodes of A-fib and it would be reasonable to defer anticoagulation given his history.  His other indication for anticoagulation at this time is the presence of an IVC filter and his history of DVT.  Ideally, if his DVTs have resolved, would have IVC filter removed as this could be a nidus for clot and once that occurs, would likely defer anticoagulation.  Until that happens, given presence of IVC filter, I think it is reasonable to continue the Xarelto.  - Samples for Xarelto given for 1 month as well as a 30-day free month voucher, hopefully his filter can be removed in the next 2 months    #IVC filter/history of DVT  See above for plan  - Continue Xarelto for now  - Referral to Dr. Méndez for possible IVC filter removal    Tarik Willis, thank you very much for referring this kind patient to me. Please call me with any questions or concerns. I will see the patient again in the office in 6 months.         Agustin Loza MD  05/03/23  [facility]    Outpatient Encounter Medications as of 5/3/2023   Medication Sig Dispense Refill   • acetaminophen (TYLENOL) 325 MG tablet Take 2 tablets by mouth Every 6 (Six) Hours As Needed for Mild Pain , Moderate Pain , Headache or Fever.     • B Complex-C (B Complex-Vitamin C) capsule Take 1 each by mouth Daily. 90 capsule 3   • cetirizine (zyrTEC) 10 MG tablet Take 1 tablet by mouth Daily. 90 tablet 3    • collagenase 250 UNIT/GM ointment Apply 1 application topically to the appropriate area as directed Daily.     • dilTIAZem (CARDIZEM) 30 MG tablet Take 1 tablet by mouth Every 8 (Eight) Hours. 90 tablet 3   • ferrous sulfate 325 (65 FE) MG tablet Take 1 tablet by mouth Daily With Breakfast. 90 tablet 1   • fluticasone (FLONASE) 50 MCG/ACT nasal spray 1 spray into the nostril(s) as directed by provider Daily. 9.9 mL 1   • HYDROcodone-acetaminophen (NORCO)  MG per tablet Take 1 tablet by mouth.     • metoprolol tartrate (LOPRESSOR) 25 MG tablet Take 0.5 tablets by mouth 2 (Two) Times a Day. 45 tablet 3   • rivaroxaban (XARELTO) 20 MG tablet Take 1 tablet by mouth Daily. 90 tablet 3     No facility-administered encounter medications on file as of 5/3/2023.

## 2023-05-03 NOTE — TELEPHONE ENCOUNTER
Caller: SUZY    Relationship: Home Health    Best call back number:[phone]    What was the call regarding: SARAI IS CALLING TO LET DAVID GRAMAJO KNOW THAT DUE TO STAFFING ISSUES THEY WILL NOT BE ABLE TO ACCEPT THE REFERRAL THAT THEY RECEIVED.    PLEASE ADVISE

## 2023-05-03 NOTE — TELEPHONE ENCOUNTER
Sister advised papers are done. Copies will be uploaded as requested. Sister verbalized understanding. MEGHAN

## 2023-05-03 NOTE — TELEPHONE ENCOUNTER
Sister was advised of PCP dictation and pt/family should reach out to insurance to find out coverage. Sister verbalized understanding. MEGHAN   Neuro

## 2023-05-25 ENCOUNTER — HOSPITAL ENCOUNTER (OUTPATIENT)
Dept: CARDIOLOGY | Facility: HOSPITAL | Age: 73
Discharge: HOME | End: 2023-05-25
Payer: MEDICARE

## 2023-05-30 DIAGNOSIS — I10 PRIMARY HYPERTENSION: ICD-10-CM

## 2023-06-16 ENCOUNTER — TELEPHONE (OUTPATIENT)
Dept: INTERNAL MEDICINE | Age: 73
End: 2023-06-16
Payer: MEDICARE

## 2023-06-16 NOTE — TELEPHONE ENCOUNTER
THEY HAVE A REFERRAL  FOR HOME HEALTH BUT IT ONLY STATES [facility] [employer].  IT WOULD  NEED TO HAVE NURSING AND OR PHYSICAL THERAPY OR BOTH ALONG WITH HOME HEALTH. PLEASE ADVISE.

## 2023-06-17 DIAGNOSIS — Z59.86 FINANCIAL INSECURITY: ICD-10-CM

## 2023-06-17 DIAGNOSIS — Z89.512 S/P BILATERAL BKA (BELOW KNEE AMPUTATION): Primary | ICD-10-CM

## 2023-06-17 DIAGNOSIS — Z89.511 S/P BILATERAL BKA (BELOW KNEE AMPUTATION): Primary | ICD-10-CM

## 2023-06-17 DIAGNOSIS — Z59.82 TRANSPORTATION INSECURITY: ICD-10-CM

## 2023-06-17 SDOH — ECONOMIC STABILITY - INCOME SECURITY: FINANCIAL INSECURITY: Z59.86

## 2023-06-17 SDOH — ECONOMIC STABILITY - TRANSPORTATION SECURITY: TRANSPORTATION INSECURITY: Z59.82

## 2023-08-01 DIAGNOSIS — Z91.09 ENVIRONMENTAL ALLERGIES: ICD-10-CM

## 2023-08-01 RX ORDER — FLUTICASONE PROPIONATE 50 MCG
SPRAY, SUSPENSION (ML) NASAL
Qty: 16 ML | Refills: 1 | Status: SHIPPED | OUTPATIENT
Start: 2023-08-01

## 2023-11-29 DIAGNOSIS — I10 PRIMARY HYPERTENSION: ICD-10-CM

## (undated) DEVICE — NDL PERC 1PRT THNWALL W/BASEPLT 18G 7CM

## (undated) DEVICE — FLTR VC CELECT PLAT UNISET W NAVALIGN DEL 7F: Type: IMPLANTABLE DEVICE | Site: VENA CAVA | Status: NON-FUNCTIONAL

## (undated) DEVICE — SOL NS 500ML

## (undated) DEVICE — Device: Brand: D-STAT® DRY SILVER CLEAR TOPICAL HEMOSTAT

## (undated) DEVICE — GOWN,NON-REINFORCED,SIRUS,SET IN SLV,XXL: Brand: MEDLINE

## (undated) DEVICE — BLD SCLPL SURG PREM SS SZ15C

## (undated) DEVICE — GLV SURG BIOGEL M LTX PF 6 1/2

## (undated) DEVICE — PK ANGIO 40

## (undated) DEVICE — RADIFOCUS GLIDEWIRE: Brand: GLIDEWIRE

## (undated) DEVICE — CVR PROB 96IN LF STRL

## (undated) DEVICE — SYRINGE KIT,PACKAGED,,150FT,MK 7(ANGIO-ARTERION, 150ML SYR KIT W/QFT,MC)(60729385): Brand: MEDRAD® MARK 7 ARTERION DISPOSABLE SYRINGE 150 ML WITH QUICK FILL TUBE